# Patient Record
Sex: FEMALE | Race: WHITE | NOT HISPANIC OR LATINO | Employment: OTHER | URBAN - METROPOLITAN AREA
[De-identification: names, ages, dates, MRNs, and addresses within clinical notes are randomized per-mention and may not be internally consistent; named-entity substitution may affect disease eponyms.]

---

## 2017-01-03 ENCOUNTER — TELEPHONE (OUTPATIENT)
Dept: FAMILY MEDICINE | Facility: CLINIC | Age: 82
End: 2017-01-03

## 2017-01-03 DIAGNOSIS — E78.5 HYPERLIPIDEMIA WITH TARGET LDL LESS THAN 100: Primary | ICD-10-CM

## 2017-01-03 NOTE — TELEPHONE ENCOUNTER
FYI-  [facility] pharmacist called requesting information on tried and failed medications for patient. Looking for ways to helpt lower cost of pt's cholesterol medication. Asking if patient has tried Lipitor. Informed pharmacist no lipitor on file but patient has used simvastatin with leg pain SE. Pharmacist will fax information on covered alternatives for provider review. Fax number confirmed.

## 2017-01-04 RX ORDER — ATORVASTATIN CALCIUM 20 MG/1
20 TABLET, FILM COATED ORAL DAILY
Qty: 90 TABLET | Refills: 3 | Status: SHIPPED | OUTPATIENT
Start: 2017-01-04 | End: 2017-05-08

## 2017-01-04 NOTE — TELEPHONE ENCOUNTER
[facility] was called, spoke to Nenita, the will fill atorvastatin for patient, will be 2$.   Pharmacy will contact the patient.   Attempted to contact patient, ring no answer.

## 2017-01-24 DIAGNOSIS — I48.91 ATRIAL FIBRILLATION (H): Primary | ICD-10-CM

## 2017-03-06 ENCOUNTER — TELEPHONE (OUTPATIENT)
Dept: FAMILY MEDICINE | Facility: CLINIC | Age: 82
End: 2017-03-06

## 2017-03-06 NOTE — TELEPHONE ENCOUNTER
Patient called the clinic reporting 7/10 tailbone pain only with sitting, no pain when walking.   She fell years back causing similar pain, but it went away.   Her  recently passed away in September and she was sitting with him a lot.  She also lost 20lbs, gained 10, and trying to gain more.   No numbness or tingling in legs, no weakness, no other lumps or bumps.     NURSING PLAN: Nursing advice to patient given    RECOMMENDED DISPOSITION:  Home care advice - For Back Pain     Avoid activities such as prolonged sitting, lifting, or jumping until the pain is resolved (no not stay in bed).  Take your usual pain medication for discomfort.  Do not give aspirin to a child. Avoid aspirin-like products if age <20 years old. Avoid acetaminophen if liver disease is present. Avoid ibuprofen if kidney disease or stomach problems exist or in the case of pregnancy. Follow the directions on the label.   If pain is related to an injury, apply ice packs for the first 24 hours, then moist heat.   Use moist heat (shower, tub, or moist hot towels) for 20 to 30 minutes every 2 hours for 48 hours, but only while person is awake.    Sleep in a fetal position or on the back with one to two pillows under the knees to help reduce discomfort.  For intermittent or chronic back discomfort, use a heating pad on the affected area 20 to 30 minutes every 2 to 4 hours. Do not sleep on a heating pad. Do not apply heating pad directly to the skin without a cloth barrier between heating pad and skin.     Report the Following Problems to Your PCP/Clinic/ED    No improvement  Pain worsens.    Seek Emergency Care Immediately If Any of the Following Occur     New onset of persistent numbness or tingling in legs of feet, or loss of bowel or bladder control.  Weakness in the limbs.    Will comply with recommendation: Yes  If further questions/concerns or if symptoms do not improve, worsen or new symptoms develop, call your PCP or [facility] as soon as possible.      Guideline used:  Telephone Triage Protocols for Nurses, Fourth Edition, Geeta Baumann RN

## 2017-03-06 NOTE — TELEPHONE ENCOUNTER
Reason for Call:  Other     Detailed comments: problems with tailbone for 2 weeks.  Has questions, call before 10:30 or after 1:00  Phone Number Patient can be reached at: Home number on file [phone] (home)    Best Time: before 10:30 or after 1:00    Can we leave a detailed message on this number? YES    Call taken on 3/6/2017 at 9:06 AM by DAYSI DUMONT

## 2017-05-08 NOTE — PROGRESS NOTES
SUBJECTIVE:                                                            Aileen Raygoza is a 82 year old female who presents for Preventive Visit.    Are you in the first 12 months of your Medicare Part B coverage?  No    Healthy Habits:    Do you get at least three servings of calcium containing foods daily (dairy, green leafy vegetables, etc.)? yes    Amount of exercise or daily activities, outside of work: 1-2 day(s) per week    Problems taking medications regularly No    Medication side effects: No    Have you had an eye exam in the past two years? yes    Do you see a dentist twice per year? yes    Do you have sleep apnea, excessive snoring or daytime drowsiness?no    COGNITIVE SCREEN  1) Repeat 3 items (Banana, Sunrise, Chair)    2) Clock draw: NORMAL  3) 3 item recall: Recalls 3 objects  Results: 3 items recalled: COGNITIVE IMPAIRMENT LESS LIKELY    Mini-CogTM Copyright [employer] [place]. Licensed by the author for use in [facility] [employer]; reprinted with permission (mahesh@[facility]). All rights reserved.        Tailbone pain      Duration: 09/2016    Description (location/character/radiation): patient states that ever since she lost weight has been having tail bone pain when she sits down    Intensity:  mild    Accompanying signs and symptoms: see above    History (similar episodes/previous evaluation): None    Precipitating or alleviating factors: None    Therapies tried and outcome: None    Not significantly painful, does not need to make medication for it.  Only when sitting on hard surfaces.  Denies injury.  Has not had this problem until she lost about 15-20 pounds.       Reviewed and updated as needed this visit by clinical staff  Tobacco  Allergies  Meds  Problems  Med Hx  Surg Hx  Fam Hx  Soc Hx          Reviewed and updated as needed this visit by Provider  Tobacco  Allergies  Meds  Problems  Med Hx  Surg Hx  Fam Hx  Soc Hx         Social History   Substance Use Topics     Smoking status:  Never Smoker     Smokeless tobacco: Never Used     Alcohol use 0.0 oz/week     0 Standard drinks or equivalent per week      Comment: 1 at [employer]       The patient does not drink >3 drinks per day nor >7 drinks per week.    Today's PHQ-2 Score:   PHQ-2 ( 1999 [employer]) 5/9/2017 5/2/2016   Q1: Little interest or pleasure in doing things 0 0   Q2: Feeling down, depressed or hopeless 0 0   PHQ-2 Score 0 0       Do you feel safe in your environment - Yes    Do you have a Health Care Directive?: Yes: Advance Directive has been received and scanned.    Current providers sharing in care for this patient include:   Patient Care Team:  Kristina Beltran PA-C as PCP - General (Physician Assistant)      Hearing impairment: No    Ability to successfully perform activities of daily living: Yes, no assistance needed     Fall risk:  Fallen 2 or more times in the past year?: No  Any fall with injury in the past year?: No    Home safety:  none identified      The following health maintenance items are reviewed in Epic and correct as of today:  Health Maintenance   Topic Date Due     CREATININE Q1 YEAR (NO INBASKET)  05/02/2017     FALL RISK ASSESSMENT  05/02/2017     INFLUENZA VACCINE (SYSTEM ASSIGNED)  09/01/2017     MEDICARE ANNUAL WELLNESS VISIT  05/09/2018     TETANUS IMMUNIZATION (SYSTEM ASSIGNED)  01/01/2020     ADVANCE DIRECTIVE PLANNING Q5 YRS (NO INBASKET)  04/13/2020     DEXA SCAN SCREENING (SYSTEM ASSIGNED)  Completed     PNEUMOCOCCAL  Completed     Mammogram Screening: Patient over age 75, has elected to continue with mammography screening.     ROS:  C: NEGATIVE for fever, chills, change in weight  I: NEGATIVE for worrisome rashes, moles or lesions  E: NEGATIVE for vision changes or irritation  E/M: NEGATIVE for ear, mouth and throat problems  R: NEGATIVE for significant cough or SOB  B: NEGATIVE for masses, tenderness or discharge  CV: NEGATIVE for chest pain, palpitations or peripheral edema  GI: NEGATIVE  "for nausea, abdominal pain, heartburn, or change in bowel habits  : NEGATIVE for frequency, dysuria, or hematuria  MUSCULOSKELETAL:POSITIVE  for coccyx pain when sitting on hard surfaces  N: NEGATIVE for weakness, dizziness or paresthesias  E: NEGATIVE for temperature intolerance, skin/hair changes  H: NEGATIVE for bleeding problems  P: NEGATIVE for changes in mood or affect    Problem list, Medication list, Allergies, and Medical/Social/Surgical histories reviewed in [facility] and updated as appropriate.  BP Readings from Last 3 Encounters:   05/09/17 120/80   10/05/16 124/70   05/02/16 134/78    Wt Readings from Last 3 Encounters:   05/09/17 148 lb (67.1 kg)   10/05/16 150 lb (68 kg)   05/02/16 165 lb 8 oz (75.1 kg)                  Recent Labs   Lab Test  05/02/16   0850  11/19/15   0854  04/13/15   1001  03/04/14   1433   LDL  94  95  155*  131*   HDL  70  72  77  68   TRIG  110  108  80  75   ALT   --   33  32  54*   CR  0.60  0.80  0.70  0.90   GFRESTIMATED  >90  69  81  61   GFRESTBLACK  >90  84  >90  73   POTASSIUM  4.3  4.0  3.6  3.9   TSH   --    --    --   2.29      OBJECTIVE:                                                            /80 (BP Location: Left arm, Patient Position: Chair, Cuff Size: Adult Regular)  Pulse 80  Temp 98.6  F (37  C) (Tympanic)  Resp 14  Ht 5' 3\" (1.6 m)  Wt 148 lb (67.1 kg)  LMP  (LMP Unknown)  SpO2 100%  BMI 26.22 kg/m2 Estimated body mass index is 26.22 kg/(m^2) as calculated from the following:    Height as of this encounter: 5' 3\" (1.6 m).    Weight as of this encounter: 148 lb (67.1 kg).  EXAM:   GENERAL APPEARANCE: healthy, alert and no distress  EYES: Eyes grossly normal to inspection, PERRL and conjunctivae and sclerae normal  HENT: ear canals and TM's normal, nose and mouth without ulcers or lesions, oropharynx clear and oral mucous membranes moist  NECK: no adenopathy, no asymmetry, masses, or scars and thyroid normal to palpation  RESP: lungs clear to " "auscultation - no rales, rhonchi or wheezes  CV: regular rate and rhythm, normal S1 S2, no S3 or S4, no murmur, click or rub, no peripheral edema and peripheral pulses strong  ABDOMEN: soft, nontender, no hepatosplenomegaly, no masses and bowel sounds normal  MS: no musculoskeletal defects are noted and gait is age appropriate without ataxia, No tenderness to palpation of lumbar spine, sacrum or coccyx.  SKIN: no suspicious lesions or rashes  NEURO: Normal strength and tone, sensory exam grossly normal, mentation intact and speech normal  PSYCH: mentation appears normal and affect normal/bright    ASSESSMENT / PLAN:                                                                ICD-10-CM    1. Medicare annual wellness visit, subsequent Z00.00 Lipid with Reflex to Direct LDL     Glucose   2. Hypertension goal BP (blood pressure) < 140/90 I10 lisinopril (PRINIVIL/ZESTRIL) 20 MG tablet   3. Hyperlipidemia with target LDL less than 100 E78.5 atorvastatin (LIPITOR) 20 MG tablet   4. Encounter for therapeutic drug monitoring Z51.81 Creatinine     Potassium   5. Pain in the coccyx M53.3    Discussed pain in coccyx.  No sign of deformity or injury.  Offered xray today but pt declined.  Will monitor and evaluate further if symptoms worsen.      Has mammogram scheduled today, plans to get colonoscopy in 3 years.        End of Life Planning:  Patient currently has an advanced directive: No.  I have verified the patient's ablity to prepare an advanced directive/make health care decisions.  Literature was provided to assist patient in preparing an advanced directive.    COUNSELING:  Reviewed preventive health counseling, as reflected in patient instructions      Estimated body mass index is 26.22 kg/(m^2) as calculated from the following:    Height as of this encounter: 5' 3\" (1.6 m).    Weight as of this encounter: 148 lb (67.1 kg).     reports that she has never smoked. She has never used smokeless tobacco.      Appropriate " preventive services were discussed with this patient, including applicable screening as appropriate for cardiovascular disease, diabetes, osteopenia/osteoporosis, and glaucoma.  As appropriate for age/gender, discussed screening for colorectal cancer, prostate cancer, breast cancer, and cervical cancer. Checklist reviewing preventive services available has been given to the patient.    Reviewed patients plan of care and provided an AVS. The Basic Care Plan (routine screening as documented in Health Maintenance) for Aileen meets the Care Plan requirement. This Care Plan has been established and reviewed with the Patient.    Counseling Resources:  ATP IV Guidelines  Pooled Cohorts Equation Calculator  Breast Cancer Risk Calculator  FRAX Risk Assessment  ICSI Preventive Guidelines  Dietary Guidelines for Americans, 2010  USDA's MyPlate  ASA Prophylaxis  Lung CA Screening    Kristina Beltran PA-C  [facility]

## 2017-05-08 NOTE — PATIENT INSTRUCTIONS
Preventive Health Recommendations    Female Ages 65 +    Yearly exam:     See your health care provider every year in order to  o Review health changes.   o Discuss preventive care.    o Review your medicines if your doctor has prescribed any.      You no longer need a yearly Pap test unless you've had an abnormal Pap test in the past 10 years. If you have vaginal symptoms, such as bleeding or discharge, be sure to talk with your provider about a Pap test.      Every 1 to 2 years, have a mammogram.  If you are over 69, talk with your health care provider about whether or not you want to continue having screening mammograms.      Every 10 years, have a colonoscopy. Or, have a yearly FIT test (stool test). These exams will check for colon cancer.       Have a cholesterol test every 5 years, or more often if your doctor advises it.       Have a diabetes test (fasting glucose) every three years. If you are at risk for diabetes, you should have this test more often.       At age 65, have a bone density scan (DEXA) to check for osteoporosis (brittle bone disease).    Shots:    Get a flu shot each year.    Get a tetanus shot every 10 years.    Talk to your doctor about your pneumonia vaccines. There are now two you should receive - Pneumovax (PPSV 23) and Prevnar (PCV 13).    Talk to your doctor about the shingles vaccine.    Talk to your doctor about the hepatitis B vaccine.    Nutrition:     Eat at least 5 servings of fruits and vegetables each day.      Eat whole-grain bread, whole-wheat pasta and brown rice instead of white grains and rice.      Talk to your provider about Calcium and Vitamin D.     Lifestyle    Exercise at least 150 minutes a week (30 minutes a day, 5 days a week). This will help you control your weight and prevent disease.      Limit alcohol to one drink per day.      No smoking.       Wear sunscreen to prevent skin cancer.       See your dentist twice a year for an exam and cleaning.    See your  eye doctor every 1 to 2 years to screen for conditions such as glaucoma, macular degeneration and cataracts.  Coccyx or Sacrum Contusion    You have a contusion (bruise) of the coccyx or sacrum. The sacrum is the triangular bone at the base of the spine that joins the pelvic bones. The coccyx (tailbone) is the last bone of the sacrum that hangs down in a point like a small tail. Symptoms include swelling and some bleeding under the skin. This injury generaly takes a few weeks to heal. During that time, the bruise will typically change in color from reddish, to purple-blue, to greenish-yellow, then to yellow-brown. A crack (fracture) in the coccyx bone causes the same symptoms as a contusion in this area. Often, x-rays are not taken since the treatment is the same. If you have fracture of the tailbone as well as a contusion, healing generally takes about four weeks or longer.  Home care  Try to find a position of comfort. Try lying on your side with your knees bent up towards your chest and a pillow between your knees.  A bruised tailbone causes pain when sitting. You may try using a donut pillow. This is a foam pillow with a hole in the center to prevent pressure on the tailbone. You can buy this at a pharmacy or orthopedic supply store.  Ice the injured area to help reduce pain and swelling. Wrap a cold source (ice pack or ice cubes in a plastic bag) in a thin towel. Apply to the bruised area for 20 minutes every 1 to 2 hours the first day. Continue this 3 to 4 times a day until the pain and swelling goes away.  Unless another medication was prescribed, you can take acetaminophen, ibuprofen, or naproxen to control pain. (If you have chronic liver or kidney disease or ever had a stomach ulcer or GI bleeding, talk with your doctor before using these medicines.)  Follow up  Follow up with your health care provider or our staff as advised. Call if you are not improving within 2 weeks.  When to seek medical  advice   Call your health care provider right away if you have any of the following:  Increased pain or swelling  Pain that becomes worse or spreads to one or both legs  Weakness or numbness in one or both legs  Loss of bowel or bladder control  Numbness in the groin area  Signs of infection, including warmth, drainage, or increased redness  Frequent bruising for unknown reasons    8053-1938 [employer]. [address] [place] [address]. All rights reserved. This information is not intended as a substitute for professional medical care. Always follow your healthcare professional's instructions.

## 2017-05-09 ENCOUNTER — TRANSFERRED RECORDS (OUTPATIENT)
Dept: HEALTH INFORMATION MANAGEMENT | Facility: CLINIC | Age: 82
End: 2017-05-09

## 2017-05-09 ENCOUNTER — OFFICE VISIT (OUTPATIENT)
Dept: FAMILY MEDICINE | Facility: CLINIC | Age: 82
End: 2017-05-09
Payer: MEDICARE

## 2017-05-09 VITALS
HEART RATE: 80 BPM | OXYGEN SATURATION: 100 % | WEIGHT: 148 LBS | SYSTOLIC BLOOD PRESSURE: 120 MMHG | BODY MASS INDEX: 26.22 KG/M2 | RESPIRATION RATE: 14 BRPM | TEMPERATURE: 98.6 F | HEIGHT: 63 IN | DIASTOLIC BLOOD PRESSURE: 80 MMHG

## 2017-05-09 DIAGNOSIS — I10 HYPERTENSION GOAL BP (BLOOD PRESSURE) < 140/90: ICD-10-CM

## 2017-05-09 DIAGNOSIS — E78.5 HYPERLIPIDEMIA WITH TARGET LDL LESS THAN 100: ICD-10-CM

## 2017-05-09 DIAGNOSIS — Z00.00 MEDICARE ANNUAL WELLNESS VISIT, SUBSEQUENT: Primary | ICD-10-CM

## 2017-05-09 DIAGNOSIS — Z51.81 ENCOUNTER FOR THERAPEUTIC DRUG MONITORING: ICD-10-CM

## 2017-05-09 DIAGNOSIS — M53.3 PAIN IN THE COCCYX: ICD-10-CM

## 2017-05-09 LAB
CHOLEST SERPL-MCNC: 151 MG/DL
CREAT SERPL-MCNC: 0.55 MG/DL (ref 0.52–1.04)
GFR SERPL CREATININE-BSD FRML MDRD: NORMAL ML/MIN/1.7M2
GLUCOSE SERPL-MCNC: 96 MG/DL (ref 70–99)
HDLC SERPL-MCNC: 77 MG/DL
LDLC SERPL CALC-MCNC: 54 MG/DL
NONHDLC SERPL-MCNC: 74 MG/DL
POTASSIUM SERPL-SCNC: 3.8 MMOL/L (ref 3.4–5.3)
TRIGL SERPL-MCNC: 98 MG/DL

## 2017-05-09 PROCEDURE — 82565 ASSAY OF CREATININE: CPT | Performed by: PHYSICIAN ASSISTANT

## 2017-05-09 PROCEDURE — 36415 COLL VENOUS BLD VENIPUNCTURE: CPT | Performed by: PHYSICIAN ASSISTANT

## 2017-05-09 PROCEDURE — 80061 LIPID PANEL: CPT | Performed by: PHYSICIAN ASSISTANT

## 2017-05-09 PROCEDURE — 82947 ASSAY GLUCOSE BLOOD QUANT: CPT | Performed by: PHYSICIAN ASSISTANT

## 2017-05-09 PROCEDURE — 84132 ASSAY OF SERUM POTASSIUM: CPT | Performed by: PHYSICIAN ASSISTANT

## 2017-05-09 PROCEDURE — G0439 PPPS, SUBSEQ VISIT: HCPCS | Performed by: PHYSICIAN ASSISTANT

## 2017-05-09 RX ORDER — ATORVASTATIN CALCIUM 20 MG/1
20 TABLET, FILM COATED ORAL DAILY
Qty: 90 TABLET | Refills: 3 | Status: SHIPPED | OUTPATIENT
Start: 2017-05-09 | End: 2018-01-15

## 2017-05-09 RX ORDER — LISINOPRIL 20 MG/1
20 TABLET ORAL DAILY
Qty: 90 TABLET | Refills: 3 | Status: SHIPPED | OUTPATIENT
Start: 2017-05-09 | End: 2018-01-15

## 2017-05-09 NOTE — LETTER
[facility]  [address]  [place] [address]  [phone]                                                                                                           Aileen Raygoza  [address]  [place] [address]    May 9, 2017      Dear Aileen,    The results of your recent tests were reviewed and are enclosed.     - Your Cholesterol is normal.  - Your kidney function (Cr, GFR) and potassium are normal.  - Your glucose (screening for diabetes) was normal.    Results for orders placed or performed in visit on 05/09/17   Lipid with Reflex to Direct LDL   Result Value Ref Range    Cholesterol 151 <200 mg/dL    Triglycerides 98 <150 mg/dL    HDL Cholesterol 77 >49 mg/dL    LDL Cholesterol Calculated 54 <100 mg/dL    Non HDL Cholesterol 74 <130 mg/dL   Glucose   Result Value Ref Range    Glucose 96 70 - 99 mg/dL   Creatinine   Result Value Ref Range    Creatinine 0.55 0.52 - 1.04 mg/dL    GFR Estimate >90  Non  GFR Calc >60 mL/min/1.7m2   Potassium   Result Value Ref Range    Potassium 3.8 3.4 - 5.3 mmol/L     Thank you for choosing [facility].  We appreciate the opportunity to serve you and look forward to supporting your healthcare needs in the future.    If you have any questions or concerns, please call me or my staff at [phone].    Sincerely,    Kristina Beltran PA-C

## 2017-05-09 NOTE — MR AVS SNAPSHOT
After Visit Summary   5/9/2017    Aileen Raygoza    MRN: [id number]           Patient Information     Date Of Birth          1935        Visit Information        Provider Department      5/9/2017 9:10 AM Kristina Beltran PA-C [facility]        Today's Diagnoses     Medicare annual wellness visit, subsequent    -  1    Hypertension goal BP (blood pressure) < 140/90        Hyperlipidemia with target LDL less than 100        Encounter for therapeutic drug monitoring        Pain in the coccyx          Care Instructions      Preventive Health Recommendations    Female Ages 65 +    Yearly exam:     See your health care provider every year in order to  o Review health changes.   o Discuss preventive care.    o Review your medicines if your doctor has prescribed any.      You no longer need a yearly Pap test unless you've had an abnormal Pap test in the past 10 years. If you have vaginal symptoms, such as bleeding or discharge, be sure to talk with your provider about a Pap test.      Every 1 to 2 years, have a mammogram.  If you are over 69, talk with your health care provider about whether or not you want to continue having screening mammograms.      Every 10 years, have a colonoscopy. Or, have a yearly FIT test (stool test). These exams will check for colon cancer.       Have a cholesterol test every 5 years, or more often if your doctor advises it.       Have a diabetes test (fasting glucose) every three years. If you are at risk for diabetes, you should have this test more often.       At age 65, have a bone density scan (DEXA) to check for osteoporosis (brittle bone disease).    Shots:    Get a flu shot each year.    Get a tetanus shot every 10 years.    Talk to your doctor about your pneumonia vaccines. There are now two you should receive - Pneumovax (PPSV 23) and Prevnar (PCV 13).    Talk to your doctor about the shingles vaccine.    Talk to your  doctor about the hepatitis B vaccine.    Nutrition:     Eat at least 5 servings of fruits and vegetables each day.      Eat whole-grain bread, whole-wheat pasta and brown rice instead of white grains and rice.      Talk to your provider about Calcium and Vitamin D.     Lifestyle    Exercise at least 150 minutes a week (30 minutes a day, 5 days a week). This will help you control your weight and prevent disease.      Limit alcohol to one drink per day.      No smoking.       Wear sunscreen to prevent skin cancer.       See your dentist twice a year for an exam and cleaning.    See your eye doctor every 1 to 2 years to screen for conditions such as glaucoma, macular degeneration and cataracts.  Coccyx or Sacrum Contusion    You have a contusion (bruise) of the coccyx or sacrum. The sacrum is the triangular bone at the base of the spine that joins the pelvic bones. The coccyx (tailbone) is the last bone of the sacrum that hangs down in a point like a small tail. Symptoms include swelling and some bleeding under the skin. This injury generaly takes a few weeks to heal. During that time, the bruise will typically change in color from reddish, to purple-blue, to greenish-yellow, then to yellow-brown. A crack (fracture) in the coccyx bone causes the same symptoms as a contusion in this area. Often, x-rays are not taken since the treatment is the same. If you have fracture of the tailbone as well as a contusion, healing generally takes about four weeks or longer.  Home care  Try to find a position of comfort. Try lying on your side with your knees bent up towards your chest and a pillow between your knees.  A bruised tailbone causes pain when sitting. You may try using a donut pillow. This is a foam pillow with a hole in the center to prevent pressure on the tailbone. You can buy this at a pharmacy or orthopedic supply store.  Ice the injured area to help reduce pain and swelling. Wrap a cold source (ice pack or ice cubes  in a plastic bag) in a thin towel. Apply to the bruised area for 20 minutes every 1 to 2 hours the first day. Continue this 3 to 4 times a day until the pain and swelling goes away.  Unless another medication was prescribed, you can take acetaminophen, ibuprofen, or naproxen to control pain. (If you have chronic liver or kidney disease or ever had a stomach ulcer or GI bleeding, talk with your doctor before using these medicines.)  Follow up  Follow up with your health care provider or our staff as advised. Call if you are not improving within 2 weeks.  When to seek medical advice   Call your health care provider right away if you have any of the following:  Increased pain or swelling  Pain that becomes worse or spreads to one or both legs  Weakness or numbness in one or both legs  Loss of bowel or bladder control  Numbness in the groin area  Signs of infection, including warmth, drainage, or increased redness  Frequent bruising for unknown reasons    7485-8925 [employer]. [address] [place] [address]. All rights reserved. This information is not intended as a substitute for professional medical care. Always follow your healthcare professional's instructions.                Follow-ups after your visit        Who to contact     If you have questions or need follow up information about today's clinic visit or your schedule please contact [facility] directly at [phone].  Normal or non-critical lab and imaging results will be communicated to you by MyChart, letter or phone within 4 business days after the clinic has received the results. If you do not hear from us within 7 days, please contact the clinic through MyChart or phone. If you have a critical or abnormal lab result, we will notify you by phone as soon as possible.  Submit refill requests through [employer] or call your pharmacy and they will forward the refill request to us. Please allow 3 business  "days for your refill to be completed.          Additional Information About Your Visit        Bluetectorhart Information     [employer] lets you send messages to your doctor, view your test results, renew your prescriptions, schedule appointments and more. To sign up, go to [email or web address] . Click on \"Log in\" on the left side of the screen, which will take you to the Welcome page. Then click on \"Sign up Now\" on the right side of the page.     You will be asked to enter the access code listed below, as well as some personal information. Please follow the directions to create your username and password.     Your access code is: [email or web address]  Expires: 2017  9:20 AM     Your access code will  in 90 days. If you need help or a new code, please call your [place] clinic or [phone].        Care EveryWhere ID     This is your Care EveryWhere ID. This could be used by other organizations to access your [place] medical records  [id number]        Your Vitals Were     Pulse Temperature Respirations Height Last Period Pulse Oximetry    80 98.6  F (37  C) (Tympanic) 14 5' 3\" (1.6 m) (LMP Unknown) 100%    BMI (Body Mass Index)                   26.22 kg/m2            Blood Pressure from Last 3 Encounters:   17 120/80   10/05/16 124/70   16 134/78    Weight from Last 3 Encounters:   17 148 lb (67.1 kg)   10/05/16 150 lb (68 kg)   16 165 lb 8 oz (75.1 kg)              We Performed the Following     Creatinine     Glucose     Lipid with Reflex to Direct LDL     Potassium          Where to get your medicines      These medications were sent to [facility] [address] [place] [address] - [address] [facility]  [address] [facility], [facility] [address]     Phone:  [phone]     atorvastatin 20 MG tablet    lisinopril 20 MG tablet          Primary Care Provider Fax #    [place] [facility] [address] [phone]       [address]  [facility] [address]        Thank you!     " Thank you for choosing [facility]  for your care. Our goal is always to provide you with excellent care. Hearing back from our patients is one way we can continue to improve our services. Please take a few minutes to complete the written survey that you may receive in the mail after your visit with us. Thank you!             Your Updated Medication List - Protect others around you: Learn how to safely use, store and throw away your medicines at www.disposemymeds.org.          This list is accurate as of: 5/9/17  9:20 AM.  Always use your most recent med list.                   Brand Name Dispense Instructions for use    atorvastatin 20 MG tablet    LIPITOR    90 tablet    Take 1 tablet (20 mg) by mouth daily       CALCIUM + D PO      Take 1 tablet by mouth daily       IBUPROFEN PO          lisinopril 20 MG tablet    PRINIVIL/ZESTRIL    90 tablet    Take 1 tablet (20 mg) by mouth daily       multivitamin Tabs tablet      Take 1 tablet by mouth 2 times daily       rivaroxaban ANTICOAGULANT 20 MG Tabs tablet    XARELTO    90 tablet    Take 1 tablet (20 mg) by mouth daily (with dinner)       VITAMIN D PO

## 2017-05-09 NOTE — NURSING NOTE
"Chief Complaint   Patient presents with     Physical       Initial /80 (BP Location: Left arm, Patient Position: Chair, Cuff Size: Adult Regular)  Pulse 80  Temp 98.6  F (37  C) (Tympanic)  Resp 14  Ht 5' 3\" (1.6 m)  Wt 148 lb (67.1 kg)  LMP  (LMP Unknown)  SpO2 100%  BMI 26.22 kg/m2 Estimated body mass index is 26.22 kg/(m^2) as calculated from the following:    Height as of this encounter: 5' 3\" (1.6 m).    Weight as of this encounter: 148 lb (67.1 kg).  Medication Reconciliation: complete    "

## 2017-10-18 ENCOUNTER — TELEPHONE (OUTPATIENT)
Dept: CARDIOLOGY | Facility: CLINIC | Age: 82
End: 2017-10-18

## 2017-10-18 ENCOUNTER — OFFICE VISIT (OUTPATIENT)
Dept: CARDIOLOGY | Facility: CLINIC | Age: 82
End: 2017-10-18
Payer: MEDICARE

## 2017-10-18 VITALS
BODY MASS INDEX: 26.22 KG/M2 | SYSTOLIC BLOOD PRESSURE: 120 MMHG | HEIGHT: 63 IN | HEART RATE: 84 BPM | WEIGHT: 148 LBS | DIASTOLIC BLOOD PRESSURE: 60 MMHG

## 2017-10-18 DIAGNOSIS — I48.0 PAROXYSMAL ATRIAL FIBRILLATION (H): Primary | ICD-10-CM

## 2017-10-18 PROCEDURE — 99214 OFFICE O/P EST MOD 30 MIN: CPT | Performed by: INTERNAL MEDICINE

## 2017-10-18 PROCEDURE — 93000 ELECTROCARDIOGRAM COMPLETE: CPT | Performed by: INTERNAL MEDICINE

## 2017-10-18 NOTE — PROGRESS NOTES
"[id number]  Electrophysiology/ Clinic Note         H&P and Plan:         Anastacio Devlin MD    Physical Exam:  Vitals: /60  Pulse 84  Ht 1.6 m (5' 3\")  Wt 67.1 kg (148 lb)  LMP  (LMP Unknown)  BMI 26.22 kg/m2    Constitutional:  AAO x3.  Pt is in NAD.  HEAD: normocephalic.  SKIN: Skin normal color, texture and turgor with no lesions or eruptions.  Eyes: PERRL, EOMI.  ENT:  Supple, normal JVP. No lymphadenopathy or thyroid enlargement.  Chest:  CTAB.  Cardiac:   RRR, normal  S1 and S2.  No murmurs rubs or gallop.    Abdomen:  Normal BS.  Soft, non-tender and non-distended.  No rebound or guarding.    Extremities:  Pedious pulses palpable B/L.  No LE edema noticed.   Neurological: Strength and sensation grossly symmetric and intact throughout.       CURRENT MEDICATIONS:  Current Outpatient Prescriptions   Medication Sig Dispense Refill     lisinopril (PRINIVIL/ZESTRIL) 20 MG tablet Take 1 tablet (20 mg) by mouth daily 90 tablet 3     atorvastatin (LIPITOR) 20 MG tablet Take 1 tablet (20 mg) by mouth daily 90 tablet 3     rivaroxaban ANTICOAGULANT (XARELTO) 20 MG TABS tablet Take 1 tablet (20 mg) by mouth daily (with dinner) 90 tablet 3     IBUPROFEN PO        Cholecalciferol (VITAMIN D PO)        Calcium Carbonate-Vitamin D (CALCIUM + D PO) Take 1 tablet by mouth daily       multivitamin (OCUVITE) TABS Take 1 tablet by mouth 2 times daily         ALLERGIES     Allergies   Allergen Reactions     Simvastatin Muscle Pain (Myalgia)       PAST MEDICAL HISTORY:  Past Medical History:   Diagnosis Date     Hyperlipidemia LDL goal < 100     on Choles Off original     Hypertension goal BP (blood pressure) < 140/90      Other premature beats      Paroxysmal atrial fibrillation (H) 3/4/14     Paroxysmal supraventricular tachycardia (H) 3/4/14     Syncope      Tinnitus of left ear        PAST SURGICAL HISTORY:  Past Surgical History:   Procedure Laterality Date     D & C  1958    miscarriage     TONSILLECTOMY & ADENOIDECTOMY "  1948       FAMILY HISTORY:  Family History   Problem Relation Age of Onset     Cancer - colorectal Mother      HEART DISEASE Father      Alzheimer Disease Brother      Neurologic Disorder Brother      HEART DISEASE Sister      Neurologic Disorder Sister        SOCIAL HISTORY:  Social History     Social History     Marital status:      Spouse name: N/A     Number of children: N/A     Years of education: N/A     Social History Main Topics     Smoking status: Never Smoker     Smokeless tobacco: Never Used     Alcohol use 0.0 oz/week     0 Standard drinks or equivalent per week      Comment: 1 at [place]     Drug use: No     Sexual activity: Yes     Partners: Male     Other Topics Concern     None     Social History Narrative       Review of Systems:  Skin:  Negative     Eyes:  Positive for glasses  ENT:  Negative    Respiratory:  Negative    Cardiovascular:  Negative    Gastroenterology: Negative    Genitourinary:  Negative    Musculoskeletal:  Positive for arthritis  Neurologic:  Negative    Psychiatric:  Positive for excessive stress  Heme/Lymph/Imm:  Negative    Endocrine:  Negative        Recent Lab Results:  LIPID RESULTS:  Lab Results   Component Value Date    CHOL 151 05/09/2017    HDL 77 05/09/2017    LDL 54 05/09/2017    TRIG 98 05/09/2017    CHOLHDLRATIO 3.2 04/13/2015       LIVER ENZYME RESULTS:  Lab Results   Component Value Date    AST 56 (H) 03/04/2014    ALT 33 11/19/2015       CBC RESULTS:  Lab Results   Component Value Date    WBC 5.6 03/04/2014    RBC 4.75 03/04/2014    HGB 14.7 03/04/2014    HCT 44.1 03/04/2014    MCV 93 03/04/2014    MCH 30.9 03/04/2014    MCHC 33.3 03/04/2014    RDW 12.9 03/04/2014     03/04/2014       BMP RESULTS:  Lab Results   Component Value Date     11/19/2015    POTASSIUM 3.8 05/09/2017    CHLORIDE 106 11/19/2015    CO2 28 11/19/2015    ANIONGAP 6.8 11/19/2015    GLC 96 05/09/2017    BUN 15 11/19/2015    CR 0.55 05/09/2017    GFRESTIMATED >90  Non   GFR Calc   05/09/2017    GFRESTBLACK >90   GFR Calc   05/09/2017    NITESH 8.8 11/19/2015        A1C RESULTS:  No results found for: A1C    INR RESULTS:  No results found for: INR      ECHOCARDIOGRAM  No results found for this or any previous visit (from the past 8760 hour(s)).      Orders Placed This Encounter   Procedures     EKG 12-lead complete w/read - Clinics (performed today)     No orders of the defined types were placed in this encounter.    There are no discontinued medications.      Encounter Diagnosis   Name Primary?     Paroxysmal atrial fibrillation (H) Yes         CC  Kristina Beltran PA-C  [address]  [place] [address]

## 2017-10-18 NOTE — MR AVS SNAPSHOT
"              After Visit Summary   10/18/2017    Aileen Raygoza    MRN: [id number]           Patient Information     Date Of Birth          1935        Visit Information        Provider Department      10/18/2017 11:15 AM Anastacio Devlin MD [facility] [place]        Today's Diagnoses     Paroxysmal atrial fibrillation (H)    -  1       Follow-ups after your visit        Who to contact     If you have questions or need follow up information about today's clinic visit or your schedule please contact [facility] directly at [phone].  Normal or non-critical lab and imaging results will be communicated to you by Touch[employer], letter or phone within 4 business days after the clinic has received the results. If you do not hear from us within 7 days, please contact the clinic through [employer] or phone. If you have a critical or abnormal lab result, we will notify you by phone as soon as possible.  Submit refill requests through [employer] or call your pharmacy and they will forward the refill request to us. Please allow 3 business days for your refill to be completed.          Additional Information About Your Visit        MyChart Information     [employer] lets you send messages to your doctor, view your test results, renew your prescriptions, schedule appointments and more. To sign up, go to [email or web address] . Click on \"Log in\" on the left side of the screen, which will take you to the Welcome page. Then click on \"Sign up Now\" on the right side of the page.     You will be asked to enter the access code listed below, as well as some personal information. Please follow the directions to create your username and password.     Your access code is: [email or web address]  Expires: 2018 11:22 AM     Your access code will  in 90 days. If you need help or a new code, please call your [place] clinic or [phone].        Care EveryWhere " "ID     This is your Care EveryWhere ID. This could be used by other organizations to access your [facility] medical records  [id number]        Your Vitals Were     Pulse Height Last Period BMI (Body Mass Index)          84 1.6 m (5' 3\") (LMP Unknown) 26.22 kg/m2         Blood Pressure from Last 3 Encounters:   10/18/17 120/60   05/09/17 120/80   10/05/16 124/70    Weight from Last 3 Encounters:   10/18/17 67.1 kg (148 lb)   05/09/17 67.1 kg (148 lb)   10/05/16 68 kg (150 lb)              We Performed the Following     EKG 12-lead complete w/read - Clinics (performed today)        Primary Care Provider Office Phone # Fax #    Kristina Beltran PA-C [phone] [address] [facility] [address]  [facility] [address]        Equal Access to Services     HELLEN BLOOD : Hadii aad ku hadasho Soomaali, waaxda luqadaha, qaybta kaalmada adeegyada, eric schwab . So [facility] [phone].    ATENCIÓN: Si habla español, tiene a camacho disposición servicios gratuitos de asistencia lingüística. Llame al [phone].    We comply with applicable federal civil rights laws and [place] laws. We do not discriminate on the basis of race, color, national origin, age, disability, sex, sexual orientation, or gender identity.            Thank you!     Thank you for choosing [facility]  for your care. Our goal is always to provide you with excellent care. Hearing back from our patients is one way we can continue to improve our services. Please take a few minutes to complete the written survey that you may receive in the mail after your visit with us. Thank you!             Your Updated Medication List - Protect others around you: Learn how to safely use, store and throw away your medicines at www.disposemymeds.org.          This list is accurate as of: 10/18/17 11:57 AM.  Always use your most recent med list.                   Brand Name Dispense " Instructions for use Diagnosis    atorvastatin 20 MG tablet    LIPITOR    90 tablet    Take 1 tablet (20 mg) by mouth daily    Hyperlipidemia with target LDL less than 100       CALCIUM + D PO      Take 1 tablet by mouth daily        IBUPROFEN PO           lisinopril 20 MG tablet    PRINIVIL/ZESTRIL    90 tablet    Take 1 tablet (20 mg) by mouth daily    Hypertension goal BP (blood pressure) < 140/90       multivitamin Tabs tablet      Take 1 tablet by mouth 2 times daily        rivaroxaban ANTICOAGULANT 20 MG Tabs tablet    XARELTO    90 tablet    Take 1 tablet (20 mg) by mouth daily (with dinner)    Atrial fibrillation (H)       VITAMIN D PO

## 2017-10-18 NOTE — PROGRESS NOTES
REASON FOR VISIT:  Evaluation of paroxysmal AFib.        HISTORY OF PRESENT ILLNESS:  Ms. Abraham is a delightful, 82-year-old lady with a history of hypertension and paroxysmal AFib who is here for evaluation.      The patient was initially diagnosed with AFib in 2014.  At that time she was admitted in the hospital with diarrhea and was found to have asymptomatic paroxysmal AFib.  She was started on Xarelto for thromboembolic prevention.  No AV tirso agents were started, as she was asymptomatic and mildly bradycardic.      At the moment, she is doing well.  She denies any symptoms during this visit.  She denies chest pain, shortness of breath, lightheadedness, near-syncope or syncopal episode.      EKG showed normal sinus rhythm with left axis deviation.  Echocardiogram obtained in 2014 revealed an EF of 60%-65%.  She also had labs in May of this year showing a normal creatinine and good cholesterol levels.      ASSESSMENT AND PLAN:   1.  Paroxysmal AFib.  She continues to be asymptomatic.  Continue conservative approach.   2.  Embolic prevention.  CHADS-VASc score of 4, on Xarelto.  Continue anticoagulation indefinitely.   3.  Followup care.  Follow up in clinic in a year or as needed.         KYLE LINARES MD             D: 10/18/2017 11:21   T: 10/18/2017 12:41   MT: maría      Name:     BRANDIN ABRAHAM   MRN:      [id number]        Account:      [id number]   :      1935           Service Date: 10/18/2017      Document: [id number]

## 2017-10-18 NOTE — TELEPHONE ENCOUNTER
Pt left voicemail asking if she can take a Tylenol PM each night to help her sleep. Called pt back, told her that was just fine, there are no contraindications for her to take Tylenol PM. Pt thanked writer for the information. No further questions.

## 2017-10-18 NOTE — LETTER
10/18/2017    Kristina Beltran PA-C  [address]  [place] [address]    RE: Aileen Mccormickarlette       Dear Colleague,    I had the pleasure of seeing Aileen Raygoza in the [facility].    REASON FOR VISIT:  Evaluation of paroxysmal AFib.        HISTORY OF PRESENT ILLNESS:  Ms. Raygoza is a delightful, 82-year-old lady with a history of hypertension and paroxysmal AFib who is here for evaluation.      The patient was initially diagnosed with AFib in 03/2014.  At that time she was admitted in the hospital with diarrhea and was found to have asymptomatic paroxysmal AFib.  She was started on Xarelto for thromboembolic prevention.  No AV tirso agents were started, as she was asymptomatic and mildly bradycardic.      At the moment, she is doing well.  She denies any symptoms during this visit.  She denies chest pain, shortness of breath, lightheadedness, near-syncope or syncopal episode.      EKG showed normal sinus rhythm with left axis deviation.  Echocardiogram obtained in 03/2014 revealed an EF of 60%-65%.  She also had labs in May of this year showing a normal creatinine and good cholesterol levels.     Outpatient Encounter Prescriptions as of 10/18/2017   Medication Sig Dispense Refill     lisinopril (PRINIVIL/ZESTRIL) 20 MG tablet Take 1 tablet (20 mg) by mouth daily 90 tablet 3     atorvastatin (LIPITOR) 20 MG tablet Take 1 tablet (20 mg) by mouth daily 90 tablet 3     rivaroxaban ANTICOAGULANT (XARELTO) 20 MG TABS tablet Take 1 tablet (20 mg) by mouth daily (with dinner) 90 tablet 3     IBUPROFEN PO        Cholecalciferol (VITAMIN D PO)        Calcium Carbonate-Vitamin D (CALCIUM + D PO) Take 1 tablet by mouth daily       multivitamin (OCUVITE) TABS Take 1 tablet by mouth 2 times daily       No facility-administered encounter medications on file as of 10/18/2017.       ASSESSMENT AND PLAN:   1.  Paroxysmal AFib.  She continues to be asymptomatic.  Continue  conservative approach.   2.  Embolic prevention.  CHADS-VASc score of 4, on Xarelto.  Continue anticoagulation indefinitely.   3.  Followup care.  Follow up in clinic in a year or as needed.     Again, thank you for allowing me to participate in the care of your patient.      Sincerely,    Anastacio Devlin MD     [facility]

## 2018-01-15 ENCOUNTER — OFFICE VISIT (OUTPATIENT)
Dept: FAMILY MEDICINE | Facility: CLINIC | Age: 83
End: 2018-01-15
Payer: MEDICARE

## 2018-01-15 VITALS
DIASTOLIC BLOOD PRESSURE: 72 MMHG | BODY MASS INDEX: 26.31 KG/M2 | TEMPERATURE: 98.3 F | HEART RATE: 84 BPM | RESPIRATION RATE: 16 BRPM | WEIGHT: 148.5 LBS | SYSTOLIC BLOOD PRESSURE: 112 MMHG | OXYGEN SATURATION: 98 % | HEIGHT: 63 IN

## 2018-01-15 DIAGNOSIS — Z01.818 PREOP GENERAL PHYSICAL EXAM: Primary | ICD-10-CM

## 2018-01-15 DIAGNOSIS — I10 HYPERTENSION GOAL BP (BLOOD PRESSURE) < 140/90: ICD-10-CM

## 2018-01-15 DIAGNOSIS — E78.5 HYPERLIPIDEMIA WITH TARGET LDL LESS THAN 100: ICD-10-CM

## 2018-01-15 DIAGNOSIS — I48.0 PAROXYSMAL ATRIAL FIBRILLATION (H): ICD-10-CM

## 2018-01-15 DIAGNOSIS — Z51.81 ENCOUNTER FOR THERAPEUTIC DRUG MONITORING: ICD-10-CM

## 2018-01-15 LAB
ANION GAP SERPL CALCULATED.3IONS-SCNC: 9 MMOL/L (ref 3–14)
BUN SERPL-MCNC: 15 MG/DL (ref 7–30)
CALCIUM SERPL-MCNC: 9.2 MG/DL (ref 8.5–10.1)
CHLORIDE SERPL-SCNC: 103 MMOL/L (ref 94–109)
CHOLEST SERPL-MCNC: 173 MG/DL
CO2 SERPL-SCNC: 26 MMOL/L (ref 20–32)
CREAT SERPL-MCNC: 0.58 MG/DL (ref 0.52–1.04)
GFR SERPL CREATININE-BSD FRML MDRD: >90 ML/MIN/1.7M2
GLUCOSE SERPL-MCNC: 96 MG/DL (ref 70–99)
HDLC SERPL-MCNC: 92 MG/DL
LDLC SERPL CALC-MCNC: 57 MG/DL
NONHDLC SERPL-MCNC: 81 MG/DL
POTASSIUM SERPL-SCNC: 4.1 MMOL/L (ref 3.4–5.3)
SODIUM SERPL-SCNC: 138 MMOL/L (ref 133–144)
TRIGL SERPL-MCNC: 119 MG/DL

## 2018-01-15 PROCEDURE — 80048 BASIC METABOLIC PNL TOTAL CA: CPT | Performed by: PHYSICIAN ASSISTANT

## 2018-01-15 PROCEDURE — 80061 LIPID PANEL: CPT | Performed by: PHYSICIAN ASSISTANT

## 2018-01-15 PROCEDURE — 36415 COLL VENOUS BLD VENIPUNCTURE: CPT | Performed by: PHYSICIAN ASSISTANT

## 2018-01-15 PROCEDURE — 99214 OFFICE O/P EST MOD 30 MIN: CPT | Performed by: PHYSICIAN ASSISTANT

## 2018-01-15 RX ORDER — ATORVASTATIN CALCIUM 20 MG/1
20 TABLET, FILM COATED ORAL DAILY
Qty: 90 TABLET | Refills: 3 | Status: SHIPPED | OUTPATIENT
Start: 2018-01-15 | End: 2019-04-03

## 2018-01-15 RX ORDER — LISINOPRIL 20 MG/1
20 TABLET ORAL DAILY
Qty: 90 TABLET | Refills: 3 | Status: SHIPPED | OUTPATIENT
Start: 2018-01-15 | End: 2019-04-03

## 2018-01-15 NOTE — NURSING NOTE
"Chief Complaint   Patient presents with     Pre-Op Exam     1/25/18       Initial /72 (BP Location: Right arm, Patient Position: Sitting, Cuff Size: Adult Regular)  Pulse 84  Temp 98.3  F (36.8  C) (Tympanic)  Resp 16  Ht 5' 2.75\" (1.594 m)  Wt 148 lb 8 oz (67.4 kg)  LMP  (LMP Unknown)  SpO2 98%  BMI 26.52 kg/m2 Estimated body mass index is 26.52 kg/(m^2) as calculated from the following:    Height as of this encounter: 5' 2.75\" (1.594 m).    Weight as of this encounter: 148 lb 8 oz (67.4 kg).  Medication Reconciliation: complete     Princess TYLER Robles CMA      "

## 2018-01-15 NOTE — MR AVS SNAPSHOT
After Visit Summary   1/15/2018    Aileen Raygoza    MRN: [id number]           Patient Information     Date Of Birth          1935        Visit Information        Provider Department      1/15/2018 10:30 AM Kristina Beltran PA-C [facility]        Today's Diagnoses     Preop general physical exam    -  1    Hyperlipidemia with target LDL less than 100        Encounter for therapeutic drug monitoring        Hypertension goal BP (blood pressure) < 140/90        Paroxysmal atrial fibrillation (H)          Care Instructions      Before Your Surgery      Call your surgeon if there is any change in your health. This includes signs of a cold or flu (such as a sore throat, runny nose, cough, rash or fever).    Do not smoke, drink alcohol or take over the counter medicine (unless your surgeon or primary care doctor tells you to) for the 24 hours before and after surgery.    If you take prescribed drugs: Follow your doctor s orders about which medicines to take and which to stop until after surgery.    Eating and drinking prior to surgery: follow the instructions from your surgeon    Take a shower or bath the night before surgery. Use the soap your surgeon gave you to gently clean your skin. If you do not have soap from your surgeon, use your regular soap. Do not shave or scrub the surgery site.  Wear clean pajamas and have clean sheets on your bed.           Follow-ups after your visit        Who to contact     If you have questions or need follow up information about today's clinic visit or your schedule please contact [facility] directly at [phone].  Normal or non-critical lab and imaging results will be communicated to you by MyChart, letter or phone within 4 business days after the clinic has received the results. If you do not hear from us within 7 days, please contact the clinic through MyChart or phone. If you have a  "critical or abnormal lab result, we will notify you by phone as soon as possible.  Submit refill requests through [employer] or call your pharmacy and they will forward the refill request to us. Please allow 3 business days for your refill to be completed.          Additional Information About Your Visit        [employer] Information     [employer] lets you send messages to your doctor, view your test results, renew your prescriptions, schedule appointments and more. To sign up, go to [email or web address] . Click on \"Log in\" on the left side of the screen, which will take you to the Welcome page. Then click on \"Sign up Now\" on the right side of the page.     You will be asked to enter the access code listed below, as well as some personal information. Please follow the directions to create your username and password.     Your access code is: [email or web address]  Expires: 2018 10:22 AM     Your access code will  in 90 days. If you need help or a new code, please call your [facility] clinic or [phone].        Care EveryWhere ID     This is your Care EveryWhere ID. This could be used by other organizations to access your [facility] medical records  [id number]        Your Vitals Were     Pulse Temperature Respirations Height Last Period Pulse Oximetry    84 98.3  F (36.8  C) (Tympanic) 16 5' 2.75\" (1.594 m) (LMP Unknown) 98%    BMI (Body Mass Index)                   26.52 kg/m2            Blood Pressure from Last 3 Encounters:   01/15/18 112/72   10/18/17 120/60   17 120/80    Weight from Last 3 Encounters:   01/15/18 148 lb 8 oz (67.4 kg)   10/18/17 148 lb (67.1 kg)   17 148 lb (67.1 kg)              We Performed the Following     Basic metabolic panel     Lipid panel reflex to direct LDL Fasting          Today's Medication Changes          These changes are accurate as of: 1/15/18 10:49 AM.  If you have any questions, ask your nurse or doctor.               Stop taking these medicines if you haven't " already. Please contact your care team if you have questions.     IBUPROFEN PO   Stopped by:  Kristina Beltran PA-C                Where to get your medicines      These medications were sent to [facility] [address] [place] [address] - [address] [facility]  [address] [facility], [facility] [address]     Phone:  [phone]     atorvastatin 20 MG tablet    lisinopril 20 MG tablet    rivaroxaban ANTICOAGULANT 20 MG Tabs tablet                Primary Care Provider Office Phone # Fax #    Kristina Beltran PA-C [phone] [address]  [facility] [address]        Equal Access to Services     ARBEN BLOOD : Hadii aad ku hadasho Soomaali, waaxda luqadaha, qaybta kaalmada adeegyada, waxay idiin hayshawnan adeeg melani schwab . So [facility] [phone].    ATENCIÓN: Si habla español, tiene a camacho disposición servicios gratuitos de asistencia lingüística. Llame al [phone].    We comply with applicable federal civil rights laws and [place] laws. We do not discriminate on the basis of race, color, national origin, age, disability, sex, sexual orientation, or gender identity.            Thank you!     Thank you for choosing [facility]  for your care. Our goal is always to provide you with excellent care. Hearing back from our patients is one way we can continue to improve our services. Please take a few minutes to complete the written survey that you may receive in the mail after your visit with us. Thank you!             Your Updated Medication List - Protect others around you: Learn how to safely use, store and throw away your medicines at www.disposemymeds.org.          This list is accurate as of: 1/15/18 10:49 AM.  Always use your most recent med list.                   Brand Name Dispense Instructions for use Diagnosis    atorvastatin 20 MG tablet    LIPITOR    90 tablet    Take 1 tablet (20 mg) by mouth daily    Hyperlipidemia  with target LDL less than 100       CALCIUM + D PO      Take 1 tablet by mouth daily        lisinopril 20 MG tablet    PRINIVIL/ZESTRIL    90 tablet    Take 1 tablet (20 mg) by mouth daily    Hypertension goal BP (blood pressure) < 140/90       multivitamin Tabs tablet      Take 1 tablet by mouth 2 times daily        rivaroxaban ANTICOAGULANT 20 MG Tabs tablet    XARELTO    90 tablet    Take 1 tablet (20 mg) by mouth daily (with dinner)    Paroxysmal atrial fibrillation (H)       VITAMIN D PO

## 2018-01-15 NOTE — LETTER
January 15, 2018      Aileen Raygoza  [address]  [facility] [address]        Dear ,    We are writing to inform you of your test results.    - Your lab results look great; everything is normal.      Resulted Orders   Basic metabolic panel   Result Value Ref Range    Sodium 138 133 - 144 mmol/L    Potassium 4.1 3.4 - 5.3 mmol/L    Chloride 103 94 - 109 mmol/L    Carbon Dioxide 26 20 - 32 mmol/L    Anion Gap 9 3 - 14 mmol/L    Glucose 96 70 - 99 mg/dL      Comment:      Fasting specimen    Urea Nitrogen 15 7 - 30 mg/dL    Creatinine 0.58 0.52 - 1.04 mg/dL    GFR Estimate >90 >60 mL/min/1.7m2      Comment:      Non  GFR Calc    GFR Estimate If Black >90 >60 mL/min/1.7m2      Comment:       GFR Calc    Calcium 9.2 8.5 - 10.1 mg/dL   Lipid panel reflex to direct LDL Fasting   Result Value Ref Range    Cholesterol 173 <200 mg/dL    Triglycerides 119 <150 mg/dL      Comment:      Fasting specimen    HDL Cholesterol 92 >49 mg/dL    LDL Cholesterol Calculated 57 <100 mg/dL      Comment:      Desirable:       <100 mg/dl    Non HDL Cholesterol 81 <130 mg/dL       If you have any questions or concerns, please call the clinic at the number listed above.       Sincerely,        Kristina Beltran PA-C

## 2018-01-15 NOTE — PROGRESS NOTES
Lab letter printed and signed.  Message comments below:  - Your lab results look great; everything is normal.

## 2018-01-15 NOTE — PROGRESS NOTES
[facility]  [address]  [place] [address]  [phone]  Dept: [phone]    PRE-OP EVALUATION:  Today's date: 1/15/2018    Aileen Raygoza (: 1935) presents for pre-operative evaluation assessment as requested by Dr. Declan Shukla.  She requires evaluation and anesthesia risk assessment prior to undergoing surgery/procedure for treatment of cataract .  Proposed procedure: cataract    Date of Surgery/ Procedure: 2018 and 2/15/2018  Time of Surgery/ Procedure: 7:30 AM and 7:00 AM  Hospital/Surgical Facility: [facility]  Fax number for surgical facility: [phone]  Primary Physician: Kristina Beltran  Type of Anesthesia Anticipated: to be determined     Patient has a Health Care Directive or Living Will:  YES     1. NO - Do you have a history of heart attack, stroke, stent, bypass or surgery on an artery in the head, neck, heart or legs?  2. NO - Do you ever have any pain or discomfort in your chest?  3. NO - Do you have a history of  Heart Failure?  4. NO - Are you troubled by shortness of breath when: walking on the level, up a slight hill or at night?  5. NO - Do you currently have a cold, bronchitis or other respiratory infection?  6. NO - Do you have a cough, shortness of breath or wheezing?  7. NO - Do you sometimes get pains in the calves of your legs when you walk?  8. NO - Do you or anyone in your family have previous history of blood clots?  9. NO - Do you or does anyone in your family have a serious bleeding problem such as prolonged bleeding following surgeries or cuts?  10. NO - Have you ever had problems with anemia or been told to take iron pills?  11. NO - Have you had any abnormal blood loss such as black, tarry or bloody stools, or abnormal vaginal bleeding?  12. NO - Have you ever had a blood transfusion?  13. NO - Have you or any of your relatives ever had problems with  anesthesia?  14. NO - Do you have sleep apnea, excessive snoring or daytime drowsiness?  15. NO - Do you have any prosthetic heart valves?  16. NO - Do you have prosthetic joints?  17. NO - Is there any chance that you may be pregnant?    HPI:                                                      Brief HPI related to upcoming procedure: Progressive worsening of vision secondary to bilateral cataracts.    See problem list for active medical problems.  Problems all longstanding and stable, except as noted/documented.  See ROS for pertinent symptoms related to these conditions.                                                                                                MEDICAL HISTORY:                                                      Patient Active Problem List   Diagnosis     Cervicalgia     Dizziness     Tinnitus     Advanced directives, counseling/discussion     Hypertension goal BP (blood pressure) < 140/90     Paroxysmal supraventricular tachycardia (H)     Premature beats     Hyperlipidemia with target LDL less than 100     Nausea     Paroxysmal atrial fibrillation (H)     Pain in the coccyx        Past Medical History:   Diagnosis Date     Hyperlipidemia LDL goal < 100     on Choles Off original     Hypertension goal BP (blood pressure) < 140/90      Other premature beats      Paroxysmal atrial fibrillation (H) 3/4/14     Paroxysmal supraventricular tachycardia (H) 3/4/14     Syncope      Tinnitus of left ear      Past Surgical History:   Procedure Laterality Date     D & C  1958    miscarriage     TONSILLECTOMY & ADENOIDECTOMY  1948     Current Outpatient Prescriptions   Medication Sig Dispense Refill     lisinopril (PRINIVIL/ZESTRIL) 20 MG tablet Take 1 tablet (20 mg) by mouth daily 90 tablet 3     atorvastatin (LIPITOR) 20 MG tablet Take 1 tablet (20 mg) by mouth daily 90 tablet 3     rivaroxaban ANTICOAGULANT (XARELTO) 20 MG TABS tablet Take 1 tablet (20 mg) by mouth daily (with dinner) 90 tablet 3      "Cholecalciferol (VITAMIN D PO)        Calcium Carbonate-Vitamin D (CALCIUM + D PO) Take 1 tablet by mouth daily       multivitamin (OCUVITE) TABS Take 1 tablet by mouth 2 times daily       OTC products: None, except as noted above    Allergies   Allergen Reactions     Simvastatin Muscle Pain (Myalgia)      Latex Allergy: NO    Social History   Substance Use Topics     Smoking status: Never Smoker     Smokeless tobacco: Never Used     Alcohol use 0.0 oz/week     0 Standard drinks or equivalent per week      Comment: 1 at [facility]     History   Drug Use No     REVIEW OF SYSTEMS:                                                    C: NEGATIVE for fever, chills, change in weight  I: NEGATIVE for worrisome rashes, moles or lesions  E: NEGATIVE for vision changes or irritation  E/M: NEGATIVE for ear, mouth and throat problems  R: NEGATIVE for significant cough or SOB  B: NEGATIVE for masses, tenderness or discharge  CV: NEGATIVE for chest pain, palpitations or peripheral edema  GI: NEGATIVE for nausea, abdominal pain, heartburn, or change in bowel habits  : NEGATIVE for frequency, dysuria, or hematuria  M: NEGATIVE for significant arthralgias or myalgia  N: NEGATIVE for weakness, dizziness or paresthesias  E: NEGATIVE for temperature intolerance, skin/hair changes  H: NEGATIVE for bleeding problems  P: NEGATIVE for changes in mood or affect    EXAM:                                                    /72 (BP Location: Right arm, Patient Position: Sitting, Cuff Size: Adult Regular)  Pulse 84  Temp 98.3  F (36.8  C) (Tympanic)  Resp 16  Ht 5' 2.75\" (1.594 m)  Wt 148 lb 8 oz (67.4 kg)  LMP  (LMP Unknown)  SpO2 98%  BMI 26.52 kg/m2    GENERAL APPEARANCE: healthy, alert and no distress     EYES: EOMI, PERRL     HENT: ear canals and TM's normal and nose and mouth without ulcers or lesions     NECK: no adenopathy, no asymmetry, masses, or scars and thyroid normal to palpation     RESP: lungs clear to auscultation " - no rales, rhonchi or wheezes     CV: regular rates and rhythm, normal S1 S2, no S3 or S4 and no murmur, click or rub     ABDOMEN:  soft, nontender, no HSM or masses and bowel sounds normal     MS: extremities normal- no gross deformities noted, no evidence of inflammation in joints, FROM in all extremities.     SKIN: no suspicious lesions or rashes     NEURO: Normal strength and tone, sensory exam grossly normal, mentation intact and speech normal     PSYCH: mentation appears normal. and affect normal/bright    DIAGNOSTICS:                                                    No labs or EKG required for low risk surgery (cataract, skin procedure, breast biopsy, etc)    Recent Labs   Lab Test  05/09/17   0934  05/02/16   0850  11/19/15   0854  04/13/15   1001  03/04/14   1433   HGB   --    --    --    --   14.7   PLT   --    --    --    --   196   NA   --    --   141  141  137   POTASSIUM  3.8  4.3  4.0  3.6  3.9   CR  0.55  0.60  0.80  0.70  0.90        IMPRESSION:                                                    Reason for surgery/procedure: Bilateral cataracts  Diagnosis/reason for consult: Evaluation and anesthesia risk assessment prior to cataract removal      The proposed surgical procedure is considered LOW risk.    REVISED CARDIAC RISK INDEX  The patient has the following serious cardiovascular risks for perioperative complications such as (MI, PE, VFib and 3  AV Block):  No serious cardiac risks  INTERPRETATION: 0 risks: Class I (very low risk - 0.4% complication rate)    The patient has the following additional risks for perioperative complications:  No identified additional risks      ICD-10-CM    1. Preop general physical exam Z01.818    2. Hyperlipidemia with target LDL less than 100 E78.5 Lipid panel reflex to direct LDL Fasting     atorvastatin (LIPITOR) 20 MG tablet   3. Encounter for therapeutic drug monitoring Z51.81 Basic metabolic panel   4. Hypertension goal BP (blood pressure) < 140/90 I10  lisinopril (PRINIVIL/ZESTRIL) 20 MG tablet   5. Paroxysmal atrial fibrillation (H) I48.0 rivaroxaban ANTICOAGULANT (XARELTO) 20 MG TABS tablet       RECOMMENDATIONS:                                                        --Patient is to take all scheduled medications on the day of surgery EXCEPT for modifications listed below.    Anticoagulant or Antiplatelet Medication Use  XARELTO: Bleeding risk is low for this procedure and Dabigatran (Xarelto) may be continued in the perioperative period          APPROVAL GIVEN to proceed with proposed procedure, without further diagnostic evaluation       Signed Electronically by: Kristina Beltran PA-C    Copy of this evaluation report is provided to requesting physician.    [facility] Preop Guidelines

## 2018-01-23 ENCOUNTER — TELEPHONE (OUTPATIENT)
Dept: CARDIOLOGY | Facility: CLINIC | Age: 83
End: 2018-01-23

## 2018-01-23 NOTE — TELEPHONE ENCOUNTER
Patient called in distressed about the cost of Xarelto which is now about 500.00 for 90 days. Last year it was around 300.00 for 90 days. I told her that the cost is higher because of the new year where she has a deductible to meet. Once this is met the cost will come down. I explained to her there are two options: one would be to pay for the Xarelto now and have it applied to her deductible. Or, we can give her some samples now. She states that she is having an eye procedure this Thursday and I stated that this would likely go towards her deduct able. She would like samples for the time being. I will leave her 5 bottles of Xarelto 20 MG which either her daughter or herself will  this Thursday. Mark

## 2018-05-14 ENCOUNTER — TRANSFERRED RECORDS (OUTPATIENT)
Dept: HEALTH INFORMATION MANAGEMENT | Facility: CLINIC | Age: 83
End: 2018-05-14

## 2019-01-03 ENCOUNTER — TELEPHONE (OUTPATIENT)
Dept: CARDIOLOGY | Facility: CLINIC | Age: 84
End: 2019-01-03

## 2019-01-03 DIAGNOSIS — I48.0 PAROXYSMAL ATRIAL FIBRILLATION (H): ICD-10-CM

## 2019-01-04 NOTE — TELEPHONE ENCOUNTER
Pt called back and stated that she wanted her medications to be sent to her home.  Pt [place] [employer] [place] and has not been set up with a mail order.  Pt was given phone number to call to set up and then call this writer back.  Pt called and set up the mail order with [facility].  Pt Script for Xarelto was escripted.  Pt then asked about a f/u with Dr Devlin.  Pt was to f/u in 1 year or as needed.  Pt is doing well and would like to come in the spring. Order placed for March.  Pt recent OV at PCP noted a normal heart rhythm with a good BP and HR.  No further questions at this time. Itzel

## 2019-01-04 NOTE — TELEPHONE ENCOUNTER
Patient left voicemail indicating she had a medication question with no specifics.  Called patient back and left message for patient to call clinic.  Awaiting return call.  GABI Hernandez

## 2019-01-10 ENCOUNTER — TELEPHONE (OUTPATIENT)
Dept: CARDIOLOGY | Facility: CLINIC | Age: 84
End: 2019-01-10

## 2019-01-10 NOTE — TELEPHONE ENCOUNTER
Pt calling and asking if it is OK to take 1/2 of a tylenol PM. Informed her that it is OK to take that medication to help her sleep.

## 2019-03-04 ENCOUNTER — OFFICE VISIT (OUTPATIENT)
Dept: CARDIOLOGY | Facility: CLINIC | Age: 84
End: 2019-03-04
Payer: MEDICARE

## 2019-03-04 VITALS
HEIGHT: 64 IN | WEIGHT: 152 LBS | DIASTOLIC BLOOD PRESSURE: 78 MMHG | BODY MASS INDEX: 25.95 KG/M2 | SYSTOLIC BLOOD PRESSURE: 136 MMHG | HEART RATE: 90 BPM

## 2019-03-04 DIAGNOSIS — I48.0 PAROXYSMAL ATRIAL FIBRILLATION (H): Primary | ICD-10-CM

## 2019-03-04 PROCEDURE — 93000 ELECTROCARDIOGRAM COMPLETE: CPT | Performed by: INTERNAL MEDICINE

## 2019-03-04 PROCEDURE — 99214 OFFICE O/P EST MOD 30 MIN: CPT | Performed by: INTERNAL MEDICINE

## 2019-03-04 ASSESSMENT — MIFFLIN-ST. JEOR: SCORE: 1133.44

## 2019-03-04 NOTE — PROGRESS NOTES
"Electrophysiology/ Clinic Note         H&P and Plan:     REASON FOR VISIT:  Evaluation of paroxysmal AFib.        HISTORY OF PRESENT ILLNESS:  Ms. Raygoza is a delightful, 83-year-old lady with a history of hypertension and paroxysmal AFib who is here for routine evaluation.       The patient was initially diagnosed with AFib in 03/2014.  At that time she was admitted in the hospital with diarrhea and was found to have asymptomatic paroxysmal AFib.  A. fib was an incidental finding and she has been managed conservatively.  She is not taking AV tirso agents and takes Xarelto for 2 embolic prevention.    Today she informs she continues to do well.  She denies any recurrence of A. fib in the last year.  She denies any symptoms such as chest pain, shortness of breath, lightheadedness, near-syncope or syncope.    EKG today showed normal sinus rhythm with first-degree AV block and left axis deviation.  Echocardiogram obtained in 03/2014 revealed an EF of 60%-65%.    BMP obtained last year was within normal limits.     ASSESSMENT AND PLAN:   1.  Paroxysmal AFib.  She continues to be asymptomatic.    We will continue conservative approach.   2.  Embolic prevention.  CHADS-VASc score of 4, on Xarelto.  Continue anticoagulation indefinitely.   3.  Followup care.  Follow up in clinic in a year with REID.       Anastacio Devlin MD    Physical Exam:  Vitals: /78   Pulse 90   Ht 1.632 m (5' 4.25\")   Wt 68.9 kg (152 lb)   LMP  (LMP Unknown)   BMI 25.89 kg/m      Constitutional:  AAO x3.  Pt is in NAD.  HEAD: normocephalic.  SKIN: Skin normal color, texture and turgor with no lesions or eruptions.  Eyes: PERRL, EOMI.  ENT:  Supple, normal JVP. No lymphadenopathy or thyroid enlargement.  Chest:  CTAB.  Cardiac:  RRR, normal  S1 and S2.  No murmurs rubs or gallop.   Abdomen:  Normal BS.  Soft, non-tender and non-distended.  No rebound or guarding.    Extremities:  Pedious pulses palpable B/L.  No LE edema noticed. "   Neurological: Strength and sensation grossly symmetric and intact throughout.       CURRENT MEDICATIONS:  Current Outpatient Medications   Medication Sig Dispense Refill     atorvastatin (LIPITOR) 20 MG tablet Take 1 tablet (20 mg) by mouth daily 90 tablet 3     Calcium Carbonate-Vitamin D (CALCIUM + D PO) Take 1 tablet by mouth daily       Cholecalciferol (VITAMIN D PO)        lisinopril (PRINIVIL/ZESTRIL) 20 MG tablet Take 1 tablet (20 mg) by mouth daily 90 tablet 3     multivitamin (OCUVITE) TABS Take 1 tablet by mouth 2 times daily       rivaroxaban ANTICOAGULANT (XARELTO) 20 MG TABS tablet Take 1 tablet (20 mg) by mouth daily (with dinner) 90 tablet 2       ALLERGIES     Allergies   Allergen Reactions     Simvastatin Muscle Pain (Myalgia)       PAST MEDICAL HISTORY:  Past Medical History:   Diagnosis Date     Hyperlipidemia LDL goal < 100     on Choles Off original     Hypertension goal BP (blood pressure) < 140/90      Other premature beats      Paroxysmal atrial fibrillation (H) 3/4/14     Paroxysmal supraventricular tachycardia (H) 3/4/14     Syncope      Tinnitus of left ear        PAST SURGICAL HISTORY:  Past Surgical History:   Procedure Laterality Date     D & C  1958    miscarriage     TONSILLECTOMY & ADENOIDECTOMY  1948       FAMILY HISTORY:  Family History   Problem Relation Age of Onset     Cancer - colorectal Mother      Heart Disease Father      Alzheimer Disease Brother      Neurologic Disorder Brother      Heart Disease Sister      Neurologic Disorder Sister        SOCIAL HISTORY:  Social History     Socioeconomic History     Marital status:      Spouse name: None     Number of children: None     Years of education: None     Highest education level: None   Occupational History     None   Social Needs     Financial resource strain: None     Food insecurity:     Worry: None     Inability: None     Transportation needs:     Medical: None     Non-medical: None   Tobacco Use     Smoking  status: Never Smoker     Smokeless tobacco: Never Used   Substance and Sexual Activity     Alcohol use: Yes     Alcohol/week: 0.0 oz     Comment: 1 at [place]     Drug use: No     Sexual activity: Yes     Partners: Male   Lifestyle     Physical activity:     Days per week: None     Minutes per session: None     Stress: None   Relationships     Social connections:     Talks on phone: None     Gets together: None     Attends Christian service: None     Active member of club or organization: None     Attends meetings of clubs or organizations: None     Relationship status: None     Intimate partner violence:     Fear of current or ex partner: None     Emotionally abused: None     Physically abused: None     Forced sexual activity: None   Other Topics Concern     Parent/sibling w/ CABG, MI or angioplasty before 65F 55M? Not Asked   Social History Narrative     None       Review of Systems:  Skin:  Negative     Eyes:  Positive for    ENT:  Negative    Respiratory:  Negative    Cardiovascular:  Negative;chest pain;palpitations;exercise intolerance;fatigue;syncope or near-syncope;cyanosis;edema;lightheadedness;dizziness    Gastroenterology: Negative    Genitourinary:  Negative    Musculoskeletal:  Positive for arthritis  Neurologic:  Negative    Psychiatric:  Negative    Heme/Lymph/Imm:  Negative    Endocrine:  Negative        Recent Lab Results:  LIPID RESULTS:  Lab Results   Component Value Date    CHOL 173 01/15/2018    HDL 92 01/15/2018    LDL 57 01/15/2018    TRIG 119 01/15/2018    CHOLHDLRATIO 3.2 04/13/2015       LIVER ENZYME RESULTS:  Lab Results   Component Value Date    AST 56 (H) 03/04/2014    ALT 33 11/19/2015       CBC RESULTS:  Lab Results   Component Value Date    WBC 5.6 03/04/2014    RBC 4.75 03/04/2014    HGB 14.7 03/04/2014    HCT 44.1 03/04/2014    MCV 93 03/04/2014    MCH 30.9 03/04/2014    MCHC 33.3 03/04/2014    RDW 12.9 03/04/2014     03/04/2014       BMP RESULTS:  Lab Results   Component  Value Date     01/15/2018    POTASSIUM 4.1 01/15/2018    CHLORIDE 103 01/15/2018    CO2 26 01/15/2018    ANIONGAP 9 01/15/2018    GLC 96 01/15/2018    BUN 15 01/15/2018    CR 0.58 01/15/2018    GFRESTIMATED >90 01/15/2018    GFRESTBLACK >90 01/15/2018    NITESH 9.2 01/15/2018        A1C RESULTS:  No results found for: A1C    INR RESULTS:  No results found for: INR      ECHOCARDIOGRAM  No results found for this or any previous visit (from the past 8760 hour(s)).      Orders Placed This Encounter   Procedures     EKG 12-lead complete w/read - Clinics (performed today)     No orders of the defined types were placed in this encounter.    There are no discontinued medications.      Encounter Diagnosis   Name Primary?     Paroxysmal atrial fibrillation (H) Yes         CC  Anastacio Devlin MD  [address]  [place] [phone]

## 2019-03-04 NOTE — LETTER
"3/4/2019    Kristina Beltran PA-C  [address]  [facility] [address]    RE: Aileen Mccormickarlette       Dear Colleague,    I had the pleasure of seeing Aileen Raygoza in the [facility].    Electrophysiology/ Clinic Note         H&P and Plan:     REASON FOR VISIT:  Evaluation of paroxysmal AFib.        HISTORY OF PRESENT ILLNESS:  Ms. Raygoza is a delightful, 83-year-old lady with a history of hypertension and paroxysmal AFib who is here for routine evaluation.       The patient was initially diagnosed with AFib in 03/2014.  At that time she was admitted in the hospital with diarrhea and was found to have asymptomatic paroxysmal AFib.  A. fib was an incidental finding and she has been managed conservatively.  She is not taking AV tirso agents and takes Xarelto for 2 embolic prevention.    Today she informs she continues to do well.  She denies any recurrence of A. fib in the last year.  She denies any symptoms such as chest pain, shortness of breath, lightheadedness, near-syncope or syncope.    EKG today showed normal sinus rhythm with first-degree AV block and left axis deviation.  Echocardiogram obtained in 03/2014 revealed an EF of 60%-65%.     BMP obtained last year was within normal limits.     ASSESSMENT AND PLAN:   1.  Paroxysmal AFib.  She continues to be asymptomatic.     We will continue conservative approach.   2.  Embolic prevention.  CHADS-VASc score of 4, on Xarelto.  Continue anticoagulation indefinitely.   3.  Followup care.  Follow up in clinic in a year with REID.       Anastacio Devlin MD    Physical Exam:  Vitals: /78   Pulse 90   Ht 1.632 m (5' 4.25\")   Wt 68.9 kg (152 lb)   LMP  (LMP Unknown)   BMI 25.89 kg/m       Constitutional:  AAO x3.  Pt is in NAD.  HEAD: normocephalic.  SKIN: Skin normal color, texture and turgor with no lesions or eruptions.  Eyes: PERRL, EOMI.  ENT:  Supple, normal JVP. No lymphadenopathy or thyroid " enlargement.  Chest:  CTAB.  Cardiac:  RRR, normal  S1 and S2.  No murmurs rubs or gallop.   Abdomen:  Normal BS.  Soft, non-tender and non-distended.  No rebound or guarding.    Extremities:  Pedious pulses palpable B/L.  No LE edema noticed.   Neurological: Strength and sensation grossly symmetric and intact throughout.       CURRENT MEDICATIONS:  Current Outpatient Medications   Medication Sig Dispense Refill     atorvastatin (LIPITOR) 20 MG tablet Take 1 tablet (20 mg) by mouth daily 90 tablet 3     Calcium Carbonate-Vitamin D (CALCIUM + D PO) Take 1 tablet by mouth daily       Cholecalciferol (VITAMIN D PO)        lisinopril (PRINIVIL/ZESTRIL) 20 MG tablet Take 1 tablet (20 mg) by mouth daily 90 tablet 3     multivitamin (OCUVITE) TABS Take 1 tablet by mouth 2 times daily       rivaroxaban ANTICOAGULANT (XARELTO) 20 MG TABS tablet Take 1 tablet (20 mg) by mouth daily (with dinner) 90 tablet 2       ALLERGIES     Allergies   Allergen Reactions     Simvastatin Muscle Pain (Myalgia)       PAST MEDICAL HISTORY:  Past Medical History:   Diagnosis Date     Hyperlipidemia LDL goal < 100     on Choles Off original     Hypertension goal BP (blood pressure) < 140/90      Other premature beats      Paroxysmal atrial fibrillation (H) 3/4/14     Paroxysmal supraventricular tachycardia (H) 3/4/14     Syncope      Tinnitus of left ear        PAST SURGICAL HISTORY:  Past Surgical History:   Procedure Laterality Date     D & C  1958    miscarriage     TONSILLECTOMY & ADENOIDECTOMY  1948       FAMILY HISTORY:  Family History   Problem Relation Age of Onset     Cancer - colorectal Mother      Heart Disease Father      Alzheimer Disease Brother      Neurologic Disorder Brother      Heart Disease Sister      Neurologic Disorder Sister        SOCIAL HISTORY:  Social History     Socioeconomic History     Marital status:      Spouse name: None     Number of children: None     Years of education: None     Highest education  level: None   Occupational History     None   Social Needs     Financial resource strain: None     Food insecurity:     Worry: None     Inability: None     Transportation needs:     Medical: None     Non-medical: None   Tobacco Use     Smoking status: Never Smoker     Smokeless tobacco: Never Used   Substance and Sexual Activity     Alcohol use: Yes     Alcohol/week: 0.0 oz     Comment: 1 at [place]     Drug use: No     Sexual activity: Yes     Partners: Male   Lifestyle     Physical activity:     Days per week: None     Minutes per session: None     Stress: None   Relationships     Social connections:     Talks on phone: None     Gets together: None     Attends Jain service: None     Active member of club or organization: None     Attends meetings of clubs or organizations: None     Relationship status: None     Intimate partner violence:     Fear of current or ex partner: None     Emotionally abused: None     Physically abused: None     Forced sexual activity: None   Other Topics Concern     Parent/sibling w/ CABG, MI or angioplasty before 65F 55M? Not Asked   Social History Narrative     None       Review of Systems:  Skin:  Negative     Eyes:  Positive for    ENT:  Negative    Respiratory:  Negative    Cardiovascular:  Negative;chest pain;palpitations;exercise intolerance;fatigue;syncope or near-syncope;cyanosis;edema;lightheadedness;dizziness    Gastroenterology: Negative    Genitourinary:  Negative    Musculoskeletal:  Positive for arthritis  Neurologic:  Negative    Psychiatric:  Negative    Heme/Lymph/Imm:  Negative    Endocrine:  Negative        Recent Lab Results:  LIPID RESULTS:  Lab Results   Component Value Date    CHOL 173 01/15/2018    HDL 92 01/15/2018    LDL 57 01/15/2018    TRIG 119 01/15/2018    CHOLHDLRATIO 3.2 04/13/2015       LIVER ENZYME RESULTS:  Lab Results   Component Value Date    AST 56 (H) 03/04/2014    ALT 33 11/19/2015       CBC RESULTS:  Lab Results   Component Value Date     WBC 5.6 03/04/2014    RBC 4.75 03/04/2014    HGB 14.7 03/04/2014    HCT 44.1 03/04/2014    MCV 93 03/04/2014    MCH 30.9 03/04/2014    MCHC 33.3 03/04/2014    RDW 12.9 03/04/2014     03/04/2014       BMP RESULTS:  Lab Results   Component Value Date     01/15/2018    POTASSIUM 4.1 01/15/2018    CHLORIDE 103 01/15/2018    CO2 26 01/15/2018    ANIONGAP 9 01/15/2018    GLC 96 01/15/2018    BUN 15 01/15/2018    CR 0.58 01/15/2018    GFRESTIMATED >90 01/15/2018    GFRESTBLACK >90 01/15/2018    NITESH 9.2 01/15/2018        A1C RESULTS:  No results found for: A1C    INR RESULTS:  No results found for: INR      ECHOCARDIOGRAM  No results found for this or any previous visit (from the past 8760 hour(s)).      Orders Placed This Encounter   Procedures     EKG 12-lead complete w/read - Clinics (performed today)     No orders of the defined types were placed in this encounter.    There are no discontinued medications.      Encounter Diagnosis   Name Primary?     Paroxysmal atrial fibrillation (H) Yes         Thank you for allowing me to participate in the care of your patient.    Sincerely,     Anastacio Devlin MD     [facility]

## 2019-03-04 NOTE — LETTER
"3/4/2019    Kristina Beltran PA-C  [address]  [facility] [address]    RE: Aileen Mccormickarlette       Dear Colleague,    I had the pleasure of seeing Aileen Raygoza in the [facility].    Electrophysiology/ Clinic Note         H&P and Plan:     REASON FOR VISIT:  Evaluation of paroxysmal AFib.        HISTORY OF PRESENT ILLNESS:  Ms. Raygoza is a delightful, 83-year-old lady with a history of hypertension and paroxysmal AFib who is here for routine evaluation.       The patient was initially diagnosed with AFib in 03/2014.  At that time she was admitted in the hospital with diarrhea and was found to have asymptomatic paroxysmal AFib.  A. fib was an incidental finding and she has been managed conservatively.  She is not taking AV tirso agents and takes Xarelto for 2 embolic prevention.    Today she informs she continues to do well.  She denies any recurrence of A. fib in the last year.  She denies any symptoms such as chest pain, shortness of breath, lightheadedness, near-syncope or syncope.    EKG today showed normal sinus rhythm with first-degree AV block and left axis deviation.  Echocardiogram obtained in 03/2014 revealed an EF of 60%-65%.     BMP obtained last year was within normal limits.     ASSESSMENT AND PLAN:   1.  Paroxysmal AFib.  She continues to be asymptomatic.     We will continue conservative approach.   2.  Embolic prevention.  CHADS-VASc score of 4, on Xarelto.  Continue anticoagulation indefinitely.   3.  Followup care.  Follow up in clinic in a year with REID.       Anastacio Devlin MD    Physical Exam:  Vitals: /78   Pulse 90   Ht 1.632 m (5' 4.25\")   Wt 68.9 kg (152 lb)   LMP  (LMP Unknown)   BMI 25.89 kg/m       Constitutional:  AAO x3.  Pt is in NAD.  HEAD: normocephalic.  SKIN: Skin normal color, texture and turgor with no lesions or eruptions.  Eyes: PERRL, EOMI.  ENT:  Supple, normal JVP. No lymphadenopathy or thyroid " enlargement.  Chest:  CTAB.  Cardiac:  RRR, normal  S1 and S2.  No murmurs rubs or gallop.   Abdomen:  Normal BS.  Soft, non-tender and non-distended.  No rebound or guarding.    Extremities:  Pedious pulses palpable B/L.  No LE edema noticed.   Neurological: Strength and sensation grossly symmetric and intact throughout.       CURRENT MEDICATIONS:  Current Outpatient Medications   Medication Sig Dispense Refill     atorvastatin (LIPITOR) 20 MG tablet Take 1 tablet (20 mg) by mouth daily 90 tablet 3     Calcium Carbonate-Vitamin D (CALCIUM + D PO) Take 1 tablet by mouth daily       Cholecalciferol (VITAMIN D PO)        lisinopril (PRINIVIL/ZESTRIL) 20 MG tablet Take 1 tablet (20 mg) by mouth daily 90 tablet 3     multivitamin (OCUVITE) TABS Take 1 tablet by mouth 2 times daily       rivaroxaban ANTICOAGULANT (XARELTO) 20 MG TABS tablet Take 1 tablet (20 mg) by mouth daily (with dinner) 90 tablet 2       ALLERGIES     Allergies   Allergen Reactions     Simvastatin Muscle Pain (Myalgia)       PAST MEDICAL HISTORY:  Past Medical History:   Diagnosis Date     Hyperlipidemia LDL goal < 100     on Choles Off original     Hypertension goal BP (blood pressure) < 140/90      Other premature beats      Paroxysmal atrial fibrillation (H) 3/4/14     Paroxysmal supraventricular tachycardia (H) 3/4/14     Syncope      Tinnitus of left ear        PAST SURGICAL HISTORY:  Past Surgical History:   Procedure Laterality Date     D & C  1958    miscarriage     TONSILLECTOMY & ADENOIDECTOMY  1948       FAMILY HISTORY:  Family History   Problem Relation Age of Onset     Cancer - colorectal Mother      Heart Disease Father      Alzheimer Disease Brother      Neurologic Disorder Brother      Heart Disease Sister      Neurologic Disorder Sister        SOCIAL HISTORY:  Social History     Socioeconomic History     Marital status:      Spouse name: None     Number of children: None     Years of education: None     Highest education  level: None   Occupational History     None   Social Needs     Financial resource strain: None     Food insecurity:     Worry: None     Inability: None     Transportation needs:     Medical: None     Non-medical: None   Tobacco Use     Smoking status: Never Smoker     Smokeless tobacco: Never Used   Substance and Sexual Activity     Alcohol use: Yes     Alcohol/week: 0.0 oz     Comment: 1 at [place]     Drug use: No     Sexual activity: Yes     Partners: Male   Lifestyle     Physical activity:     Days per week: None     Minutes per session: None     Stress: None   Relationships     Social connections:     Talks on phone: None     Gets together: None     Attends Denominational service: None     Active member of club or organization: None     Attends meetings of clubs or organizations: None     Relationship status: None     Intimate partner violence:     Fear of current or ex partner: None     Emotionally abused: None     Physically abused: None     Forced sexual activity: None   Other Topics Concern     Parent/sibling w/ CABG, MI or angioplasty before 65F 55M? Not Asked   Social History Narrative     None       Review of Systems:  Skin:  Negative     Eyes:  Positive for    ENT:  Negative    Respiratory:  Negative    Cardiovascular:  Negative;chest pain;palpitations;exercise intolerance;fatigue;syncope or near-syncope;cyanosis;edema;lightheadedness;dizziness    Gastroenterology: Negative    Genitourinary:  Negative    Musculoskeletal:  Positive for arthritis  Neurologic:  Negative    Psychiatric:  Negative    Heme/Lymph/Imm:  Negative    Endocrine:  Negative        Recent Lab Results:  LIPID RESULTS:  Lab Results   Component Value Date    CHOL 173 01/15/2018    HDL 92 01/15/2018    LDL 57 01/15/2018    TRIG 119 01/15/2018    CHOLHDLRATIO 3.2 04/13/2015       LIVER ENZYME RESULTS:  Lab Results   Component Value Date    AST 56 (H) 03/04/2014    ALT 33 11/19/2015       CBC RESULTS:  Lab Results   Component Value Date     WBC 5.6 03/04/2014    RBC 4.75 03/04/2014    HGB 14.7 03/04/2014    HCT 44.1 03/04/2014    MCV 93 03/04/2014    MCH 30.9 03/04/2014    MCHC 33.3 03/04/2014    RDW 12.9 03/04/2014     03/04/2014       BMP RESULTS:  Lab Results   Component Value Date     01/15/2018    POTASSIUM 4.1 01/15/2018    CHLORIDE 103 01/15/2018    CO2 26 01/15/2018    ANIONGAP 9 01/15/2018    GLC 96 01/15/2018    BUN 15 01/15/2018    CR 0.58 01/15/2018    GFRESTIMATED >90 01/15/2018    GFRESTBLACK >90 01/15/2018    NITESH 9.2 01/15/2018        A1C RESULTS:  No results found for: A1C    INR RESULTS:  No results found for: INR      ECHOCARDIOGRAM  No results found for this or any previous visit (from the past 8760 hour(s)).      Orders Placed This Encounter   Procedures     EKG 12-lead complete w/read - Clinics (performed today)     No orders of the defined types were placed in this encounter.    There are no discontinued medications.      Encounter Diagnosis   Name Primary?     Paroxysmal atrial fibrillation (H) Yes         CC  Anastacio Devlin MD  [address]  [place] [phone]                Thank you for allowing me to participate in the care of your patient.      Sincerely,     Anastacio Devlin MD     [facility]    cc:   Anastacio Devlin MD  [address]  [place] [phone]

## 2019-04-03 ENCOUNTER — OFFICE VISIT (OUTPATIENT)
Dept: FAMILY MEDICINE | Facility: CLINIC | Age: 84
End: 2019-04-03
Payer: MEDICARE

## 2019-04-03 VITALS
DIASTOLIC BLOOD PRESSURE: 72 MMHG | SYSTOLIC BLOOD PRESSURE: 134 MMHG | WEIGHT: 150 LBS | RESPIRATION RATE: 16 BRPM | BODY MASS INDEX: 26.58 KG/M2 | TEMPERATURE: 98.6 F | OXYGEN SATURATION: 99 % | HEIGHT: 63 IN | HEART RATE: 80 BPM

## 2019-04-03 DIAGNOSIS — I10 HYPERTENSION GOAL BP (BLOOD PRESSURE) < 140/90: ICD-10-CM

## 2019-04-03 DIAGNOSIS — Z00.00 MEDICARE ANNUAL WELLNESS VISIT, SUBSEQUENT: Primary | ICD-10-CM

## 2019-04-03 DIAGNOSIS — E78.5 HYPERLIPIDEMIA WITH TARGET LDL LESS THAN 100: ICD-10-CM

## 2019-04-03 LAB
ERYTHROCYTE [DISTWIDTH] IN BLOOD BY AUTOMATED COUNT: 13 % (ref 10–15)
HCT VFR BLD AUTO: 43.8 % (ref 35–47)
HGB BLD-MCNC: 14.4 G/DL (ref 11.7–15.7)
MCH RBC QN AUTO: 31 PG (ref 26.5–33)
MCHC RBC AUTO-ENTMCNC: 32.9 G/DL (ref 31.5–36.5)
MCV RBC AUTO: 94 FL (ref 78–100)
PLATELET # BLD AUTO: 201 10E9/L (ref 150–450)
RBC # BLD AUTO: 4.65 10E12/L (ref 3.8–5.2)
WBC # BLD AUTO: 4.4 10E9/L (ref 4–11)

## 2019-04-03 PROCEDURE — 80061 LIPID PANEL: CPT | Performed by: PHYSICIAN ASSISTANT

## 2019-04-03 PROCEDURE — 36415 COLL VENOUS BLD VENIPUNCTURE: CPT | Performed by: PHYSICIAN ASSISTANT

## 2019-04-03 PROCEDURE — G0439 PPPS, SUBSEQ VISIT: HCPCS | Performed by: PHYSICIAN ASSISTANT

## 2019-04-03 PROCEDURE — 80048 BASIC METABOLIC PNL TOTAL CA: CPT | Performed by: PHYSICIAN ASSISTANT

## 2019-04-03 PROCEDURE — 85027 COMPLETE CBC AUTOMATED: CPT | Performed by: PHYSICIAN ASSISTANT

## 2019-04-03 RX ORDER — LISINOPRIL 20 MG/1
20 TABLET ORAL DAILY
Qty: 90 TABLET | Refills: 3 | Status: SHIPPED | OUTPATIENT
Start: 2019-04-03 | End: 2020-03-25

## 2019-04-03 RX ORDER — ATORVASTATIN CALCIUM 20 MG/1
20 TABLET, FILM COATED ORAL DAILY
Qty: 90 TABLET | Refills: 3 | Status: SHIPPED | OUTPATIENT
Start: 2019-04-03 | End: 2020-03-25

## 2019-04-03 ASSESSMENT — MIFFLIN-ST. JEOR: SCORE: 1104.53

## 2019-04-03 NOTE — PROGRESS NOTES
"  SUBJECTIVE:   Aileen Raygoza is a 83 year old female who presents for Preventive Visit.  click delete button to remove this line now  click delete button to remove this line now  Are you in the first 12 months of your Medicare Part B coverage?  No    Physical Health:    In general, how would you rate your overall physical health? good    Outside of work, how many days during the week do you exercise? 2-3 days/week    Outside of work, approximately how many minutes a day do you exercise?45-60 minutes    If you drink alcohol do you typically have >3 drinks per day or >7 drinks per week? No    Do you usually eat at least 4 servings of fruit and vegetables a day, include whole grains & fiber and avoid regularly eating high fat or \"junk\" foods? Yes    Do you have any problems taking medications regularly?  No    Do you have any side effects from medications? none    Needs assistance for the following daily activities: no assistance needed    Which of the following safety concerns are present in your home?  none identified     Hearing impairment: No    In the past 6 months, have you been bothered by leaking of urine? yes    Mental Health:    In general, how would you rate your overall mental or emotional health? excellent  PHQ-2 Score:      Do you feel safe in your environment? Yes    Do you have a Health Care Directive? Yes: Patient states has Advance Directive and will bring in a copy to clinic.    Additional concerns to address?  No    Fall risk:  Fallen 2 or more times in the past year?: No  Any fall with injury in the past year?: No  click delete button to remove this line now  Cognitive Screenin) Repeat 3 items (Leader, Season, Table)    2) Clock draw: ABNORMAL   3) 3 item recall: Recalls 3 objects  Results: 3 items recalled: COGNITIVE IMPAIRMENT LESS LIKELY    Mini-CogTM Copyright MANUEL Wallace. Licensed by the author for use in [facility]; reprinted with permission (mahesh@.[facility]). All rights " reserved.      Do you have sleep apnea, excessive snoring or daytime drowsiness?: no          Reviewed and updated as needed this visit by clinical staff  Tobacco  Allergies  Meds  Problems  Med Hx  Surg Hx  Fam Hx  Soc Hx          Reviewed and updated as needed this visit by Provider  Tobacco  Allergies  Meds  Problems  Med Hx  Surg Hx  Fam Hx  Soc Hx         Social History     Tobacco Use     Smoking status: Never Smoker     Smokeless tobacco: Never Used   Substance Use Topics     Alcohol use: Yes     Alcohol/week: 0.0 oz     Comment: 1 at [place]                           Current providers sharing in care for this patient include:   Patient Care Team:  Kristina Beltran PA-C as PCP - General (Physician Assistant)  Kristina Beltran PA-C as Assigned PCP    The following health maintenance items are reviewed in Epic and correct as of today:  Health Maintenance   Topic Date Due     ZOSTER IMMUNIZATION (2 of 3) 10/02/2009     MEDICARE ANNUAL WELLNESS VISIT  05/09/2018     FALL RISK ASSESSMENT  05/09/2018     PHQ-2 Q1 YR  05/09/2018     CREATININE Q1 YEAR  01/15/2019     DTAP/TDAP/TD IMMUNIZATION (2 - Td) 01/01/2020     ADVANCE DIRECTIVE PLANNING Q5 YRS  04/13/2020     DEXA SCAN SCREENING (SYSTEM ASSIGNED)  Completed     INFLUENZA VACCINE  Completed     PNEUMOCOCCAL IMMUNIZATION 65+ LOW/MEDIUM RISK  Completed     IPV IMMUNIZATION  Aged Out     MENINGITIS IMMUNIZATION  Aged Out     BP Readings from Last 3 Encounters:   04/03/19 134/72   03/04/19 136/78   01/15/18 112/72    Wt Readings from Last 3 Encounters:   04/03/19 68 kg (150 lb)   03/04/19 68.9 kg (152 lb)   01/15/18 67.4 kg (148 lb 8 oz)            Recent Labs   Lab Test 01/15/18  1052 05/09/17  0934 05/02/16  0850 11/19/15  0854 04/13/15  1001 03/04/14  1433   LDL 57 54 94 95 155* 131*   HDL 92 77 70 72 77 68   TRIG 119 98 110 108 80 75   ALT  --   --   --  33 32 54*   CR 0.58 0.55 0.60 0.80 0.70 0.90   GFRESTIMATED >90  ">90  Non  GFR Calc   >90 69 81 61   GFRESTBLACK >90 >90   GFR Calc   >90 84 >90 73   POTASSIUM 4.1 3.8 4.3 4.0 3.6 3.9   TSH  --   --   --   --   --  2.29      Mammogram Screening: Patient over age 75, has elected to continue with mammography screening.  Due in May.    ROS:  CONSTITUTIONAL: NEGATIVE for fever, chills, change in weight  INTEGUMENTARY/SKIN: NEGATIVE for worrisome rashes, moles or lesions  EYES: NEGATIVE for vision changes or irritation  ENT/MOUTH: NEGATIVE for ear, mouth and throat problems  RESP: NEGATIVE for significant cough or SOB  BREAST: NEGATIVE for masses, tenderness or discharge  CV: NEGATIVE for chest pain, palpitations or peripheral edema  GI: NEGATIVE for nausea, abdominal pain, heartburn, or change in bowel habits  : NEGATIVE for frequency, dysuria, or hematuria  MUSCULOSKELETAL: NEGATIVE for significant arthralgias or myalgia  NEURO: NEGATIVE for weakness, dizziness or paresthesias  ENDOCRINE: NEGATIVE for temperature intolerance, skin/hair changes  HEME: NEGATIVE for bleeding problems  PSYCHIATRIC: NEGATIVE for changes in mood or affect    OBJECTIVE:   /72   Pulse 80   Temp 98.6  F (37  C) (Tympanic)   Resp 16   Ht 1.6 m (5' 3\")   Wt 68 kg (150 lb)   LMP  (LMP Unknown)   SpO2 99%   BMI 26.57 kg/m   Estimated body mass index is 26.57 kg/m  as calculated from the following:    Height as of this encounter: 1.6 m (5' 3\").    Weight as of this encounter: 68 kg (150 lb).  EXAM:   GENERAL APPEARANCE: healthy, alert and no distress  EYES: Eyes grossly normal to inspection, PERRL and conjunctivae and sclerae normal  HENT: ear canals and TM's normal, nose and mouth without ulcers or lesions, oropharynx clear and oral mucous membranes moist  NECK: no adenopathy, no asymmetry, masses, or scars and thyroid normal to palpation  RESP: lungs clear to auscultation - no rales, rhonchi or wheezes  CV: regular rate and rhythm, normal S1 S2, no S3 or S4, no " "murmur, click or rub, no peripheral edema and peripheral pulses strong  ABDOMEN: soft, nontender, no hepatosplenomegaly, no masses and bowel sounds normal  MS: no musculoskeletal defects are noted and gait is age appropriate without ataxia  SKIN: no suspicious lesions or rashes  NEURO: Normal strength and tone, sensory exam grossly normal, mentation intact and speech normal  PSYCH: mentation appears normal and affect normal/bright    ASSESSMENT / PLAN:       ICD-10-CM    1. Medicare annual wellness visit, subsequent Z00.00 Lipid panel reflex to direct LDL Fasting     Basic metabolic panel     CBC with platelets   2. Hypertension goal BP (blood pressure) < 140/90 I10 lisinopril (PRINIVIL/ZESTRIL) 20 MG tablet   3. Hyperlipidemia with target LDL less than 100 E78.5 atorvastatin (LIPITOR) 20 MG tablet       End of Life Planning:  Patient currently has an advanced directive: Yes.  Practitioner is supportive of decision.    COUNSELING:  Reviewed preventive health counseling, as reflected in patient instructions    BP Readings from Last 1 Encounters:   04/03/19 134/72     Estimated body mass index is 26.57 kg/m  as calculated from the following:    Height as of this encounter: 1.6 m (5' 3\").    Weight as of this encounter: 68 kg (150 lb).           reports that  has never smoked. she has never used smokeless tobacco.      Appropriate preventive services were discussed with this patient, including applicable screening as appropriate for cardiovascular disease, diabetes, osteopenia/osteoporosis, and glaucoma.  As appropriate for age/gender, discussed screening for colorectal cancer, prostate cancer, breast cancer, and cervical cancer. Checklist reviewing preventive services available has been given to the patient.    Reviewed patients plan of care and provided an AVS. The Basic Care Plan (routine screening as documented in Health Maintenance) for Aileen meets the Care Plan requirement. This Care Plan has been established " and reviewed with the Patient.    Counseling Resources:  ATP IV Guidelines  Pooled Cohorts Equation Calculator  Breast Cancer Risk Calculator  FRAX Risk Assessment  ICSI Preventive Guidelines  Dietary Guidelines for Americans, 2010  USDA's MyPlate  ASA Prophylaxis  Lung CA Screening    Kristina Beltran PA-C  [facility]

## 2019-04-03 NOTE — LETTER
April 4, 2019      Aileen Raygoza  [address]  [facility] [address]        Dear ,    We are writing to inform you of your test results.    Your test results fall within the expected range(s) or remain unchanged from previous results.  Please continue with current treatment plan.    Resulted Orders   Lipid panel reflex to direct LDL Fasting   Result Value Ref Range    Cholesterol 164 <200 mg/dL    Triglycerides 115 <150 mg/dL      Comment:      Fasting specimen    HDL Cholesterol 65 >49 mg/dL    LDL Cholesterol Calculated 76 <100 mg/dL      Comment:      Desirable:       <100 mg/dl    Non HDL Cholesterol 99 <130 mg/dL   Basic metabolic panel   Result Value Ref Range    Sodium 139 133 - 144 mmol/L    Potassium 4.0 3.4 - 5.3 mmol/L    Chloride 106 94 - 109 mmol/L    Carbon Dioxide 27 20 - 32 mmol/L    Anion Gap 6 3 - 14 mmol/L    Glucose 101 (H) 70 - 99 mg/dL      Comment:      Fasting specimen    Urea Nitrogen 12 7 - 30 mg/dL    Creatinine 0.59 0.52 - 1.04 mg/dL    GFR Estimate 84 >60 mL/min/[1.73_m2]    Calcium 8.8 8.5 - 10.1 mg/dL   CBC with platelets   Result Value Ref Range    WBC 4.4 4.0 - 11.0 10e9/L    RBC Count 4.65 3.8 - 5.2 10e12/L    Hemoglobin 14.4 11.7 - 15.7 g/dL    Hematocrit 43.8 35.0 - 47.0 %    MCV 94 78 - 100 fl    MCH 31.0 26.5 - 33.0 pg    MCHC 32.9 31.5 - 36.5 g/dL    RDW 13.0 10.0 - 15.0 %    Platelet Count 201 150 - 450 10e9/L       If you have any questions or concerns, please call the clinic at the number listed above.       Sincerely,        Kristina Beltran PA-C

## 2019-04-03 NOTE — PATIENT INSTRUCTIONS

## 2019-04-04 LAB
ANION GAP SERPL CALCULATED.3IONS-SCNC: 6 MMOL/L (ref 3–14)
BUN SERPL-MCNC: 12 MG/DL (ref 7–30)
CALCIUM SERPL-MCNC: 8.8 MG/DL (ref 8.5–10.1)
CHLORIDE SERPL-SCNC: 106 MMOL/L (ref 94–109)
CHOLEST SERPL-MCNC: 164 MG/DL
CO2 SERPL-SCNC: 27 MMOL/L (ref 20–32)
CREAT SERPL-MCNC: 0.59 MG/DL (ref 0.52–1.04)
GFR SERPL CREATININE-BSD FRML MDRD: 84 ML/MIN/{1.73_M2}
GLUCOSE SERPL-MCNC: 101 MG/DL (ref 70–99)
HDLC SERPL-MCNC: 65 MG/DL
LDLC SERPL CALC-MCNC: 76 MG/DL
NONHDLC SERPL-MCNC: 99 MG/DL
POTASSIUM SERPL-SCNC: 4 MMOL/L (ref 3.4–5.3)
SODIUM SERPL-SCNC: 139 MMOL/L (ref 133–144)
TRIGL SERPL-MCNC: 115 MG/DL

## 2019-04-04 NOTE — RESULT ENCOUNTER NOTE
Lab letter printed and signed.  Message comments below:    Your test results fall within the expected range(s) or remain unchanged from previous results.  Please continue with current treatment plan.

## 2019-04-22 ENCOUNTER — OFFICE VISIT (OUTPATIENT)
Dept: FAMILY MEDICINE | Facility: CLINIC | Age: 84
End: 2019-04-22
Payer: MEDICARE

## 2019-04-22 ENCOUNTER — ANCILLARY PROCEDURE (OUTPATIENT)
Dept: GENERAL RADIOLOGY | Facility: CLINIC | Age: 84
End: 2019-04-22
Attending: FAMILY MEDICINE
Payer: MEDICARE

## 2019-04-22 VITALS
OXYGEN SATURATION: 98 % | WEIGHT: 155 LBS | BODY MASS INDEX: 27.46 KG/M2 | RESPIRATION RATE: 16 BRPM | SYSTOLIC BLOOD PRESSURE: 130 MMHG | TEMPERATURE: 98.2 F | HEART RATE: 88 BPM | DIASTOLIC BLOOD PRESSURE: 78 MMHG | HEIGHT: 63 IN

## 2019-04-22 DIAGNOSIS — M79.645 THUMB PAIN, LEFT: Primary | ICD-10-CM

## 2019-04-22 DIAGNOSIS — M79.645 THUMB PAIN, LEFT: ICD-10-CM

## 2019-04-22 PROCEDURE — 73140 X-RAY EXAM OF FINGER(S): CPT | Mod: LT

## 2019-04-22 PROCEDURE — 99213 OFFICE O/P EST LOW 20 MIN: CPT | Performed by: FAMILY MEDICINE

## 2019-04-22 ASSESSMENT — MIFFLIN-ST. JEOR: SCORE: 1123.24

## 2019-04-22 NOTE — PATIENT INSTRUCTIONS
Wear thumb splint most of time for next 2-3 weeks  Alternate ice & heat.  Use Ice massage on trigger point areas.  Do gentle Range of motion exercises

## 2019-04-22 NOTE — PROGRESS NOTES
"  SUBJECTIVE:   Aileen Raygoza is a 83 year old female who presents to clinic today for the following   health issues:    Musculoskeletal problem/pain      Duration: 1 month    Description  Location: LT thumb    Intensity:  moderate    Accompanying signs and symptoms: none    History  Previous similar problem: no   Previous evaluation:  none    Precipitating or alleviating factors:  Trauma or overuse: YES- slipped and fell on ice while leaving [place]  Aggravating factors include: bending thumb    Therapies tried and outcome: nothing    Pain with moving thumb. Clicking sensation with flexing or extending thumb      Additional history: as documented    Reviewed  and updated as needed this visit by clinical staff  Tobacco  Allergies  Meds  Problems  Med Hx  Surg Hx  Fam Hx  Soc Hx          Reviewed and updated as needed this visit by Provider  Tobacco  Allergies  Meds  Problems  Med Hx  Surg Hx  Fam Hx         Labs reviewed in EPIC    ROS:  CONSTITUTIONAL:NEGATIVE for fever, chills, change in weight  MUSCULOSKELETAL: POSITIVE  for injury to Lt thumb    OBJECTIVE:                                                    /78 (BP Location: Left arm, Patient Position: Sitting, Cuff Size: Adult Regular)   Pulse 88   Temp 98.2  F (36.8  C) (Tympanic)   Resp 16   Ht 1.594 m (5' 2.75\")   Wt 70.3 kg (155 lb)   LMP  (LMP Unknown)   SpO2 98%   BMI 27.68 kg/m    Body mass index is 27.68 kg/m .  GENERAL APPEARANCE: healthy, alert and no distress  MS: extremities normal- no gross deformities noted  ORTHO: Hand/Finger Exam: Inspection:Thumb:  slight swelling  Tender: Thumb:   proximal phalanx, MCP joint, triggering  Non-tender: Thumb:   distal phalanx, PIP joint  Range of Motion Thumb:   opposition   full, flexion MCP   full, extension MCP   full, flexion IP   full, extension IP   full  Strength: full strength  Special tests: none          Diagnostic test results:  Xray - Lt thumb: no Fx seen, moderate degen " arthritis at MP joint I viewed xray myself, radiology report pending        ASSESSMENT/PLAN:                                                        ICD-10-CM    1. Thumb pain, left M79.645 XR Finger Left G/E 2 Views       Follow up with Provider - 1 mo  Thumb spika splint fitted for Pt   Patient Instructions   Wear thumb splint most of time for next 2-3 weeks  Alternate ice & heat.  Use Ice massage on trigger point areas.  Do gentle Range of motion exercises      Luis Antonio Mitchell MD  [facility]

## 2019-05-20 ENCOUNTER — TRANSFERRED RECORDS (OUTPATIENT)
Dept: HEALTH INFORMATION MANAGEMENT | Facility: CLINIC | Age: 84
End: 2019-05-20

## 2019-06-05 ENCOUNTER — OFFICE VISIT (OUTPATIENT)
Dept: FAMILY MEDICINE | Facility: CLINIC | Age: 84
End: 2019-06-05
Payer: MEDICARE

## 2019-06-05 VITALS
DIASTOLIC BLOOD PRESSURE: 64 MMHG | BODY MASS INDEX: 27.11 KG/M2 | RESPIRATION RATE: 14 BRPM | HEART RATE: 95 BPM | OXYGEN SATURATION: 97 % | HEIGHT: 63 IN | TEMPERATURE: 98.3 F | SYSTOLIC BLOOD PRESSURE: 136 MMHG | WEIGHT: 153 LBS

## 2019-06-05 DIAGNOSIS — J04.0 LARYNGITIS: Primary | ICD-10-CM

## 2019-06-05 PROCEDURE — 99213 OFFICE O/P EST LOW 20 MIN: CPT | Performed by: FAMILY MEDICINE

## 2019-06-05 RX ORDER — INFLUENZA A VIRUS A/VICTORIA/4897/2022 IVR-238 (H1N1) ANTIGEN (FORMALDEHYDE INACTIVATED), INFLUENZA A VIRUS A/CALIFORNIA/122/2022 SAN-022 (H3N2) ANTIGEN (FORMALDEHYDE INACTIVATED), AND INFLUENZA B VIRUS B/MICHIGAN/01/2021 ANTIGEN (FORMALDEHYDE INACTIVATED) 60; 60; 60 UG/.5ML; UG/.5ML; UG/.5ML
INJECTION, SUSPENSION INTRAMUSCULAR
Refills: 0 | COMMUNITY
Start: 2018-10-17 | End: 2020-02-06

## 2019-06-05 ASSESSMENT — MIFFLIN-ST. JEOR: SCORE: 1109.16

## 2019-06-05 NOTE — PROGRESS NOTES
"Subjective     Aileen Raygoza is a 84 year old female who presents to clinic today for the following health issues:    HPI   RESPIRATORY SYMPTOMS      Duration: Started on Monday    Description  hoarse voice    Severity: mild    Accompanying signs and symptoms: Slight cough at night    History (predisposing factors):  none    Precipitating or alleviating factors: None    Therapies tried and outcome:  none      Seen by dentist on Monday for routine cleaning.  Does not really feel sick or ill.  Cough is very minimal and non-productive.  No fever, sore throat, ear pain, or sinus congestion.  Np SOB or chest pain.    Reviewed and updated as needed this visit by Provider  Tobacco  Allergies  Meds  Problems  Med Hx  Surg Hx  Fam Hx         Review of Systems   ROS COMP: CONSTITUTIONAL: NEGATIVE for fever, chills, change in weight  INTEGUMENTARY/SKIN: NEGATIVE for worrisome rashes, moles or lesions  EYES: NEGATIVE for vision changes or irritation  ENT/MOUTH: NEGATIVE for ear, mouth and throat problems  CV: NEGATIVE for chest pain, palpitations or peripheral edema  GI: NEGATIVE for nausea, abdominal pain, heartburn, or change in bowel habits  : NEGATIVE for frequency, dysuria, or hematuria  MUSCULOSKELETAL: NEGATIVE for significant arthralgias or myalgia  NEURO: NEGATIVE for weakness, dizziness or paresthesias  HEME: NEGATIVE for bleeding problems      Objective    /64 (Patient Position: Sitting, Cuff Size: Adult Regular)   Pulse 95   Temp 98.3  F (36.8  C) (Tympanic)   Resp 14   Ht 1.594 m (5' 2.75\")   Wt 69.4 kg (153 lb)   LMP  (LMP Unknown)   SpO2 97%   BMI 27.32 kg/m    Body mass index is 27.32 kg/m .  Physical Exam   GENERAL: Alert and no distress  EYES: Eyes grossly normal to inspection, PERRL and conjunctivae and sclerae normal  HENT: ear canals and TM's normal, nose and mouth without ulcers or lesions  NECK: no adenopathy, no asymmetry, masses, or scars and thyroid normal to palpation  RESP: " "lungs clear to auscultation - no rales, rhonchi or wheezes.  +mild laryngitis  CV: regular rate and rhythm, normal S1 S2, no S3 or S4, no murmur, click or rub, no peripheral edema and peripheral pulses strong  ABDOMEN: soft, nontender, no hepatosplenomegaly, no masses and bowel sounds normal  MS: no gross musculoskeletal defects noted, no edema  SKIN: no suspicious lesions or rashes  NEURO: Normal strength and tone, mentation intact and speech normal    Diagnostic Test Results:  Labs reviewed in Epic  none         Assessment & Plan   Problem List Items Addressed This Visit     None      Visit Diagnoses     Laryngitis    -  Primary        No hx COPD or Asthma.  Laryngitis most likely secondary to Viral URI.     Symptomatic support recommended: rest voice, increase fluids/hydration, etc.  Will RTC in 2 weeks if not any better or sooner if needed.    BMI:   Estimated body mass index is 27.32 kg/m  as calculated from the following:    Height as of this encounter: 1.594 m (5' 2.75\").    Weight as of this encounter: 69.4 kg (153 lb).           See Patient Instructions  Return in about 2 weeks (around 6/19/2019) for If Not Getting Any Better.    Leola Bal, DO  [facility]      "

## 2019-06-05 NOTE — PATIENT INSTRUCTIONS
Patient Education     Laryngitis    Laryngitis is a swelling of the tissues around the vocal cords. Symptoms include a hoarse (scratchy) voice. Or your voice may be gone for a few days or longer. This may be caused by a viral illness, such as a head or chest cold. It may also be due to overuse and strain of your voice. Smoking, drinking alcohol, acid reflux, allergies, or inhaling harsh chemicals may also lead to symptoms. This condition will usually go away in 1-2 weeks.  Home care    Rest your voice until it recovers. Talk as little as possible. If your symptoms are severe, rest at home for a day or so.    Moist air may help your symptoms. Try breathing cool steam from a humidifier or vaporizer. Or breathe air from a steamy shower.    Drink plenty of fluids to stay well hydrated.    Do not smoke  Follow-up care  Follow up with your healthcare provider or this facility if you are not better after 1 week. If your hoarse voice lasts more than 2 weeks, you may need to see an otolaryngologist. This is a doctor who treats diseases and disorders of the ear, nose, and throat (ENT). Seeing this doctor is especially important if you have a history of alcohol or tobacco use.  When to seek medical advice  Contact your healthcare provider if you have any of the following:    Symptoms that get worse    Severe pain with swallowing    Trouble opening your mouth    Neck swelling, neck pain, or trouble moving your neck    Noisy breathing or trouble breathing    Fever of 100.4 F (38. C) or higher, or as directed by your healthcare provider    Drooling    Symptoms do not go away in 2 weeks  Date Last Reviewed: 11/1/2017 2000-2018 [employer]. [address] [place] [address], [place] [address]. All rights reserved. This information is not intended as a substitute for professional medical care. Always follow your healthcare professional's instructions.

## 2019-08-05 DIAGNOSIS — I48.0 PAROXYSMAL ATRIAL FIBRILLATION (H): ICD-10-CM

## 2019-10-07 DIAGNOSIS — I48.0 PAROXYSMAL ATRIAL FIBRILLATION (H): ICD-10-CM

## 2020-02-05 ENCOUNTER — HOSPITAL ENCOUNTER (OUTPATIENT)
Facility: CLINIC | Age: 85
Discharge: HOME | End: 2020-02-05
Attending: COLON & RECTAL SURGERY | Admitting: COLON & RECTAL SURGERY
Payer: MEDICARE

## 2020-02-05 VITALS
HEART RATE: 85 BPM | DIASTOLIC BLOOD PRESSURE: 103 MMHG | SYSTOLIC BLOOD PRESSURE: 121 MMHG | WEIGHT: 151 LBS | RESPIRATION RATE: 13 BRPM | OXYGEN SATURATION: 99 % | HEIGHT: 64 IN | BODY MASS INDEX: 25.78 KG/M2

## 2020-02-05 LAB — COLONOSCOPY: NORMAL

## 2020-02-05 PROCEDURE — 88305 TISSUE EXAM BY PATHOLOGIST: CPT | Mod: 26 | Performed by: COLON & RECTAL SURGERY

## 2020-02-05 PROCEDURE — 25000128 H RX IP 250 OP 636: Performed by: COLON & RECTAL SURGERY

## 2020-02-05 PROCEDURE — 45380 COLONOSCOPY AND BIOPSY: CPT | Mod: PT | Performed by: COLON & RECTAL SURGERY

## 2020-02-05 PROCEDURE — 88305 TISSUE EXAM BY PATHOLOGIST: CPT | Performed by: COLON & RECTAL SURGERY

## 2020-02-05 PROCEDURE — G0500 MOD SEDAT ENDO SERVICE >5YRS: HCPCS | Performed by: COLON & RECTAL SURGERY

## 2020-02-05 RX ORDER — FENTANYL CITRATE 50 UG/ML
INJECTION, SOLUTION INTRAMUSCULAR; INTRAVENOUS PRN
Status: DISCONTINUED | OUTPATIENT
Start: 2020-02-05 | End: 2020-02-05 | Stop reason: HOSPADM

## 2020-02-05 RX ORDER — FLUMAZENIL 0.1 MG/ML
0.2 INJECTION, SOLUTION INTRAVENOUS
Status: DISCONTINUED | OUTPATIENT
Start: 2020-02-05 | End: 2020-02-05 | Stop reason: HOSPADM

## 2020-02-05 RX ORDER — ONDANSETRON 2 MG/ML
4 INJECTION INTRAMUSCULAR; INTRAVENOUS
Status: DISCONTINUED | OUTPATIENT
Start: 2020-02-05 | End: 2020-02-05 | Stop reason: HOSPADM

## 2020-02-05 RX ORDER — LIDOCAINE 40 MG/G
CREAM TOPICAL
Status: DISCONTINUED | OUTPATIENT
Start: 2020-02-05 | End: 2020-02-05 | Stop reason: HOSPADM

## 2020-02-05 RX ORDER — ONDANSETRON 4 MG/1
4 TABLET, ORALLY DISINTEGRATING ORAL EVERY 6 HOURS PRN
Status: DISCONTINUED | OUTPATIENT
Start: 2020-02-05 | End: 2020-02-05 | Stop reason: HOSPADM

## 2020-02-05 RX ORDER — NALOXONE HYDROCHLORIDE 0.4 MG/ML
.1-.4 INJECTION, SOLUTION INTRAMUSCULAR; INTRAVENOUS; SUBCUTANEOUS
Status: DISCONTINUED | OUTPATIENT
Start: 2020-02-05 | End: 2020-02-05 | Stop reason: HOSPADM

## 2020-02-05 RX ORDER — ONDANSETRON 2 MG/ML
4 INJECTION INTRAMUSCULAR; INTRAVENOUS EVERY 6 HOURS PRN
Status: DISCONTINUED | OUTPATIENT
Start: 2020-02-05 | End: 2020-02-05 | Stop reason: HOSPADM

## 2020-02-05 ASSESSMENT — MIFFLIN-ST. JEOR: SCORE: 1119.93

## 2020-02-05 NOTE — H&P
Pre-Endoscopy History and Physical     Aileen Raygoza MRN# [id number]   YOB: 1935 Age: 84 year old     Date of Procedure: 2/5/2020  Primary care provider: Kristina Beltran  Type of Endoscopy: colonoscopy  Reason for Procedure: screening  Type of Anesthesia Anticipated: moderate sedation    HPI:    Aileen is a 84 year old female who will be undergoing the above procedure.  Patient has a personal history of polyps.  Patient denies a change in her bowel habits or bleeding.     A history and physical has been performed. The patient's medications and allergies have been reviewed. The risks and benefits of the procedure and the sedation options and risks were discussed with the patient.  All questions were answered and informed consent was obtained.      She denies a personal or family history of anesthesia complications or bleeding disorders.   Prior to Admission medications    Medication Sig Start Date End Date Taking? Authorizing Provider   atorvastatin (LIPITOR) 20 MG tablet Take 1 tablet (20 mg) by mouth daily 4/3/19  Yes Kristina Beltran PA-C   Calcium Carbonate-Vitamin D (CALCIUM + D PO) Take 1 tablet by mouth daily   Yes Reported, Patient   Cholecalciferol (VITAMIN D PO)    Yes Reported, Patient   lisinopril (PRINIVIL/ZESTRIL) 20 MG tablet Take 1 tablet (20 mg) by mouth daily 4/3/19  Yes Kristina Beltran PA-C   multivitamin (OCUVITE) TABS Take 1 tablet by mouth 2 times daily   Yes Reported, Patient   FLUZONE HIGH-DOSE 0.5 ML injection Inject as directed.  To be administered by a pharmacist. 10/17/18   Reported, Patient   rivaroxaban ANTICOAGULANT (XARELTO) 20 MG TABS tablet Take 1 tablet (20 mg) by mouth daily (with dinner) 10/7/19   Anastacio Devlin MD       Allergies   Allergen Reactions     Simvastatin Muscle Pain (Myalgia)        Current Facility-Administered Medications   Medication     lidocaine (LMX4) cream     lidocaine 1 % 0.1-1 mL      "ondansetron (ZOFRAN) injection 4 mg     sodium chloride (PF) 0.9% PF flush 3 mL     sodium chloride (PF) 0.9% PF flush 3 mL       Patient Active Problem List   Diagnosis     Cervicalgia     Dizziness     Tinnitus     Advanced directives, counseling/discussion     Hypertension goal BP (blood pressure) < 140/90     Paroxysmal supraventricular tachycardia (H)     Premature beats     Hyperlipidemia with target LDL less than 100     Nausea     Paroxysmal atrial fibrillation (H)     Pain in the coccyx        Past Medical History:   Diagnosis Date     Hyperlipidemia LDL goal < 100     on Choles Off original     Hypertension goal BP (blood pressure) < 140/90      Other premature beats      Paroxysmal atrial fibrillation (H) 3/4/14     Paroxysmal supraventricular tachycardia (H) 3/4/14     Syncope      Tinnitus of left ear         Past Surgical History:   Procedure Laterality Date     D & C  1958    miscarriage     TONSILLECTOMY & ADENOIDECTOMY  1948       Social History     Tobacco Use     Smoking status: Never Smoker     Smokeless tobacco: Never Used   Substance Use Topics     Alcohol use: Yes     Alcohol/week: 0.0 standard drinks     Comment: 1 at [place]       Family History   Problem Relation Age of Onset     Cancer - colorectal Mother      Heart Disease Father      Alzheimer Disease Brother      Neurologic Disorder Brother      Heart Disease Sister      Neurologic Disorder Sister        REVIEW OF SYSTEMS:     5 point ROS negative except as noted above in HPI, including Gen., Resp., CV, GI &  system review.      PHYSICAL EXAM:   BP (!) 153/101   Pulse 102   Resp 18   Ht 1.626 m (5' 4\")   Wt 68.5 kg (151 lb)   LMP  (LMP Unknown)   SpO2 97%   BMI 25.92 kg/m   Estimated body mass index is 25.92 kg/m  as calculated from the following:    Height as of this encounter: 1.626 m (5' 4\").    Weight as of this encounter: 68.5 kg (151 lb).   GENERAL APPEARANCE: healthy  MENTAL STATUS: alert  AIRWAY EXAM: Mallampatti " Class II (visualization of the soft palate, fauces, and uvula)  RESP: lungs clear to auscultation - no rales, rhonchi or wheezes  CV: regular rates and rhythm      DIAGNOSTICS:    Not indicated      IMPRESSION   ASA Class 2 - Mild systemic disease        PLAN:       Colonoscopy with possible polypectomy, possible biopsy. The indications, procedure and risks were explained to the patient who agrees to proceed.       The above has been forwarded to the consulting provider.      Signed Electronically by: Kelsea Cabezas MD  February 5, 2020

## 2020-02-05 NOTE — BRIEF OP NOTE
[facility]    Brief Operative Note    Pre-operative diagnosis: Personal history of colonic polyps [Z86.010]  Family history of malignant neoplasm of gastrointestinal tract [Z80.0]  Post-operative diagnosis colon polyp, diverticulosis    Procedure: Procedure(s):  COLONOSCOPY, WITH POLYPECTOMY AND BIOPSY  Surgeon: Surgeon(s) and Role:     * Kelsea Cabezas MD - Primary  Anesthesia: Conscious Sedation   Estimated blood loss: None  Drains: None  Specimens:   ID Type Source Tests Collected by Time Destination   A :  Polyp Large Intestine, Transverse SURGICAL PATHOLOGY EXAM Kelsea Cabezas MD 2/5/2020 12:09 PM      Findings:   See Provation procedure note in Epic    Complications: None.  Implants: * No implants in log *

## 2020-02-06 LAB — COPATH REPORT: NORMAL

## 2020-03-25 DIAGNOSIS — E78.5 HYPERLIPIDEMIA WITH TARGET LDL LESS THAN 100: ICD-10-CM

## 2020-03-25 DIAGNOSIS — I10 HYPERTENSION GOAL BP (BLOOD PRESSURE) < 140/90: ICD-10-CM

## 2020-03-25 RX ORDER — LISINOPRIL 20 MG/1
TABLET ORAL
Qty: 90 TABLET | Refills: 0 | Status: SHIPPED | OUTPATIENT
Start: 2020-03-25 | End: 2020-06-16

## 2020-03-25 RX ORDER — ATORVASTATIN CALCIUM 20 MG/1
TABLET, FILM COATED ORAL
Qty: 90 TABLET | Refills: 0 | Status: SHIPPED | OUTPATIENT
Start: 2020-03-25 | End: 2020-06-16

## 2020-03-25 NOTE — TELEPHONE ENCOUNTER
"Requested Prescriptions   Pending Prescriptions Disp Refills     atorvastatin (LIPITOR) 20 MG tablet [Pharmacy Med Name: Atorvastatin Calcium 20 MG Oral Tablet]  Last Written Prescription Date:  4/3/2019  Last Fill Quantity: 90 tablet,  # refills: 3   Last office visit: 6/5/2019 with prescribing provider:  Valeriano   Future Office Visit:     90 tablet 0     Sig: Take 1 tablet by mouth once daily       Statins Protocol Passed - 3/25/2020 10:03 AM        Passed - LDL on file in past 12 months     Recent Labs   Lab Test 04/03/19  1002   LDL 76             Passed - No abnormal creatine kinase in past 12 months     No lab results found.             Passed - Recent (12 mo) or future (30 days) visit within the authorizing provider's specialty     Patient has had an office visit with the authorizing provider or a provider within the authorizing providers department within the previous 12 mos or has a future within next 30 days. See \"Patient Info\" tab in inbasket, or \"Choose Columns\" in Meds & Orders section of the refill encounter.              Passed - Medication is active on med list        Passed - Patient is age 18 or older        Passed - No active pregnancy on record        Passed - No positive pregnancy test in past 12 months           lisinopril (ZESTRIL) 20 MG tablet [Pharmacy Med Name: Lisinopril 20 MG Oral Tablet]  Last Written Prescription Date:  4/3/2019  Last Fill Quantity: 90 tablet,  # refills: 3   Last office visit: 6/5/2019 with prescribing provider:  Valeriano   Future Office Visit:     90 tablet 0     Sig: Take 1 tablet by mouth once daily       ACE Inhibitors (Including Combos) Protocol Failed - 3/25/2020 10:03 AM        Failed - Blood pressure under 140/90 in past 12 months     BP Readings from Last 3 Encounters:   02/05/20 (!) 121/103   06/05/19 136/64   04/22/19 130/78                 Passed - Recent (12 mo) or future (30 days) visit within the authorizing provider's specialty     Patient has had an " "office visit with the authorizing provider or a provider within the authorizing providers department within the previous 12 mos or has a future within next 30 days. See \"Patient Info\" tab in inbasket, or \"Choose Columns\" in Meds & Orders section of the refill encounter.              Passed - Medication is active on med list        Passed - Patient is age 18 or older        Passed - No active pregnancy on record        Passed - Normal serum creatinine on file in past 12 months     Recent Labs   Lab Test 04/03/19  1002   CR 0.59       Ok to refill medication if creatinine is low          Passed - Normal serum potassium on file in past 12 months     Recent Labs   Lab Test 04/03/19  1002   POTASSIUM 4.0             Passed - No positive pregnancy test within past 12 months              "

## 2020-03-25 NOTE — TELEPHONE ENCOUNTER
Routing refill request to provider for review/approval because:  Elevated BP  Allergy contraindication

## 2020-06-12 DIAGNOSIS — I10 HYPERTENSION GOAL BP (BLOOD PRESSURE) < 140/90: ICD-10-CM

## 2020-06-12 DIAGNOSIS — E78.5 HYPERLIPIDEMIA WITH TARGET LDL LESS THAN 100: ICD-10-CM

## 2020-06-16 RX ORDER — LISINOPRIL 20 MG/1
TABLET ORAL
Qty: 30 TABLET | Refills: 0 | Status: SHIPPED | OUTPATIENT
Start: 2020-06-16 | End: 2020-06-17

## 2020-06-16 RX ORDER — ATORVASTATIN CALCIUM 20 MG/1
TABLET, FILM COATED ORAL
Qty: 30 TABLET | Refills: 0 | Status: SHIPPED | OUTPATIENT
Start: 2020-06-16 | End: 2020-06-17

## 2020-06-17 ENCOUNTER — VIRTUAL VISIT (OUTPATIENT)
Dept: FAMILY MEDICINE | Facility: CLINIC | Age: 85
End: 2020-06-17
Payer: MEDICARE

## 2020-06-17 DIAGNOSIS — E78.5 HYPERLIPIDEMIA WITH TARGET LDL LESS THAN 100: ICD-10-CM

## 2020-06-17 DIAGNOSIS — I10 HYPERTENSION GOAL BP (BLOOD PRESSURE) < 140/90: ICD-10-CM

## 2020-06-17 PROCEDURE — 99441 ZZC PHYSICIAN TELEPHONE EVALUATION 5-10 MIN: CPT | Performed by: PHYSICIAN ASSISTANT

## 2020-06-17 RX ORDER — ATORVASTATIN CALCIUM 20 MG/1
20 TABLET, FILM COATED ORAL DAILY
Qty: 90 TABLET | Refills: 3 | Status: SHIPPED | OUTPATIENT
Start: 2020-06-17 | End: 2021-06-09

## 2020-06-17 RX ORDER — LISINOPRIL 20 MG/1
20 TABLET ORAL DAILY
Qty: 90 TABLET | Refills: 3 | Status: SHIPPED | OUTPATIENT
Start: 2020-06-17 | End: 2021-06-09

## 2020-06-17 NOTE — PROGRESS NOTES
"Aileen Raygoza is a 85 year old female who is being evaluated via a billable telephone visit.      The patient has been notified of following:     \"This telephone visit will be conducted via a call between you and your physician/provider. We have found that certain health care needs can be provided without the need for a physical exam.  This service lets us provide the care you need with a short phone conversation.  If a prescription is necessary we can send it directly to your pharmacy.  If lab work is needed we can place an order for that and you can then stop by our lab to have the test done at a later time.    Telephone visits are billed at different rates depending on your insurance coverage. During this emergency period, for some insurers they may be billed the same as an in-person visit.  Please reach out to your insurance provider with any questions.    If during the course of the call the physician/provider feels a telephone visit is not appropriate, you will not be charged for this service.\"    Patient has given verbal consent for Telephone visit?  Yes    What phone number would you like to be contacted at? [phone]    How would you like to obtain your AVS? Mail a copy    Subjective     Aileen Raygoza is a 85 year old female who presents via phone visit today for the following health issues:    HPI  Hyperlipidemia Follow-Up      Are you regularly taking any medication or supplement to lower your cholesterol?   Yes- lipitor    Are you having muscle aches or other side effects that you think could be caused by your cholesterol lowering medication?  No    Hypertension Follow-up      Do you check your blood pressure regularly outside of the clinic? No     Are you following a low salt diet? Yes    Are your blood pressures ever more than 140 on the top number (systolic) OR more   than 90 on the bottom number (diastolic), for example 140/90? No      How many servings of fruits and vegetables do you eat " daily?  2-3    On average, how many sweetened beverages do you drink each day (Examples: soda, juice, sweet tea, etc.  Do NOT count diet or artificially sweetened beverages)?   0    How many days per week do you exercise enough to make your heart beat faster? 3 or less    How many minutes a day do you exercise enough to make your heart beat faster? 20 - 29    How many days per week do you miss taking your medication? 0  Additional provider notes:                    Recent Labs   Lab Test 04/03/19  1002 01/15/18  1052 05/09/17  0934  11/19/15  0854 04/13/15  1001 03/04/14  1433   LDL 76 57 54   < > 95 155* 131*   HDL 65 92 77   < > 72 77 68   TRIG 115 119 98   < > 108 80 75   ALT  --   --   --   --  33 32 54*   CR 0.59 0.58 0.55   < > 0.80 0.70 0.90   GFRESTIMATED 84 >90 >90  Non African American GFR Calc     < > 69 81 61   GFRESTBLACK >90 >90 >90  African American GFR Calc     < > 84 >90 73   POTASSIUM 4.0 4.1 3.8   < > 4.0 3.6 3.9   TSH  --   --   --   --   --   --  2.29    < > = values in this interval not displayed.      BP Readings from Last 3 Encounters:   02/05/20 (!) 121/103   06/05/19 136/64   04/22/19 130/78    Wt Readings from Last 3 Encounters:   02/05/20 68.5 kg (151 lb)   06/05/19 69.4 kg (153 lb)   04/22/19 70.3 kg (155 lb)                    Reviewed and updated as needed this visit by Provider  Tobacco  Allergies  Meds  Problems  Med Hx  Surg Hx  Fam Hx         Review of Systems   Constitutional, HEENT, cardiovascular, pulmonary, GI, , musculoskeletal, neuro, skin, endocrine and psych systems are negative, except as otherwise noted.       Objective   Reported vitals:  LMP  (LMP Unknown)    healthy, alert and no distress  Psych: Alert and oriented times 3; coherent speech, normal   rate and volume, able to articulate logical thoughts, able   to abstract reason, no tangential thoughts, no hallucinations   or delusions  Her affect is normal     Diagnostic Test Results:  Labs reviewed in Epic         Assessment/Plan:  1. Hypertension goal BP (blood pressure) < 140/90    - lisinopril (ZESTRIL) 20 MG tablet; Take 1 tablet (20 mg) by mouth daily  Dispense: 90 tablet; Refill: 3  - Basic metabolic panel; Future    2. Hyperlipidemia with target LDL less than 100    - atorvastatin (LIPITOR) 20 MG tablet; Take 1 tablet (20 mg) by mouth daily  Dispense: 90 tablet; Refill: 3  - Lipid panel reflex to direct LDL Fasting; Future      A physical exam was not possible today due to the COVID19 pandemic crisis for which we are trying to keep all patients safe and at home.  Clinical decision making was based on history as presented by the patient and answers to follow up questions as noted above.  Any lab orders that are needed will be put in as future orders so that the patient can come in for a lab only visit to minimize the amount of time spent in the clinic.    Return in about 1 month (around 7/17/2020) for Fasting Lab Only Visit.      Call Start Time: 9:24 AM   Call End Time:  9:29 AM     Phone call duration:  5 minutes      Kristina Beltran PA-C    Family Medicine  [facility]

## 2020-07-20 DIAGNOSIS — E78.5 HYPERLIPIDEMIA WITH TARGET LDL LESS THAN 100: ICD-10-CM

## 2020-07-20 DIAGNOSIS — I10 HYPERTENSION GOAL BP (BLOOD PRESSURE) < 140/90: ICD-10-CM

## 2020-07-20 LAB
ANION GAP SERPL CALCULATED.3IONS-SCNC: 6 MMOL/L (ref 3–14)
BUN SERPL-MCNC: 11 MG/DL (ref 7–30)
CALCIUM SERPL-MCNC: 8.7 MG/DL (ref 8.5–10.1)
CHLORIDE SERPL-SCNC: 108 MMOL/L (ref 94–109)
CHOLEST SERPL-MCNC: 137 MG/DL
CO2 SERPL-SCNC: 27 MMOL/L (ref 20–32)
CREAT SERPL-MCNC: 0.59 MG/DL (ref 0.52–1.04)
GFR SERPL CREATININE-BSD FRML MDRD: 83 ML/MIN/{1.73_M2}
GLUCOSE SERPL-MCNC: 97 MG/DL (ref 70–99)
HDLC SERPL-MCNC: 69 MG/DL
LDLC SERPL CALC-MCNC: 55 MG/DL
NONHDLC SERPL-MCNC: 68 MG/DL
POTASSIUM SERPL-SCNC: 3.9 MMOL/L (ref 3.4–5.3)
SODIUM SERPL-SCNC: 141 MMOL/L (ref 133–144)
TRIGL SERPL-MCNC: 67 MG/DL

## 2020-07-20 PROCEDURE — 80061 LIPID PANEL: CPT | Performed by: PHYSICIAN ASSISTANT

## 2020-07-20 PROCEDURE — 36415 COLL VENOUS BLD VENIPUNCTURE: CPT | Performed by: PHYSICIAN ASSISTANT

## 2020-07-20 PROCEDURE — 80048 BASIC METABOLIC PNL TOTAL CA: CPT | Performed by: PHYSICIAN ASSISTANT

## 2020-08-11 ENCOUNTER — TRANSFERRED RECORDS (OUTPATIENT)
Dept: HEALTH INFORMATION MANAGEMENT | Facility: CLINIC | Age: 85
End: 2020-08-11

## 2020-10-07 DIAGNOSIS — I48.0 PAROXYSMAL ATRIAL FIBRILLATION (H): ICD-10-CM

## 2020-11-24 ENCOUNTER — OFFICE VISIT (OUTPATIENT)
Dept: CARDIOLOGY | Facility: CLINIC | Age: 85
End: 2020-11-24
Payer: MEDICARE

## 2020-11-24 VITALS
SYSTOLIC BLOOD PRESSURE: 127 MMHG | BODY MASS INDEX: 25.64 KG/M2 | WEIGHT: 150.2 LBS | HEART RATE: 96 BPM | DIASTOLIC BLOOD PRESSURE: 80 MMHG | HEIGHT: 64 IN

## 2020-11-24 DIAGNOSIS — I48.0 PAROXYSMAL ATRIAL FIBRILLATION (H): Primary | ICD-10-CM

## 2020-11-24 PROCEDURE — 99214 OFFICE O/P EST MOD 30 MIN: CPT | Performed by: NURSE PRACTITIONER

## 2020-11-24 PROCEDURE — 93000 ELECTROCARDIOGRAM COMPLETE: CPT | Performed by: NURSE PRACTITIONER

## 2020-11-24 ASSESSMENT — MIFFLIN-ST. JEOR: SCORE: 1111.3

## 2020-11-24 NOTE — LETTER
11/24/2020    Kristina Beltran PA-C  [address]  [facility] [address]    RE: Aileen Mccormickarlette       Dear Colleague,    I had the pleasure of seeing Aileen Raygoza in the [facility].    HPI:  Aileen Raygoza is a 85 year old female who presents for annual cardiology visit.  She is a patient of Dr. Devlin.  Her medical history includes     1. Hypertension  2. paroxysmal AFib     Diagnostics:  ECHO (2014) revealed EF 60-65%.      In 2014, she was diagnosed with atrial fibrillation during a hospitalization for diarrhea.      In 3/2019, she met with Dr. Devlin and was doing well on Xarelto and no AV node blockers.     Today she reports no concerns.   She denies chest pain or pressure, headaches, dizziness, syncope, angina, dyspnea at rest or with exertion, dry cough, palpitations, orthopnea, PND, or edema.  She takes her medications as prescribed including Xarelto and denies bleeding, hematuria, hematochezia, epistaxis and signs/symptoms of stroke.       ASSESSMENT AND PLAN    Asymptomatic Paroxysmal Atrial fibrillation    Pt is asymptomatic while in atrial fibrillation.    For rate control she is taking no av node blockers.  ECG revealed sinus rhythm with ventricular rate of 99 bpm    For anticoagulation for CHADS VASC 4 (age++, female, HTN) she is currently taking Xarelto 20  mg daily.    If Eliquis is less expensive she can stop Xarelto 20 mg daily and when the Xarelto is due that evening start Eliquis 5 mg twice daily.     Hypertension     Controlled while taking lisinopril 20 mg daily    Plan:  Continue with current medications and follow up in 1 year with JACKI Otero CNP.      Patient expresses understanding and agreement with the plan.     I appreciate the chance to help with Aileen Raygoza Please let me know if you have any questions or concerns.    JACKI Otero, CNP    This note was completed in part using Dragon voice recognition  software. Although reviewed after completion, some word and grammatical errors may occur.    Orders Placed This Encounter   Procedures     Follow-Up with Cardiac Advanced Practice Provider     EKG 12-lead complete w/read - Clinics (performed today)     Orders Placed This Encounter   Medications     rivaroxaban ANTICOAGULANT (XARELTO) 20 MG TABS tablet     Sig: Take 1 tablet (20 mg) by mouth daily (with dinner)     Dispense:  90 tablet     Refill:  3     Medications Discontinued During This Encounter   Medication Reason     rivaroxaban ANTICOAGULANT (XARELTO) 20 MG TABS tablet          Encounter Diagnosis   Name Primary?     Paroxysmal atrial fibrillation (H) Yes       CURRENT MEDICATIONS:  Current Outpatient Medications   Medication Sig Dispense Refill     atorvastatin (LIPITOR) 20 MG tablet Take 1 tablet (20 mg) by mouth daily 90 tablet 3     Calcium Carbonate-Vitamin D (CALCIUM + D PO) Take 1 tablet by mouth daily       Cholecalciferol (VITAMIN D PO)        lisinopril (ZESTRIL) 20 MG tablet Take 1 tablet (20 mg) by mouth daily 90 tablet 3     multivitamin (OCUVITE) TABS Take 1 tablet by mouth 2 times daily       rivaroxaban ANTICOAGULANT (XARELTO) 20 MG TABS tablet Take 1 tablet (20 mg) by mouth daily (with dinner) 90 tablet 3       ALLERGIES     Allergies   Allergen Reactions     Simvastatin Muscle Pain (Myalgia)       PAST MEDICAL HISTORY:  Past Medical History:   Diagnosis Date     Hyperlipidemia LDL goal < 100     on Choles Off original     Hypertension goal BP (blood pressure) < 140/90      Other premature beats      Paroxysmal atrial fibrillation (H) 3/4/14     Paroxysmal supraventricular tachycardia (H) 3/4/14     Syncope      Tinnitus of left ear        PAST SURGICAL HISTORY:  Past Surgical History:   Procedure Laterality Date     COLONOSCOPY N/A 2/5/2020    Procedure: COLONOSCOPY, WITH POLYPECTOMY AND BIOPSY;  Surgeon: Kelsea Cabezas MD;  Location: [facility]     D & C  1958    miscarriage      TONSILLECTOMY & ADENOIDECTOMY  1948       FAMILY HISTORY:  Family History   Problem Relation Age of Onset     Cancer - colorectal Mother      Heart Disease Father      Alzheimer Disease Brother      Neurologic Disorder Brother      Heart Disease Sister      Neurologic Disorder Sister        SOCIAL HISTORY:  Social History     Socioeconomic History     Marital status:      Spouse name: None     Number of children: None     Years of education: None     Highest education level: None   Occupational History     None   Social Needs     Financial resource strain: None     Food insecurity     Worry: None     Inability: None     Transportation needs     Medical: None     Non-medical: None   Tobacco Use     Smoking status: Never Smoker     Smokeless tobacco: Never Used   Substance and Sexual Activity     Alcohol use: Yes     Alcohol/week: 0.0 standard drinks     Comment: 1 at [place]     Drug use: No     Sexual activity: Yes     Partners: Male   Lifestyle     Physical activity     Days per week: None     Minutes per session: None     Stress: None   Relationships     Social connections     Talks on phone: None     Gets together: None     Attends Jainism service: None     Active member of club or organization: None     Attends meetings of clubs or organizations: None     Relationship status: None     Intimate partner violence     Fear of current or ex partner: None     Emotionally abused: None     Physically abused: None     Forced sexual activity: None   Other Topics Concern     Parent/sibling w/ CABG, MI or angioplasty before 65F 55M? Not Asked   Social History Narrative     None       Review of Systems:  Skin:  Negative     Eyes:  Negative    ENT:  Negative    Respiratory:  Negative    Cardiovascular:  Negative;palpitations;chest pain;dizziness;lightheadedness;syncope or near-syncope;cyanosis;exercise intolerance;fatigue    Gastroenterology: Negative    Genitourinary:  Negative    Musculoskeletal:  Negative   "  Neurologic:  Negative    Psychiatric:  Positive for sleep disturbances  Heme/Lymph/Imm:  Negative    Endocrine:  Negative      Physical Exam:  Vitals: /80   Pulse 96   Ht 1.626 m (5' 4\")   Wt 68.1 kg (150 lb 3.2 oz)   LMP  (LMP Unknown)   BMI 25.78 kg/m      Constitutional:  cooperative, alert and oriented, well developed, well nourished, in no acute distress thin      Skin:  warm and dry to the touch, no apparent skin lesions or masses noted        Head:  normocephalic, no masses or lesions        Eyes:  pupils equal and round        ENT:  no pallor or cyanosis        Neck:  JVP normal        Chest:  clear to auscultation        Cardiac: regular rhythm                  Abdomen:  abdomen soft        Vascular: pulses full and equal     right radial artery;2+             left radial artery;2+                  Extremities and Back:  no edema        Neurological:  no gross motor deficits          Recent Lab Results:  LIPID RESULTS:  Lab Results   Component Value Date    CHOL 137 07/20/2020    HDL 69 07/20/2020    LDL 55 07/20/2020    TRIG 67 07/20/2020    CHOLHDLRATIO 3.2 04/13/2015       LIVER ENZYME RESULTS:  Lab Results   Component Value Date    AST 56 (H) 03/04/2014    ALT 33 11/19/2015       CBC RESULTS:  Lab Results   Component Value Date    WBC 4.4 04/03/2019    RBC 4.65 04/03/2019    HGB 14.4 04/03/2019    HCT 43.8 04/03/2019    MCV 94 04/03/2019    MCH 31.0 04/03/2019    MCHC 32.9 04/03/2019    RDW 13.0 04/03/2019     04/03/2019       BMP RESULTS:  Lab Results   Component Value Date     07/20/2020    POTASSIUM 3.9 07/20/2020    CHLORIDE 108 07/20/2020    CO2 27 07/20/2020    ANIONGAP 6 07/20/2020    GLC 97 07/20/2020    BUN 11 07/20/2020    CR 0.59 07/20/2020    GFRESTIMATED 83 07/20/2020    GFRESTBLACK >90 07/20/2020    NITESH 8.7 07/20/2020          Thank you for allowing me to participate in the care of your patient.    Sincerely,     JACKI Yung CNP     [facility]

## 2020-11-24 NOTE — PATIENT INSTRUCTIONS
Please call your insurance company for the cost Eliquis 5 mg twice daily.        If you have problems or questions please call the RNs (Mona Andrade & Ines) at [phone]

## 2020-11-24 NOTE — PROGRESS NOTES
HPI:  Aileen Raygoza is a 85 year old female who presents for annual cardiology visit.  She is a patient of Dr. Devlin.  Her medical history includes     1. Hypertension  2. paroxysmal AFib     Diagnostics:  ECHO (2014) revealed EF 60-65%.      In 2014, she was diagnosed with atrial fibrillation during a hospitalization for diarrhea.      In 3/2019, she met with Dr. Devlin and was doing well on Xarelto and no AV node blockers.     Today she reports no concerns.   She denies chest pain or pressure, headaches, dizziness, syncope, angina, dyspnea at rest or with exertion, dry cough, palpitations, orthopnea, PND, or edema.  She takes her medications as prescribed including Xarelto and denies bleeding, hematuria, hematochezia, epistaxis and signs/symptoms of stroke.       ASSESSMENT AND PLAN    Asymptomatic Paroxysmal Atrial fibrillation    Pt is asymptomatic while in atrial fibrillation.    For rate control she is taking no av node blockers.  ECG revealed sinus rhythm with ventricular rate of 99 bpm    For anticoagulation for CHADS VASC 4 (age++, female, HTN) she is currently taking Xarelto 20  mg daily.    If Eliquis is less expensive she can stop Xarelto 20 mg daily and when the Xarelto is due that evening start Eliquis 5 mg twice daily.     Hypertension     Controlled while taking lisinopril 20 mg daily    Plan:  Continue with current medications and follow up in 1 year with JACKI Otero CNP.      Patient expresses understanding and agreement with the plan.     I appreciate the chance to help with Aileen Raygoza Please let me know if you have any questions or concerns.    JACKI Otero, CNP    This note was completed in part using Dragon voice recognition software. Although reviewed after completion, some word and grammatical errors may occur.    Orders Placed This Encounter   Procedures     Follow-Up with Cardiac Advanced Practice Provider     EKG 12-lead complete w/read - Clinics (performed today)      Orders Placed This Encounter   Medications     rivaroxaban ANTICOAGULANT (XARELTO) 20 MG TABS tablet     Sig: Take 1 tablet (20 mg) by mouth daily (with dinner)     Dispense:  90 tablet     Refill:  3     Medications Discontinued During This Encounter   Medication Reason     rivaroxaban ANTICOAGULANT (XARELTO) 20 MG TABS tablet          Encounter Diagnosis   Name Primary?     Paroxysmal atrial fibrillation (H) Yes       CURRENT MEDICATIONS:  Current Outpatient Medications   Medication Sig Dispense Refill     atorvastatin (LIPITOR) 20 MG tablet Take 1 tablet (20 mg) by mouth daily 90 tablet 3     Calcium Carbonate-Vitamin D (CALCIUM + D PO) Take 1 tablet by mouth daily       Cholecalciferol (VITAMIN D PO)        lisinopril (ZESTRIL) 20 MG tablet Take 1 tablet (20 mg) by mouth daily 90 tablet 3     multivitamin (OCUVITE) TABS Take 1 tablet by mouth 2 times daily       rivaroxaban ANTICOAGULANT (XARELTO) 20 MG TABS tablet Take 1 tablet (20 mg) by mouth daily (with dinner) 90 tablet 3       ALLERGIES     Allergies   Allergen Reactions     Simvastatin Muscle Pain (Myalgia)       PAST MEDICAL HISTORY:  Past Medical History:   Diagnosis Date     Hyperlipidemia LDL goal < 100     on Choles Off original     Hypertension goal BP (blood pressure) < 140/90      Other premature beats      Paroxysmal atrial fibrillation (H) 3/4/14     Paroxysmal supraventricular tachycardia (H) 3/4/14     Syncope      Tinnitus of left ear        PAST SURGICAL HISTORY:  Past Surgical History:   Procedure Laterality Date     COLONOSCOPY N/A 2/5/2020    Procedure: COLONOSCOPY, WITH POLYPECTOMY AND BIOPSY;  Surgeon: Kelsea Cabezas MD;  Location:  [facility]     D & C  1958    miscarriage     TONSILLECTOMY & ADENOIDECTOMY  1948       FAMILY HISTORY:  Family History   Problem Relation Age of Onset     Cancer - colorectal Mother      Heart Disease Father      Alzheimer Disease Brother      Neurologic Disorder Brother      Heart Disease Sister       "Neurologic Disorder Sister        SOCIAL HISTORY:  Social History     Socioeconomic History     Marital status:      Spouse name: None     Number of children: None     Years of education: None     Highest education level: None   Occupational History     None   Social Needs     Financial resource strain: None     Food insecurity     Worry: None     Inability: None     Transportation needs     Medical: None     Non-medical: None   Tobacco Use     Smoking status: Never Smoker     Smokeless tobacco: Never Used   Substance and Sexual Activity     Alcohol use: Yes     Alcohol/week: 0.0 standard drinks     Comment: 1 at [place]     Drug use: No     Sexual activity: Yes     Partners: Male   Lifestyle     Physical activity     Days per week: None     Minutes per session: None     Stress: None   Relationships     Social connections     Talks on phone: None     Gets together: None     Attends Orthodoxy service: None     Active member of club or organization: None     Attends meetings of clubs or organizations: None     Relationship status: None     Intimate partner violence     Fear of current or ex partner: None     Emotionally abused: None     Physically abused: None     Forced sexual activity: None   Other Topics Concern     Parent/sibling w/ CABG, MI or angioplasty before 65F 55M? Not Asked   Social History Narrative     None       Review of Systems:  Skin:  Negative     Eyes:  Negative    ENT:  Negative    Respiratory:  Negative    Cardiovascular:  Negative;palpitations;chest pain;dizziness;lightheadedness;syncope or near-syncope;cyanosis;exercise intolerance;fatigue    Gastroenterology: Negative    Genitourinary:  Negative    Musculoskeletal:  Negative    Neurologic:  Negative    Psychiatric:  Positive for sleep disturbances  Heme/Lymph/Imm:  Negative    Endocrine:  Negative      Physical Exam:  Vitals: /80   Pulse 96   Ht 1.626 m (5' 4\")   Wt 68.1 kg (150 lb 3.2 oz)   LMP  (LMP Unknown)   BMI 25.78 " kg/m      Constitutional:  cooperative, alert and oriented, well developed, well nourished, in no acute distress thin      Skin:  warm and dry to the touch, no apparent skin lesions or masses noted        Head:  normocephalic, no masses or lesions        Eyes:  pupils equal and round        ENT:  no pallor or cyanosis        Neck:  JVP normal        Chest:  clear to auscultation        Cardiac: regular rhythm                  Abdomen:  abdomen soft        Vascular: pulses full and equal     right radial artery;2+             left radial artery;2+                  Extremities and Back:  no edema        Neurological:  no gross motor deficits          Recent Lab Results:  LIPID RESULTS:  Lab Results   Component Value Date    CHOL 137 07/20/2020    HDL 69 07/20/2020    LDL 55 07/20/2020    TRIG 67 07/20/2020    CHOLHDLRATIO 3.2 04/13/2015       LIVER ENZYME RESULTS:  Lab Results   Component Value Date    AST 56 (H) 03/04/2014    ALT 33 11/19/2015       CBC RESULTS:  Lab Results   Component Value Date    WBC 4.4 04/03/2019    RBC 4.65 04/03/2019    HGB 14.4 04/03/2019    HCT 43.8 04/03/2019    MCV 94 04/03/2019    MCH 31.0 04/03/2019    MCHC 32.9 04/03/2019    RDW 13.0 04/03/2019     04/03/2019       BMP RESULTS:  Lab Results   Component Value Date     07/20/2020    POTASSIUM 3.9 07/20/2020    CHLORIDE 108 07/20/2020    CO2 27 07/20/2020    ANIONGAP 6 07/20/2020    GLC 97 07/20/2020    BUN 11 07/20/2020    CR 0.59 07/20/2020    GFRESTIMATED 83 07/20/2020    GFRESTBLACK >90 07/20/2020    NITESH 8.7 07/20/2020        CC  No referring provider defined for this encounter.

## 2020-11-24 NOTE — LETTER
11/24/2020    Kristina Beltran PA-C  [address]  [facility] [address]    RE: Aileen Mccormickarlette       Dear Colleague,    I had the pleasure of seeing Aileen Raygoza in the [facility].    HPI:  Aileen Raygoza is a 85 year old female who presents for annual cardiology visit.  She is a patient of Dr. Devlin.  Her medical history includes     1. Hypertension  2. paroxysmal AFib     Diagnostics:  ECHO (2014) revealed EF 60-65%.      In 2014, she was diagnosed with atrial fibrillation during a hospitalization for diarrhea.      In 3/2019, she met with Dr. Devlin and was doing well on Xarelto and no AV node blockers.     Today she reports no concerns.   She denies chest pain or pressure, headaches, dizziness, syncope, angina, dyspnea at rest or with exertion, dry cough, palpitations, orthopnea, PND, or edema.  She takes her medications as prescribed including Xarelto and denies bleeding, hematuria, hematochezia, epistaxis and signs/symptoms of stroke.       ASSESSMENT AND PLAN    Asymptomatic Paroxysmal Atrial fibrillation    Pt is asymptomatic while in atrial fibrillation.    For rate control she is taking no av node blockers.  ECG revealed sinus rhythm with ventricular rate of 99 bpm    For anticoagulation for CHADS VASC 4 (age++, female, HTN) she is currently taking Xarelto 20  mg daily.    If Eliquis is less expensive she can stop Xarelto 20 mg daily and when the Xarelto is due that evening start Eliquis 5 mg twice daily.     Hypertension     Controlled while taking lisinopril 20 mg daily    Plan:  Continue with current medications and follow up in 1 year with JACKI Otero CNP.      Patient expresses understanding and agreement with the plan.     I appreciate the chance to help with Aileen Raygoza Please let me know if you have any questions or concerns.    JACKI Otero, CNP    This note was completed in part using Dragon voice recognition  software. Although reviewed after completion, some word and grammatical errors may occur.    Orders Placed This Encounter   Procedures     Follow-Up with Cardiac Advanced Practice Provider     EKG 12-lead complete w/read - Clinics (performed today)     Orders Placed This Encounter   Medications     rivaroxaban ANTICOAGULANT (XARELTO) 20 MG TABS tablet     Sig: Take 1 tablet (20 mg) by mouth daily (with dinner)     Dispense:  90 tablet     Refill:  3     Medications Discontinued During This Encounter   Medication Reason     rivaroxaban ANTICOAGULANT (XARELTO) 20 MG TABS tablet          Encounter Diagnosis   Name Primary?     Paroxysmal atrial fibrillation (H) Yes       CURRENT MEDICATIONS:  Current Outpatient Medications   Medication Sig Dispense Refill     atorvastatin (LIPITOR) 20 MG tablet Take 1 tablet (20 mg) by mouth daily 90 tablet 3     Calcium Carbonate-Vitamin D (CALCIUM + D PO) Take 1 tablet by mouth daily       Cholecalciferol (VITAMIN D PO)        lisinopril (ZESTRIL) 20 MG tablet Take 1 tablet (20 mg) by mouth daily 90 tablet 3     multivitamin (OCUVITE) TABS Take 1 tablet by mouth 2 times daily       rivaroxaban ANTICOAGULANT (XARELTO) 20 MG TABS tablet Take 1 tablet (20 mg) by mouth daily (with dinner) 90 tablet 3       ALLERGIES     Allergies   Allergen Reactions     Simvastatin Muscle Pain (Myalgia)       PAST MEDICAL HISTORY:  Past Medical History:   Diagnosis Date     Hyperlipidemia LDL goal < 100     on Choles Off original     Hypertension goal BP (blood pressure) < 140/90      Other premature beats      Paroxysmal atrial fibrillation (H) 3/4/14     Paroxysmal supraventricular tachycardia (H) 3/4/14     Syncope      Tinnitus of left ear        PAST SURGICAL HISTORY:  Past Surgical History:   Procedure Laterality Date     COLONOSCOPY N/A 2/5/2020    Procedure: COLONOSCOPY, WITH POLYPECTOMY AND BIOPSY;  Surgeon: Kelsea Cabezas MD;  Location: [facility]     D & C  1958    miscarriage      TONSILLECTOMY & ADENOIDECTOMY  1948       FAMILY HISTORY:  Family History   Problem Relation Age of Onset     Cancer - colorectal Mother      Heart Disease Father      Alzheimer Disease Brother      Neurologic Disorder Brother      Heart Disease Sister      Neurologic Disorder Sister        SOCIAL HISTORY:  Social History     Socioeconomic History     Marital status:      Spouse name: None     Number of children: None     Years of education: None     Highest education level: None   Occupational History     None   Social Needs     Financial resource strain: None     Food insecurity     Worry: None     Inability: None     Transportation needs     Medical: None     Non-medical: None   Tobacco Use     Smoking status: Never Smoker     Smokeless tobacco: Never Used   Substance and Sexual Activity     Alcohol use: Yes     Alcohol/week: 0.0 standard drinks     Comment: 1 at [place]     Drug use: No     Sexual activity: Yes     Partners: Male   Lifestyle     Physical activity     Days per week: None     Minutes per session: None     Stress: None   Relationships     Social connections     Talks on phone: None     Gets together: None     Attends [employer]ism service: None     Active member of club or organization: None     Attends meetings of clubs or organizations: None     Relationship status: None     Intimate partner violence     Fear of current or ex partner: None     Emotionally abused: None     Physically abused: None     Forced sexual activity: None   Other Topics Concern     Parent/sibling w/ CABG, MI or angioplasty before 65F 55M? Not Asked   Social History Narrative     None       Review of Systems:  Skin:  Negative     Eyes:  Negative    ENT:  Negative    Respiratory:  Negative    Cardiovascular:  Negative;palpitations;chest pain;dizziness;lightheadedness;syncope or near-syncope;cyanosis;exercise intolerance;fatigue    Gastroenterology: Negative    Genitourinary:  Negative    Musculoskeletal:  Negative   "  Neurologic:  Negative    Psychiatric:  Positive for sleep disturbances  Heme/Lymph/Imm:  Negative    Endocrine:  Negative      Physical Exam:  Vitals: /80   Pulse 96   Ht 1.626 m (5' 4\")   Wt 68.1 kg (150 lb 3.2 oz)   LMP  (LMP Unknown)   BMI 25.78 kg/m      Constitutional:  cooperative, alert and oriented, well developed, well nourished, in no acute distress thin      Skin:  warm and dry to the touch, no apparent skin lesions or masses noted        Head:  normocephalic, no masses or lesions        Eyes:  pupils equal and round        ENT:  no pallor or cyanosis        Neck:  JVP normal        Chest:  clear to auscultation        Cardiac: regular rhythm                  Abdomen:  abdomen soft        Vascular: pulses full and equal     right radial artery;2+             left radial artery;2+                  Extremities and Back:  no edema        Neurological:  no gross motor deficits          Recent Lab Results:  LIPID RESULTS:  Lab Results   Component Value Date    CHOL 137 07/20/2020    HDL 69 07/20/2020    LDL 55 07/20/2020    TRIG 67 07/20/2020    CHOLHDLRATIO 3.2 04/13/2015       LIVER ENZYME RESULTS:  Lab Results   Component Value Date    AST 56 (H) 03/04/2014    ALT 33 11/19/2015       CBC RESULTS:  Lab Results   Component Value Date    WBC 4.4 04/03/2019    RBC 4.65 04/03/2019    HGB 14.4 04/03/2019    HCT 43.8 04/03/2019    MCV 94 04/03/2019    MCH 31.0 04/03/2019    MCHC 32.9 04/03/2019    RDW 13.0 04/03/2019     04/03/2019       BMP RESULTS:  Lab Results   Component Value Date     07/20/2020    POTASSIUM 3.9 07/20/2020    CHLORIDE 108 07/20/2020    CO2 27 07/20/2020    ANIONGAP 6 07/20/2020    GLC 97 07/20/2020    BUN 11 07/20/2020    CR 0.59 07/20/2020    GFRESTIMATED 83 07/20/2020    GFRESTBLACK >90 07/20/2020    NITESH 8.7 07/20/2020        CC  No referring provider defined for this encounter.                    Thank you for allowing me to participate in the care of your " patient.      Sincerely,     JACKI Yung [facility]    cc:   No referring provider defined for this encounter.

## 2020-12-16 ENCOUNTER — TELEPHONE (OUTPATIENT)
Dept: CARDIOLOGY | Facility: CLINIC | Age: 85
End: 2020-12-16

## 2020-12-16 NOTE — TELEPHONE ENCOUNTER
12/16/20 Pt called asking if it is ok for her to take Tylenol PM occasionally to help her sleep. Per [employer], there is no interaction with pts current meds. Informed her to assess for excessive grogginess the next day or dizziness during the night if she needs to get up to use BR, etc. If that is the case, I recommended cutting the pill in 1/2 . She voiced understanding and agreement w plan.  Caitlyn 352 pm

## 2021-06-08 PROBLEM — M53.3 PAIN IN THE COCCYX: Status: RESOLVED | Noted: 2017-05-09 | Resolved: 2021-06-08

## 2021-06-08 SDOH — HEALTH STABILITY: MENTAL HEALTH: HOW OFTEN DO YOU HAVE A DRINK CONTAINING ALCOHOL?: MONTHLY OR LESS

## 2021-06-08 SDOH — HEALTH STABILITY: MENTAL HEALTH: HOW MANY STANDARD DRINKS CONTAINING ALCOHOL DO YOU HAVE ON A TYPICAL DAY?: 1 OR 2

## 2021-06-08 SDOH — HEALTH STABILITY: MENTAL HEALTH: HOW OFTEN DO YOU HAVE 6 OR MORE DRINKS ON ONE OCCASION?: NEVER

## 2021-06-09 ENCOUNTER — OFFICE VISIT (OUTPATIENT)
Dept: INTERNAL MEDICINE | Facility: CLINIC | Age: 86
End: 2021-06-09
Payer: MEDICARE

## 2021-06-09 VITALS
HEART RATE: 90 BPM | RESPIRATION RATE: 18 BRPM | SYSTOLIC BLOOD PRESSURE: 118 MMHG | HEIGHT: 64 IN | DIASTOLIC BLOOD PRESSURE: 60 MMHG | WEIGHT: 150 LBS | OXYGEN SATURATION: 96 % | TEMPERATURE: 98.4 F | BODY MASS INDEX: 25.61 KG/M2

## 2021-06-09 DIAGNOSIS — Z78.0 ASYMPTOMATIC MENOPAUSE: ICD-10-CM

## 2021-06-09 DIAGNOSIS — I48.0 PAF (PAROXYSMAL ATRIAL FIBRILLATION) (H): ICD-10-CM

## 2021-06-09 DIAGNOSIS — I10 BENIGN ESSENTIAL HYPERTENSION: ICD-10-CM

## 2021-06-09 DIAGNOSIS — Z00.00 MEDICARE ANNUAL WELLNESS VISIT, SUBSEQUENT: Primary | ICD-10-CM

## 2021-06-09 DIAGNOSIS — E55.9 VITAMIN D DEFICIENCY: ICD-10-CM

## 2021-06-09 DIAGNOSIS — Z13.0 SCREENING FOR BLOOD DISEASE: ICD-10-CM

## 2021-06-09 DIAGNOSIS — Z13.1 SCREENING FOR DIABETES MELLITUS: ICD-10-CM

## 2021-06-09 DIAGNOSIS — E78.00 PURE HYPERCHOLESTEROLEMIA: ICD-10-CM

## 2021-06-09 PROCEDURE — G0439 PPPS, SUBSEQ VISIT: HCPCS | Performed by: INTERNAL MEDICINE

## 2021-06-09 RX ORDER — LISINOPRIL 20 MG/1
20 TABLET ORAL DAILY
Qty: 90 TABLET | Refills: 3 | Status: SHIPPED | OUTPATIENT
Start: 2021-06-09 | End: 2022-01-11

## 2021-06-09 RX ORDER — ATORVASTATIN CALCIUM 20 MG/1
20 TABLET, FILM COATED ORAL DAILY
Qty: 90 TABLET | Refills: 3 | Status: SHIPPED | OUTPATIENT
Start: 2021-06-09 | End: 2022-01-11

## 2021-06-09 SDOH — ECONOMIC STABILITY: FOOD INSECURITY: WITHIN THE PAST 12 MONTHS, YOU WORRIED THAT YOUR FOOD WOULD RUN OUT BEFORE YOU GOT MONEY TO BUY MORE.: NOT ASKED

## 2021-06-09 SDOH — HEALTH STABILITY: MENTAL HEALTH: HOW OFTEN DO YOU HAVE 6 OR MORE DRINKS ON ONE OCCASION?: NOT ASKED

## 2021-06-09 SDOH — ECONOMIC STABILITY: TRANSPORTATION INSECURITY
IN THE PAST 12 MONTHS, HAS THE LACK OF TRANSPORTATION KEPT YOU FROM MEDICAL APPOINTMENTS OR FROM GETTING MEDICATIONS?: NOT ASKED

## 2021-06-09 SDOH — ECONOMIC STABILITY: TRANSPORTATION INSECURITY
IN THE PAST 12 MONTHS, HAS LACK OF TRANSPORTATION KEPT YOU FROM MEETINGS, WORK, OR FROM GETTING THINGS NEEDED FOR DAILY LIVING?: NOT ASKED

## 2021-06-09 SDOH — HEALTH STABILITY: MENTAL HEALTH: HOW OFTEN DO YOU HAVE A DRINK CONTAINING ALCOHOL?: NOT ASKED

## 2021-06-09 SDOH — ECONOMIC STABILITY: FOOD INSECURITY: WITHIN THE PAST 12 MONTHS, THE FOOD YOU BOUGHT JUST DIDN'T LAST AND YOU DIDN'T HAVE MONEY TO GET MORE.: NOT ASKED

## 2021-06-09 SDOH — HEALTH STABILITY: MENTAL HEALTH: HOW MANY STANDARD DRINKS CONTAINING ALCOHOL DO YOU HAVE ON A TYPICAL DAY?: NOT ASKED

## 2021-06-09 SDOH — ECONOMIC STABILITY: INCOME INSECURITY: HOW HARD IS IT FOR YOU TO PAY FOR THE VERY BASICS LIKE FOOD, HOUSING, MEDICAL CARE, AND HEATING?: NOT ASKED

## 2021-06-09 ASSESSMENT — MIFFLIN-ST. JEOR: SCORE: 1105.4

## 2021-06-09 NOTE — PROGRESS NOTES
ASSESSMENT/PLAN                                                       (Z00.00) Medicare annual wellness visit, subsequent  (primary encounter diagnosis)  Comment: PMH, PSH, FH, SH, medications, allergies, immunizations, and preventative health measures reviewed and updated as appropriate.  Plan: see below for plans.      (E78.00) Pure hypercholesterolemia  (Z13.1) Screening for diabetes mellitus  (E55.9) Vitamin D deficiency  (Z13.0) Screening for blood disease  Plan: fasting labs ordered - patient to schedule; for now, continue present management; refills provided.     (Z78.0) Asymptomatic menopause  Plan: DEXA ordered - patient to schedule.     (I48.0) PAF (paroxysmal atrial fibrillation) (H)  Comment: rate controlled without medication; AC on Xarelto; followed by cardiology.     (I10) Benign essential hypertension  Comment: well-controlled on current regimen.    Plan: continue present management; refills provided.     Appropriate preventive services were discussed with this patient, including applicable screening as appropriate for cardiovascular disease, diabetes, osteopenia/osteoporosis, and glaucoma.  As appropriate for age/gender, discussed screening for colorectal cancer, prostate cancer, breast cancer, and cervical cancer. Checklist reviewing preventive services available has been given to the patient.    Reviewed patients plan of care. The Basic Care Plan (routine screening as documented in Health Maintenance) for Aileen Raygoza meets the Care Plan requirement. This Care Plan has been established and reviewed with the Patient.    Katie Mcdaniel MD   [address] [address]  T: [phone], F: [phone]    SUBJECTIVE                                                      Aileen Raygoza is a very pleasant 86 year old female who presents for her subsequent AWV:    Current providers (other than myself): Yodit (orthopedic surgery)    PMH, PSH, FH, SH,  medications, allergies, immunizations, preventative health, and health risk assessment reviewed and updated as appropriate.    Past Medical History:   Diagnosis Date     Benign essential hypertension      PAF (paroxysmal atrial fibrillation) (H)      Pure hypercholesterolemia      Past Surgical History:   Procedure Laterality Date     COLONOSCOPY N/A 02/05/2020    Procedure: COLONOSCOPY, WITH POLYPECTOMY AND BIOPSY;  Surgeon: Kelsea Cabezas MD;  Location:  GI     D & C      SAB     TONSILLECTOMY & ADENOIDECTOMY       Family History   Problem Relation Age of Onset     Colon Cancer Mother      Impaired Fasting Glucose Mother      Alzheimer Disease Brother      Coronary Artery Disease Brother         s/p PCI later in life     Diabetes No family hx of      Cerebrovascular Disease No family hx of      Myocardial Infarction No family hx of      Coronary Artery Disease Early Onset No family hx of      Breast Cancer No family hx of      Ovarian Cancer No family hx of      Social History     Occupational History     Occupation: Retired - [employer]   Tobacco Use     Smoking status: Never Smoker     Smokeless tobacco: Never Used   Substance and Sexual Activity     Alcohol use: Not Currently     Drug use: No   Social History Narrative    .    4 adult kids.    8 grandchildren.    Lives in a Co-op. Fully independent.    Not able to exercise because of knee pain.      Allergies   Allergen Reactions     Simvastatin Muscle Pain (Myalgia)     Current Outpatient Medications   Medication Sig     atorvastatin (LIPITOR) 20 MG tablet Take 1 tablet (20 mg) by mouth daily     Calcium Carbonate-Vitamin D (CALCIUM + D PO) Take 1 tablet by mouth daily     Cholecalciferol (VITAMIN D PO)      lisinopril (ZESTRIL) 20 MG tablet Take 1 tablet (20 mg) by mouth daily     multivitamin (OCUVITE) TABS Take 1 tablet by mouth 2 times daily     rivaroxaban ANTICOAGULANT (XARELTO) 20 MG TABS tablet Take 1 tablet (20 mg) by mouth  "daily (with dinner)     Immunization History   Administered Date(s) Administered     COVID-19,PF,Pfizer 02/26/2021, 03/19/2021     Influenza (High Dose) 3 valent vaccine 10/10/2014, 09/19/2016, 09/26/2017, 10/17/2018     Influenza Vaccine IM > 6 months Valent IIV4 10/15/2015     Influenza, Quad, High Dose, Pf, 65yr + 10/10/2020     Pneumo Conj 13-V (2010&after) 11/19/2015     Pneumococcal 23 valent 09/11/2013     Tdap (Adacel,Boostrix) 01/01/2010     Zoster vaccine recombinant adjuvanted (SHINGRIX) 10/10/2020, 12/11/2020     Zoster vaccine, live 08/07/2009     PREVENTATIVE HEALTH                                                      BMI: within normal limits   Blood pressure: well-controlled on current regimen   Breast CA screening: screening no longer indicated  Colon CA screening: screening no longer indicated  Lung CA screening: n/a   Dexa: DUE  Screening cholesterol: n/a - already being treated for this condition  Screening diabetes: DUE  Alcohol misuse screening: alcohol use reviewed - no intervention indicated at this time  Immunizations: reviewed; tetanus booster DUE - not covered by Medicare (may obtain in pharmacy if desired)     HEALTH RISK ASSESSMENT                                                      In general, how would you rate your overall physical health? excellent  Outside of work, how many days during the week do you exercise? 1 day/week  Outside of work, approximately how many minutes a day do you exercise? less than 15 minutes    If you drink alcohol do you typically have >3 drinks per day or >7 drinks per week? No  Do you usually eat at least 4 servings of fruit and vegetables a day, include whole grains & fiber and avoid regularly eating high fat or \"junk\" foods? Yes     Do you have any problems taking medications regularly? No  Do you have any side effects from medications? No    Assistance with daily activities: No    Safety concerns: No    Fall risk assessment: completed today (see " "ambulatory assessments)    Hearing concerns: No    In the past 6 months, have you been bothered by leaking of urine: No    In general, how would you rate your overall mental or emotional health: excellent    PHQ-2/PHQ-9 assessment: completed today (see ambulatory assessments)    Additional concerns today: No    OBJECTIVE                                                      /60 (BP Location: Left arm, Patient Position: Chair, Cuff Size: Adult Regular)   Pulse 90   Temp 98.4  F (36.9  C) (Temporal)   Resp 18   Ht 1.626 m (5' 4\")   Wt 68 kg (150 lb)   LMP  (LMP Unknown)   SpO2 96%   BMI 25.75 kg/m    Constitutional: well-appearing  Head, Ears, and Eyes: normocephalic; normal external auditory canal and pinna; tympanic membranes visualized and normal; normal lids and conjunctivae  Neck: supple, symmetric, no thyromegaly or lymphadenopathy  Respiratory: normal respiratory effort; clear to auscultation bilaterally  Cardiovascular: regular rate and rhythm; no edema  Gastrointestinal: soft, non-tender, and non-distended; no organomegaly or masses  Musculoskeletal: normal gait and station  Psych: normal judgment and insight; normal mood and affect; recent and remote memory intact    Cognitive impairment noted: No  ---  (Note was completed, in part, with [employer] voice-recognition software. Documentation was reviewed, but some grammatical, spelling, and word errors may remain.)  "

## 2021-06-21 ENCOUNTER — ANCILLARY PROCEDURE (OUTPATIENT)
Dept: BONE DENSITY | Facility: CLINIC | Age: 86
End: 2021-06-21
Attending: INTERNAL MEDICINE
Payer: MEDICARE

## 2021-06-21 DIAGNOSIS — Z78.0 ASYMPTOMATIC MENOPAUSE: ICD-10-CM

## 2021-06-21 PROCEDURE — 77085 DXA BONE DENSITY AXL VRT FX: CPT | Performed by: INTERNAL MEDICINE

## 2021-08-03 ENCOUNTER — TELEPHONE (OUTPATIENT)
Dept: INTERNAL MEDICINE | Facility: CLINIC | Age: 86
End: 2021-08-03

## 2021-08-03 NOTE — TELEPHONE ENCOUNTER
Dr. Mcdaniel/covering provider    Patient has a tooth extraction scheduled on August 17th, patients needs to know how many days if any does she need to hold her Xarelto. Patient current dose: Xarelto 20mg PO every day.     Can we leave a detailed message on this number? YES  Phone number patient can be reached at: Other phone number:  [phone]    Brittany Gutierrez RN  [facility] T[facility]

## 2021-08-04 ENCOUNTER — TELEPHONE (OUTPATIENT)
Dept: CARDIOLOGY | Facility: CLINIC | Age: 86
End: 2021-08-04

## 2021-08-04 NOTE — TELEPHONE ENCOUNTER
Patient should HOLD Xarelto the day before surgery only (only one day is needed prior to low/moderate risk of bleeding procedures)

## 2021-08-04 NOTE — TELEPHONE ENCOUNTER
Called patient, relayed information below. Patient provided teach back understanding.     Gustabo Armenta RN  [facility]

## 2021-08-04 NOTE — TELEPHONE ENCOUNTER
Pt called and LM that she is needing tooth extracted and was told to find out how long she needed to hold her Xarelto for.  Pt stated that if she was not home to leave a message.  LM for that per her record she has NOT had a stroke, clot or Valve replacement and can hold her Xarelto for up to 3 days and then to restart as soon as is safe per provider doing procedure.  Pt told to call back with any questions. Itzel

## 2021-10-05 ENCOUNTER — TRANSFERRED RECORDS (OUTPATIENT)
Dept: HEALTH INFORMATION MANAGEMENT | Facility: CLINIC | Age: 86
End: 2021-10-05

## 2021-10-08 DIAGNOSIS — I10 BENIGN ESSENTIAL HYPERTENSION: ICD-10-CM

## 2021-10-08 DIAGNOSIS — E78.00 PURE HYPERCHOLESTEROLEMIA: ICD-10-CM

## 2021-10-08 RX ORDER — ATORVASTATIN CALCIUM 20 MG/1
20 TABLET, FILM COATED ORAL DAILY
Qty: 90 TABLET | Refills: 3 | OUTPATIENT
Start: 2021-10-08

## 2021-10-08 RX ORDER — LISINOPRIL 20 MG/1
20 TABLET ORAL DAILY
Qty: 90 TABLET | Refills: 3 | OUTPATIENT
Start: 2021-10-08

## 2021-10-08 NOTE — LETTER
[facility]  [address] [address]  [phone]  October 15, 2021      Aileen Raygoza  [address]  [facility] [address]      Dear Aileen Raygoza    In reviewing your recent refill request for your medications, it was noted that you are due for the following laboratory test fasting.. Approval for a 30 day supply of the medication has been sent to your pharmacy. Additional refills will be approved after completion of the lab test.    Please contact our office at [phone] to schedule your appointment.      Sincerely,      [facility]

## 2021-10-08 NOTE — TELEPHONE ENCOUNTER
Routing refill request to provider for review/approval because:  Labs not current:    Creatinine   Date Value Ref Range Status   07/20/2020 0.59 0.52 - 1.04 mg/dL Final     Potassium   Date Value Ref Range Status   07/20/2020 3.9 3.4 - 5.3 mmol/L Final     LDL Cholesterol Calculated   Date Value Ref Range Status   07/20/2020 55 <100 mg/dL Final     Comment:     Desirable:       <100 mg/dl       Mary Kay Rolle RN

## 2021-10-12 DIAGNOSIS — I10 BENIGN ESSENTIAL HYPERTENSION: ICD-10-CM

## 2021-10-13 RX ORDER — LISINOPRIL 20 MG/1
20 TABLET ORAL DAILY
Qty: 90 TABLET | Refills: 2 | OUTPATIENT
Start: 2021-10-13

## 2021-10-13 NOTE — TELEPHONE ENCOUNTER
Routing refill request to provider for review/approval because:  Labs not current:    Lab Test 07/20/20  0932   CR 0.59     Last office vist: 6/9/2021    Pended 90 day supply & 2 refills. Please Review.    Next office visit: none scheduled    Sylvia Centeno RN  [facility]

## 2021-11-29 ENCOUNTER — OFFICE VISIT (OUTPATIENT)
Dept: CARDIOLOGY | Facility: CLINIC | Age: 86
End: 2021-11-29
Payer: MEDICARE

## 2021-11-29 VITALS
WEIGHT: 156.2 LBS | DIASTOLIC BLOOD PRESSURE: 80 MMHG | HEIGHT: 63 IN | BODY MASS INDEX: 27.68 KG/M2 | HEART RATE: 96 BPM | SYSTOLIC BLOOD PRESSURE: 130 MMHG

## 2021-11-29 DIAGNOSIS — Z79.01 ON CONTINUOUS ORAL ANTICOAGULATION: ICD-10-CM

## 2021-11-29 DIAGNOSIS — I10 BENIGN ESSENTIAL HYPERTENSION: ICD-10-CM

## 2021-11-29 DIAGNOSIS — I48.21 PERMANENT ATRIAL FIBRILLATION (H): Primary | ICD-10-CM

## 2021-11-29 PROCEDURE — 99213 OFFICE O/P EST LOW 20 MIN: CPT | Performed by: NURSE PRACTITIONER

## 2021-11-29 ASSESSMENT — MIFFLIN-ST. JEOR: SCORE: 1109.71

## 2021-11-29 NOTE — PATIENT INSTRUCTIONS
Call [facility] to make an appointment for labs.  The orders are in.  We will call you with results.     Follow up with Dr. Varela in 1 year.    If you have problems or questions please call the RNs (Mona Andrade & Ines) at [phone]

## 2021-11-29 NOTE — PROGRESS NOTES
Cardiology Clinic Progress Note    Service Date: 11/29/21    Primary Cardiologist: Dr. Devlin      Reason for Visit: annual      HPI:   I had the pleasure of seeing Ms. Aileen Raygoza in the clinic today and she is a very pleasant 86 year old female with a past medical history notable for    1. Chronic atrial fibrillation.   2. PVCs  3. HTN    Today, she reports no concerns and denies chest pain, shortness of breath, dyspnea on exertion, orthopnea, PND, lower extremity edema, palpitations, dizziness, presyncope, or syncope.   Reports taking medications as prescribed including Xarelto and denies bleeding and sign/symptoms of stroke.     ASSESSMENT AND PLAN:    permanent Atrial fibrillation    Pt is asymptomatic while in atrial fibrillation.    For rate control she is not taking any oral anticoagulation     For anticoagulation for CHADS VASC 4 (age++, female, HTN) she is currently taking Xarelto 20 mg daily.    Will repeat CBC today.     We discussed placing a monitor prior to seeing Dr. Devlin however pt feels good and prefers no extra driving.  We discussed calling if symptoms change.      Hypertension    controlled while taking lisinopril.        Plan:    CBC now at [facility].      Continue with current medications    Follow up with Dr. Devlin in 1 year.     Please call the clinic if questions or problems arise      Thank you for the opportunity to participate in this pleasant patient's care. We would be happy to see her sooner if needed for any concerns in the meantime.         JACKI Yung [facility]  Text Page  (M-F 8:00 am - 4:30 pm)    Orders this Visit:  Orders Placed This Encounter   Procedures     CBC with platelets     Orders Placed This Encounter   Medications     Multiple Vitamins-Minerals (PRESERVISION AREDS 2 PO)     rivaroxaban ANTICOAGULANT (XARELTO) 20 MG TABS tablet     Sig: Take 1 tablet (20 mg) by mouth daily (with dinner)     Dispense:  90 tablet     Refill:  3     Medications  "Discontinued During This Encounter   Medication Reason     rivaroxaban ANTICOAGULANT (XARELTO) 20 MG TABS tablet Reorder     Encounter Diagnoses   Name Primary?     PAF (paroxysmal atrial fibrillation) (H) Yes     On continuous oral anticoagulation      Paroxysmal atrial fibrillation (H)        CURRENT MEDICATIONS:  Current Outpatient Medications   Medication Sig Dispense Refill     atorvastatin (LIPITOR) 20 MG tablet Take 1 tablet (20 mg) by mouth daily 90 tablet 3     Calcium Carbonate-Vitamin D (CALCIUM + D PO) Take 1 tablet by mouth daily       Cholecalciferol (VITAMIN D PO)        lisinopril (ZESTRIL) 20 MG tablet Take 1 tablet (20 mg) by mouth daily 90 tablet 3     Multiple Vitamins-Minerals (PRESERVISION AREDS 2 PO)        multivitamin (OCUVITE) TABS Take 1 tablet by mouth 2 times daily       rivaroxaban ANTICOAGULANT (XARELTO) 20 MG TABS tablet Take 1 tablet (20 mg) by mouth daily (with dinner) 90 tablet 3       ALLERGIES  Allergies   Allergen Reactions     Simvastatin Muscle Pain (Myalgia)       PAST MEDICAL, SURGICAL, SOCIAL FAMILY HISTORY:  History was reviewed and updated as needed, see medical record.    Review of Systems:  Skin:  Negative     Eyes:  Positive for glasses  ENT:  Negative    Respiratory:  Negative    Cardiovascular:  chest pain;Negative;palpitations;edema;lightheadedness;dizziness    Gastroenterology: Positive for heartburn  Genitourinary:  Negative    Musculoskeletal:  Negative    Neurologic:  Negative headaches  Psychiatric:  Negative    Heme/Lymph/Imm:  Positive for allergies  Endocrine:  Negative thyroid disorder;diabetes     Physical Exam:  Vitals: /80   Pulse 96   Ht 1.588 m (5' 2.5\")   Wt 70.9 kg (156 lb 3.2 oz)   LMP  (LMP Unknown)   BMI 28.11 kg/m     Wt Readings from Last 4 Encounters:   11/29/21 70.9 kg (156 lb 3.2 oz)   06/09/21 68 kg (150 lb)   11/24/20 68.1 kg (150 lb 3.2 oz)   02/05/20 68.5 kg (151 lb)     CONSTITUTIONAL: Appears her stated age, well nourished, " and in no acute distress.  HEENT: Pupils equal, round. Sclerae nonicteric.    NECK: Supple, no masses appreciated.   C/V:  Irregular irregular  RESP: Respirations are unlabored. Lungs are clear to auscultation bilaterally without wheezing, rales, or rhonchi.  GI: Abdomen soft, non-tender, non-distended.  EXTREM:  No clubbing, cyanosis, or lower extremity edema bilaterally.   NEURO: Alert and oriented, cooperative. Gait steady. No gross focal deficits.   PSYCH: Affect appropriate. Mentation normal. Responds to questions appropriately.  SKIN: Warm and dry. No apparent rashes or bruising.     Recent Lab Results:  CBC RESULTS:  Lab Results   Component Value Date    WBC 4.4 04/03/2019    RBC 4.65 04/03/2019    HGB 14.4 04/03/2019    HCT 43.8 04/03/2019    MCV 94 04/03/2019    MCH 31.0 04/03/2019    MCHC 32.9 04/03/2019    RDW 13.0 04/03/2019     04/03/2019     BMP RESULTS:  Lab Results   Component Value Date     07/20/2020    POTASSIUM 3.9 07/20/2020    CHLORIDE 108 07/20/2020    CO2 27 07/20/2020    ANIONGAP 6 07/20/2020    GLC 97 07/20/2020    BUN 11 07/20/2020    CR 0.59 07/20/2020    GFRESTIMATED 83 07/20/2020    GFRESTBLACK >90 07/20/2020    NITESH 8.7 07/20/2020        CC  No referring provider defined for this encounter.    This note was completed in part using Dragon voice recognition software. Although reviewed after completion, some word and grammatical errors may occur.

## 2021-11-29 NOTE — LETTER
11/29/2021    Katie Mcdaniel MD  [address] [facility] [address]    RE: Aileen Raygoza       Dear Colleague,    I had the pleasure of seeing Aileen Raygoza in the [facility].      Cardiology Clinic Progress Note    Service Date: 11/29/21    Primary Cardiologist: Dr. Devlin      Reason for Visit: annual      HPI:   I had the pleasure of seeing Ms. Aileen Raygoza in the clinic today and she is a very pleasant 86 year old female with a past medical history notable for    1. Chronic atrial fibrillation.   2. PVCs  3. HTN    Today, she reports no concerns and denies chest pain, shortness of breath, dyspnea on exertion, orthopnea, PND, lower extremity edema, palpitations, dizziness, presyncope, or syncope.   Reports taking medications as prescribed including Xarelto and denies bleeding and sign/symptoms of stroke.     ASSESSMENT AND PLAN:    permanent Atrial fibrillation    Pt is asymptomatic while in atrial fibrillation.    For rate control she is not taking any oral anticoagulation     For anticoagulation for CHADS VASC 4 (age++, female, HTN) she is currently taking Xarelto 20 mg daily.    Will repeat CBC today.     We discussed placing a monitor prior to seeing Dr. Devlin however pt feels good and prefers no extra driving.  We discussed calling if symptoms change.      Hypertension    controlled while taking lisinopril.        Plan:    CBC now at [facility].      Continue with current medications    Follow up with Dr. Devlin in 1 year.     Please call the clinic if questions or problems arise    Thank you for the opportunity to participate in this pleasant patient's care. We would be happy to see her sooner if needed for any concerns in the meantime.       JACKI Yung CNP  [facility]  Text Page  (M-F 8:00 am - 4:30 pm)    Orders this Visit:  Orders Placed This Encounter   Procedures     CBC with platelets     Orders Placed This Encounter  "  Medications     Multiple Vitamins-Minerals (PRESERVISION AREDS 2 PO)     rivaroxaban ANTICOAGULANT (XARELTO) 20 MG TABS tablet     Sig: Take 1 tablet (20 mg) by mouth daily (with dinner)     Dispense:  90 tablet     Refill:  3     Medications Discontinued During This Encounter   Medication Reason     rivaroxaban ANTICOAGULANT (XARELTO) 20 MG TABS tablet Reorder     Encounter Diagnoses   Name Primary?     PAF (paroxysmal atrial fibrillation) (H) Yes     On continuous oral anticoagulation      Paroxysmal atrial fibrillation (H)        CURRENT MEDICATIONS:  Current Outpatient Medications   Medication Sig Dispense Refill     atorvastatin (LIPITOR) 20 MG tablet Take 1 tablet (20 mg) by mouth daily 90 tablet 3     Calcium Carbonate-Vitamin D (CALCIUM + D PO) Take 1 tablet by mouth daily       Cholecalciferol (VITAMIN D PO)        lisinopril (ZESTRIL) 20 MG tablet Take 1 tablet (20 mg) by mouth daily 90 tablet 3     Multiple Vitamins-Minerals (PRESERVISION AREDS 2 PO)        multivitamin (OCUVITE) TABS Take 1 tablet by mouth 2 times daily       rivaroxaban ANTICOAGULANT (XARELTO) 20 MG TABS tablet Take 1 tablet (20 mg) by mouth daily (with dinner) 90 tablet 3       ALLERGIES  Allergies   Allergen Reactions     Simvastatin Muscle Pain (Myalgia)       PAST MEDICAL, SURGICAL, SOCIAL FAMILY HISTORY:  History was reviewed and updated as needed, see medical record.    Review of Systems:  Skin:  Negative     Eyes:  Positive for glasses  ENT:  Negative    Respiratory:  Negative    Cardiovascular:  chest pain;Negative;palpitations;edema;lightheadedness;dizziness    Gastroenterology: Positive for heartburn  Genitourinary:  Negative    Musculoskeletal:  Negative    Neurologic:  Negative headaches  Psychiatric:  Negative    Heme/Lymph/Imm:  Positive for allergies  Endocrine:  Negative thyroid disorder;diabetes     Physical Exam:  Vitals: /80   Pulse 96   Ht 1.588 m (5' 2.5\")   Wt 70.9 kg (156 lb 3.2 oz)   LMP  (LMP " Unknown)   BMI 28.11 kg/m     Wt Readings from Last 4 Encounters:   11/29/21 70.9 kg (156 lb 3.2 oz)   06/09/21 68 kg (150 lb)   11/24/20 68.1 kg (150 lb 3.2 oz)   02/05/20 68.5 kg (151 lb)     CONSTITUTIONAL: Appears her stated age, well nourished, and in no acute distress.  HEENT: Pupils equal, round. Sclerae nonicteric.    NECK: Supple, no masses appreciated.   C/V:  Irregular irregular  RESP: Respirations are unlabored. Lungs are clear to auscultation bilaterally without wheezing, rales, or rhonchi.  GI: Abdomen soft, non-tender, non-distended.  EXTREM:  No clubbing, cyanosis, or lower extremity edema bilaterally.   NEURO: Alert and oriented, cooperative. Gait steady. No gross focal deficits.   PSYCH: Affect appropriate. Mentation normal. Responds to questions appropriately.  SKIN: Warm and dry. No apparent rashes or bruising.     Recent Lab Results:  CBC RESULTS:  Lab Results   Component Value Date    WBC 4.4 04/03/2019    RBC 4.65 04/03/2019    HGB 14.4 04/03/2019    HCT 43.8 04/03/2019    MCV 94 04/03/2019    MCH 31.0 04/03/2019    MCHC 32.9 04/03/2019    RDW 13.0 04/03/2019     04/03/2019     BMP RESULTS:  Lab Results   Component Value Date     07/20/2020    POTASSIUM 3.9 07/20/2020    CHLORIDE 108 07/20/2020    CO2 27 07/20/2020    ANIONGAP 6 07/20/2020    GLC 97 07/20/2020    BUN 11 07/20/2020    CR 0.59 07/20/2020    GFRESTIMATED 83 07/20/2020    GFRESTBLACK >90 07/20/2020    NITESH 8.7 07/20/2020     This note was completed in part using Dragon voice recognition software. Although reviewed after completion, some word and grammatical errors may occur.    Elaina Betancur, JACKI [facility]

## 2021-12-07 ENCOUNTER — LAB (OUTPATIENT)
Dept: LAB | Facility: CLINIC | Age: 86
End: 2021-12-07
Payer: MEDICARE

## 2021-12-07 DIAGNOSIS — Z13.1 SCREENING FOR DIABETES MELLITUS: ICD-10-CM

## 2021-12-07 DIAGNOSIS — Z13.0 SCREENING FOR BLOOD DISEASE: ICD-10-CM

## 2021-12-07 DIAGNOSIS — E55.9 VITAMIN D DEFICIENCY: ICD-10-CM

## 2021-12-07 DIAGNOSIS — E78.00 PURE HYPERCHOLESTEROLEMIA: ICD-10-CM

## 2021-12-07 DIAGNOSIS — Z79.01 ON CONTINUOUS ORAL ANTICOAGULATION: ICD-10-CM

## 2021-12-07 LAB
DEPRECATED CALCIDIOL+CALCIFEROL SERPL-MC: 53 UG/L (ref 20–75)
ERYTHROCYTE [DISTWIDTH] IN BLOOD BY AUTOMATED COUNT: 13.2 % (ref 10–15)
HCT VFR BLD AUTO: 43.1 % (ref 35–47)
HGB BLD-MCNC: 13.4 G/DL (ref 11.7–15.7)
MCH RBC QN AUTO: 30.1 PG (ref 26.5–33)
MCHC RBC AUTO-ENTMCNC: 31.1 G/DL (ref 31.5–36.5)
MCV RBC AUTO: 97 FL (ref 78–100)
PLATELET # BLD AUTO: 229 10E3/UL (ref 150–450)
RBC # BLD AUTO: 4.45 10E6/UL (ref 3.8–5.2)
WBC # BLD AUTO: 5.4 10E3/UL (ref 4–11)

## 2021-12-07 PROCEDURE — 80053 COMPREHEN METABOLIC PANEL: CPT

## 2021-12-07 PROCEDURE — 82306 VITAMIN D 25 HYDROXY: CPT

## 2021-12-07 PROCEDURE — 85027 COMPLETE CBC AUTOMATED: CPT

## 2021-12-07 PROCEDURE — 80061 LIPID PANEL: CPT

## 2021-12-07 PROCEDURE — 36415 COLL VENOUS BLD VENIPUNCTURE: CPT

## 2021-12-08 LAB
ALBUMIN SERPL-MCNC: 3.3 G/DL (ref 3.4–5)
ALP SERPL-CCNC: 103 U/L (ref 40–150)
ALT SERPL W P-5'-P-CCNC: 26 U/L (ref 0–50)
ANION GAP SERPL CALCULATED.3IONS-SCNC: 6 MMOL/L (ref 3–14)
AST SERPL W P-5'-P-CCNC: 18 U/L (ref 0–45)
BILIRUB SERPL-MCNC: 0.9 MG/DL (ref 0.2–1.3)
BUN SERPL-MCNC: 17 MG/DL (ref 7–30)
CALCIUM SERPL-MCNC: 8.5 MG/DL (ref 8.5–10.1)
CHLORIDE BLD-SCNC: 104 MMOL/L (ref 94–109)
CO2 SERPL-SCNC: 27 MMOL/L (ref 20–32)
CREAT SERPL-MCNC: 0.58 MG/DL (ref 0.52–1.04)
GFR SERPL CREATININE-BSD FRML MDRD: 84 ML/MIN/1.73M2
GLUCOSE BLD-MCNC: 101 MG/DL (ref 70–99)
POTASSIUM BLD-SCNC: 3.8 MMOL/L (ref 3.4–5.3)
PROT SERPL-MCNC: 6.9 G/DL (ref 6.8–8.8)
SODIUM SERPL-SCNC: 137 MMOL/L (ref 133–144)

## 2021-12-16 ENCOUNTER — TELEPHONE (OUTPATIENT)
Dept: INTERNAL MEDICINE | Facility: CLINIC | Age: 86
End: 2021-12-16
Payer: MEDICARE

## 2021-12-16 LAB
CHOLEST SERPL-MCNC: 152 MG/DL
FASTING STATUS PATIENT QL REPORTED: YES
HDLC SERPL-MCNC: 69 MG/DL
LDLC SERPL CALC-MCNC: 68 MG/DL
NONHDLC SERPL-MCNC: 83 MG/DL
TRIGL SERPL-MCNC: 75 MG/DL

## 2021-12-16 NOTE — TELEPHONE ENCOUNTER
Reason for Call:  Request for results:    Name of test or procedure: lab results    Date of test of procedure: 12/07/21    Location of the test or procedure: [facility]    OK to leave the result message on voice mail or with a family member? YES    Phone number Patient can be reached at:  Home number on file [phone] (home)    Additional comments: pt wants lab results be mailed to her home address. Thanks.    Call taken on 12/16/2021 at 2:42 PM by THOM GALE

## 2021-12-16 NOTE — TELEPHONE ENCOUNTER
Attempted to call patient. Voice mail would not allow message to be recorded.  Mirella Bartlett CMA on 12/16/2021 at 4:12 PM

## 2021-12-17 NOTE — TELEPHONE ENCOUNTER
Attempted to call patient again and no answer and unable to leave voicemail. Printed letter again and mailed to patient

## 2021-12-22 ENCOUNTER — TELEPHONE (OUTPATIENT)
Dept: INTERNAL MEDICINE | Facility: CLINIC | Age: 86
End: 2021-12-22
Payer: MEDICARE

## 2022-01-11 ENCOUNTER — TELEPHONE (OUTPATIENT)
Dept: INTERNAL MEDICINE | Facility: CLINIC | Age: 87
End: 2022-01-11
Payer: MEDICARE

## 2022-01-11 DIAGNOSIS — E78.00 PURE HYPERCHOLESTEROLEMIA: ICD-10-CM

## 2022-01-11 DIAGNOSIS — I10 BENIGN ESSENTIAL HYPERTENSION: ICD-10-CM

## 2022-01-11 RX ORDER — LISINOPRIL 20 MG/1
20 TABLET ORAL DAILY
Qty: 90 TABLET | Refills: 2 | Status: SHIPPED | OUTPATIENT
Start: 2022-01-11 | End: 2022-12-20

## 2022-01-11 RX ORDER — ATORVASTATIN CALCIUM 20 MG/1
20 TABLET, FILM COATED ORAL DAILY
Qty: 90 TABLET | Refills: 2 | Status: SHIPPED | OUTPATIENT
Start: 2022-01-11 | End: 2022-12-20

## 2022-01-11 NOTE — TELEPHONE ENCOUNTER
Pt would like pharmacy change from [facility], to mail order [facility].  Her insurance is [employer]--  [facility] is [facility]'s preferred mail-service pharmacy.    Dr. Mcdaniel manages her lisinopril & atorvastatin,   Cardiology - Dr. Betancur manages her Xarelto.

## 2022-01-13 DIAGNOSIS — Z79.01 ON CONTINUOUS ORAL ANTICOAGULATION: ICD-10-CM

## 2022-01-13 DIAGNOSIS — I48.21 PERMANENT ATRIAL FIBRILLATION (H): ICD-10-CM

## 2022-01-13 DIAGNOSIS — I10 BENIGN ESSENTIAL HYPERTENSION: ICD-10-CM

## 2022-01-13 NOTE — TELEPHONE ENCOUNTER
Pt calling about her medications, she called on 1/11, requesting to have prescriptions sent to different pharmacy (insurance changes).   RX's were transferred to [facility].   Pt spoke to them on 1/11, and was told the cost for all 3 meds was about $600. So she canceled that order.  She then called her previous pharmacy, [facility], but the meds were even more expenseove at [facility] ($1,000).     She is calling now, because she is wanting her meds re-sent to the [facility]. Since the orders from 1/11 were canceled, pharmacy needs new orders sent in.     RX's called in for atorvastatin & lisinopril (for Dr. Mcdaniel).     Routing refill request for Xarelto to cardiology (Elaina Betancur).

## 2022-06-07 ENCOUNTER — TRANSFERRED RECORDS (OUTPATIENT)
Dept: HEALTH INFORMATION MANAGEMENT | Facility: CLINIC | Age: 87
End: 2022-06-07
Payer: MEDICARE

## 2022-10-24 ENCOUNTER — TRANSFERRED RECORDS (OUTPATIENT)
Dept: INTERNAL MEDICINE | Facility: CLINIC | Age: 87
End: 2022-10-24

## 2022-12-05 ENCOUNTER — OFFICE VISIT (OUTPATIENT)
Dept: CARDIOLOGY | Facility: CLINIC | Age: 87
End: 2022-12-05
Payer: MEDICARE

## 2022-12-05 VITALS
DIASTOLIC BLOOD PRESSURE: 70 MMHG | SYSTOLIC BLOOD PRESSURE: 120 MMHG | BODY MASS INDEX: 29.08 KG/M2 | WEIGHT: 158 LBS | HEIGHT: 62 IN | HEART RATE: 90 BPM

## 2022-12-05 DIAGNOSIS — I48.21 PERMANENT ATRIAL FIBRILLATION (H): ICD-10-CM

## 2022-12-05 PROCEDURE — 99213 OFFICE O/P EST LOW 20 MIN: CPT | Performed by: INTERNAL MEDICINE

## 2022-12-05 NOTE — LETTER
"12/5/2022    Katie Mcdaniel MD  [address] [facility] [address]    RE: Aileen Raygoza       Dear Colleague,     I had the pleasure of seeing Aileen Raygoza in the [facility].  Electrophysiology/ Clinic Note         H&P and Plan:     REASON FOR VISIT: Electrophysiology evaluation.      HISTORY OF PRESENT ILLNESS: Patient is a pleasant Ms. Raygoza is a delightful, 87-year-old lady with a history of hypertension and paroxysmal AFib, who is here for routine evaluation.       The patient was initially diagnosed with AFib in 03/2014.  At that time she was admitted in the hospital with diarrhea and was found to have asymptomatic paroxysmal AFib.    She self converted back to normal rhythm.    Today, she informs she is doing well.  She denies any recurrence of A. fib.  She has no complaints during this visit. She denies chest pain, shortness of breath, lightheadedness, near-syncope or syncope.    BMP obtained last year was within normal limits.     PREVIOUS STUDIES (personally reviewed):  -Echo (3/2014): EF of 60 to 65%.      ASSESSMENT AND PLAN:   1.  Paroxysmal AFib.  She continues to be asymptomatic.  We will repeat an echocardiogram, and continue conservative approach.   2.  Embolic prevention.  CHADS-VASc score of 4, on Xarelto.  Continue anticoagulation indefinitely.   3.  Followup care.  Follow up in clinic in a year with REID.     Anastacio Devlin MD    Physical Exam:  Vitals: /70   Pulse 90   Ht 1.575 m (5' 2\")   Wt 71.7 kg (158 lb)   LMP  (LMP Unknown)   BMI 28.90 kg/m      Constitutional:  AAO x3.  Pt is in NAD.  HEAD: normocephalic.  SKIN: Skin normal color, texture and turgor with no lesions or eruptions.  Eyes: PERRL, EOMI.  ENT:  Supple, normal JVP. No lymphadenopathy or thyroid enlargement.  Chest:  CTAB.  Cardiac:  RRR, normal  S1 and S2.  No murmurs rubs or gallop.    Abdomen:  Normal BS.  Soft, non-tender and non-distended.  No rebound or guarding.    Extremities:  " Pedious pulses palpable B/L.  No LE edema noticed.   Neurological: Strength and sensation grossly symmetric and intact throughout.       CURRENT MEDICATIONS:  Current Outpatient Medications   Medication Sig Dispense Refill     atorvastatin (LIPITOR) 20 MG tablet Take 1 tablet (20 mg) by mouth daily 90 tablet 2     Calcium Carbonate-Vitamin D (CALCIUM + D PO) Take 1 tablet by mouth daily       Cholecalciferol (VITAMIN D PO)        lisinopril (ZESTRIL) 20 MG tablet Take 1 tablet (20 mg) by mouth daily 90 tablet 2     Multiple Vitamins-Minerals (PRESERVISION AREDS 2 PO)        multivitamin (OCUVITE) TABS Take 1 tablet by mouth 2 times daily       rivaroxaban ANTICOAGULANT (XARELTO) 20 MG TABS tablet Take 1 tablet (20 mg) by mouth daily (with dinner) 90 tablet 3       ALLERGIES     Allergies   Allergen Reactions     Simvastatin Muscle Pain (Myalgia)       PAST MEDICAL HISTORY:  Past Medical History:   Diagnosis Date     Benign essential hypertension      PAF (paroxysmal atrial fibrillation) (H)      Pure hypercholesterolemia        PAST SURGICAL HISTORY:  Past Surgical History:   Procedure Laterality Date     COLONOSCOPY N/A 02/05/2020    Procedure: COLONOSCOPY, WITH POLYPECTOMY AND BIOPSY;  Surgeon: Kelsea Cabezas MD;  Location:  GI     D & C      SAB     TONSILLECTOMY & ADENOIDECTOMY         FAMILY HISTORY:  The patient's family history was reviewed and is non-contributory for heart disease.    SOCIAL HISTORY:  Social History     Socioeconomic History     Marital status:    Occupational History     Occupation: Retired - [employer]   Tobacco Use     Smoking status: Never     Smokeless tobacco: Never   Substance and Sexual Activity     Alcohol use: Not Currently     Drug use: No     Sexual activity: Not Currently   Social History Narrative    .    4 adult kids.    8 grandchildren.    Lives in a Co-op. Fully independent.    Not able to exercise because of knee pain.        Review of  Systems:  Skin:        Eyes:       ENT:       Respiratory:       Cardiovascular:       Gastroenterology:      Genitourinary:       Musculoskeletal:       Neurologic:       Psychiatric:       Heme/Lymph/Imm:       Endocrine:           Recent Lab Results:  LIPID RESULTS:  Lab Results   Component Value Date    CHOL 152 12/07/2021    CHOL 137 07/20/2020    HDL 69 12/07/2021    HDL 69 07/20/2020    LDL 68 12/07/2021    LDL 55 07/20/2020    TRIG 75 12/07/2021    TRIG 67 07/20/2020    CHOLHDLRATIO 3.2 04/13/2015       LIVER ENZYME RESULTS:  Lab Results   Component Value Date    AST 18 12/07/2021    AST 56 (H) 03/04/2014    ALT 26 12/07/2021    ALT 33 11/19/2015       CBC RESULTS:  Lab Results   Component Value Date    WBC 5.4 12/07/2021    WBC 4.4 04/03/2019    RBC 4.45 12/07/2021    RBC 4.65 04/03/2019    HGB 13.4 12/07/2021    HGB 14.4 04/03/2019    HCT 43.1 12/07/2021    HCT 43.8 04/03/2019    MCV 97 12/07/2021    MCV 94 04/03/2019    MCH 30.1 12/07/2021    MCH 31.0 04/03/2019    MCHC 31.1 (L) 12/07/2021    MCHC 32.9 04/03/2019    RDW 13.2 12/07/2021    RDW 13.0 04/03/2019     12/07/2021     04/03/2019       BMP RESULTS:  Lab Results   Component Value Date     12/07/2021     07/20/2020    POTASSIUM 3.8 12/07/2021    POTASSIUM 3.9 07/20/2020    CHLORIDE 104 12/07/2021    CHLORIDE 108 07/20/2020    CO2 27 12/07/2021    CO2 27 07/20/2020    ANIONGAP 6 12/07/2021    ANIONGAP 6 07/20/2020     (H) 12/07/2021    GLC 97 07/20/2020    BUN 17 12/07/2021    BUN 11 07/20/2020    CR 0.58 12/07/2021    CR 0.59 07/20/2020    GFRESTIMATED 84 12/07/2021    GFRESTIMATED 83 07/20/2020    GFRESTBLACK >90 07/20/2020    NITESH 8.5 12/07/2021    NITESH 8.7 07/20/2020        A1C RESULTS:  No results found for: A1C    INR RESULTS:  No results found for: INR      ECHOCARDIOGRAM  No results found for this or any previous visit (from the past 8760 hour(s)).    No orders of the defined types were placed in this  encounter.    No orders of the defined types were placed in this encounter.    There are no discontinued medications.    Encounter Diagnosis   Name Primary?     Permanent atrial fibrillation (H)        CC  Anastacio Devlin MD  [address]  [place] [address]      Thank you for allowing me to participate in the care of your patient.      Sincerely,     Anastacio Devlin MD     [facility]

## 2022-12-05 NOTE — PATIENT INSTRUCTIONS
Your blood pressure was elevated at your appointment today.  Elevated blood pressure can increase your risk of a heart attack, stroke and heart failure.  For this reason, we feel it is important to monitor your blood pressure closely.  If you have access to a home blood pressure monitor or are able to check your blood pressure at a local pharmacy in the next week we would like you to do so and call our clinic with those readings. Please call [phone] (Jesika) and leave a message with your name, date of birth, blood pressure reading, date and location it was completed. If your blood pressure remains elevated your care team will be notified.  We appreciate being a part of your healthcare team and look forward to hearing from you soon.

## 2022-12-05 NOTE — PROGRESS NOTES
"Electrophysiology/ Clinic Note         H&P and Plan:     REASON FOR VISIT: Electrophysiology evaluation.      HISTORY OF PRESENT ILLNESS: Patient is a pleasant MsRonnie Raygoza is a delightful, 87-year-old lady with a history of hypertension and paroxysmal AFib, who is here for routine evaluation.       The patient was initially diagnosed with AFib in 03/2014.  At that time she was admitted in the hospital with diarrhea and was found to have asymptomatic paroxysmal AFib.    She self converted back to normal rhythm.    Today, she informs she is doing well.  She denies any recurrence of A. fib.  She has no complaints during this visit. She denies chest pain, shortness of breath, lightheadedness, near-syncope or syncope.    BMP obtained last year was within normal limits.     PREVIOUS STUDIES (personally reviewed):  -Echo (3/2014): EF of 60 to 65%.      ASSESSMENT AND PLAN:   1.  Paroxysmal AFib.  She continues to be asymptomatic.  We will repeat an echocardiogram, and continue conservative approach.   2.  Embolic prevention.  CHADS-VASc score of 4, on Xarelto.  Continue anticoagulation indefinitely.   3.  Followup care.  Follow up in clinic in a year with [facility].     Anastacio Devlin MD    Physical Exam:  Vitals: /70   Pulse 90   Ht 1.575 m (5' 2\")   Wt 71.7 kg (158 lb)   LMP  (LMP Unknown)   BMI 28.90 kg/m      Constitutional:  AAO x3.  Pt is in NAD.  HEAD: normocephalic.  SKIN: Skin normal color, texture and turgor with no lesions or eruptions.  Eyes: PERRL, EOMI.  ENT:  Supple, normal JVP. No lymphadenopathy or thyroid enlargement.  Chest:  CTAB.  Cardiac:  RRR, normal  S1 and S2.  No murmurs rubs or gallop.    Abdomen:  Normal BS.  Soft, non-tender and non-distended.  No rebound or guarding.    Extremities:  Pedious pulses palpable B/L.  No LE edema noticed.   Neurological: Strength and sensation grossly symmetric and intact throughout.       CURRENT MEDICATIONS:  Current Outpatient Medications   Medication Sig " Dispense Refill     atorvastatin (LIPITOR) 20 MG tablet Take 1 tablet (20 mg) by mouth daily 90 tablet 2     Calcium Carbonate-Vitamin D (CALCIUM + D PO) Take 1 tablet by mouth daily       Cholecalciferol (VITAMIN D PO)        lisinopril (ZESTRIL) 20 MG tablet Take 1 tablet (20 mg) by mouth daily 90 tablet 2     Multiple Vitamins-Minerals (PRESERVISION AREDS 2 PO)        multivitamin (OCUVITE) TABS Take 1 tablet by mouth 2 times daily       rivaroxaban ANTICOAGULANT (XARELTO) 20 MG TABS tablet Take 1 tablet (20 mg) by mouth daily (with dinner) 90 tablet 3       ALLERGIES     Allergies   Allergen Reactions     Simvastatin Muscle Pain (Myalgia)       PAST MEDICAL HISTORY:  Past Medical History:   Diagnosis Date     Benign essential hypertension      PAF (paroxysmal atrial fibrillation) (H)      Pure hypercholesterolemia        PAST SURGICAL HISTORY:  Past Surgical History:   Procedure Laterality Date     COLONOSCOPY N/A 02/05/2020    Procedure: COLONOSCOPY, WITH POLYPECTOMY AND BIOPSY;  Surgeon: Kelesa Cabezas MD;  Location:  GI     D & C      [facility]AB     TONSILLECTOMY & ADENOIDECTOMY         FAMILY HISTORY:  The patient's family history was reviewed and is non-contributory for heart disease.    SOCIAL HISTORY:  Social History     Socioeconomic History     Marital status:    Occupational History     Occupation: Retired - [employer]   Tobacco Use     Smoking status: Never     Smokeless tobacco: Never   Substance and Sexual Activity     Alcohol use: Not Currently     Drug use: No     Sexual activity: Not Currently   Social History Narrative    .    4 adult kids.    8 grandchildren.    Lives in a Co-op. Fully independent.    Not able to exercise because of knee pain.        Review of Systems:  Skin:        Eyes:       ENT:       Respiratory:       Cardiovascular:       Gastroenterology:      Genitourinary:       Musculoskeletal:       Neurologic:       Psychiatric:       Heme/Lymph/Imm:        Endocrine:           Recent Lab Results:  LIPID RESULTS:  Lab Results   Component Value Date    CHOL 152 12/07/2021    CHOL 137 07/20/2020    HDL 69 12/07/2021    HDL 69 07/20/2020    LDL 68 12/07/2021    LDL 55 07/20/2020    TRIG 75 12/07/2021    TRIG 67 07/20/2020    CHOLHDLRATIO 3.2 04/13/2015       LIVER ENZYME RESULTS:  Lab Results   Component Value Date    AST 18 12/07/2021    AST 56 (H) 03/04/2014    ALT 26 12/07/2021    ALT 33 11/19/2015       CBC RESULTS:  Lab Results   Component Value Date    WBC 5.4 12/07/2021    WBC 4.4 04/03/2019    RBC 4.45 12/07/2021    RBC 4.65 04/03/2019    HGB 13.4 12/07/2021    HGB 14.4 04/03/2019    HCT 43.1 12/07/2021    HCT 43.8 04/03/2019    MCV 97 12/07/2021    MCV 94 04/03/2019    MCH 30.1 12/07/2021    MCH 31.0 04/03/2019    MCHC 31.1 (L) 12/07/2021    MCHC 32.9 04/03/2019    RDW 13.2 12/07/2021    RDW 13.0 04/03/2019     12/07/2021     04/03/2019       BMP RESULTS:  Lab Results   Component Value Date     12/07/2021     07/20/2020    POTASSIUM 3.8 12/07/2021    POTASSIUM 3.9 07/20/2020    CHLORIDE 104 12/07/2021    CHLORIDE 108 07/20/2020    CO2 27 12/07/2021    CO2 27 07/20/2020    ANIONGAP 6 12/07/2021    ANIONGAP 6 07/20/2020     (H) 12/07/2021    GLC 97 07/20/2020    BUN 17 12/07/2021    BUN 11 07/20/2020    CR 0.58 12/07/2021    CR 0.59 07/20/2020    GFRESTIMATED 84 12/07/2021    GFRESTIMATED 83 07/20/2020    GFRESTBLACK >90 07/20/2020    NITESH 8.5 12/07/2021    NITESH 8.7 07/20/2020        A1C RESULTS:  No results found for: A1C    INR RESULTS:  No results found for: INR      ECHOCARDIOGRAM  No results found for this or any previous visit (from the past 8760 hour(s)).      No orders of the defined types were placed in this encounter.    No orders of the defined types were placed in this encounter.    There are no discontinued medications.      Encounter Diagnosis   Name Primary?     Permanent atrial fibrillation (H)          OSMIN Chaves  James Devlin MD  [address]  [place] [address]

## 2022-12-20 DIAGNOSIS — Z79.01 ON CONTINUOUS ORAL ANTICOAGULATION: ICD-10-CM

## 2022-12-20 DIAGNOSIS — I10 BENIGN ESSENTIAL HYPERTENSION: ICD-10-CM

## 2022-12-20 DIAGNOSIS — I48.21 PERMANENT ATRIAL FIBRILLATION (H): ICD-10-CM

## 2022-12-20 DIAGNOSIS — E78.00 PURE HYPERCHOLESTEROLEMIA: ICD-10-CM

## 2022-12-20 RX ORDER — ATORVASTATIN CALCIUM 20 MG/1
20 TABLET, FILM COATED ORAL DAILY
Qty: 90 TABLET | Refills: 2 | Status: SHIPPED | OUTPATIENT
Start: 2022-12-20 | End: 2023-10-17

## 2022-12-20 RX ORDER — LISINOPRIL 20 MG/1
20 TABLET ORAL DAILY
Qty: 90 TABLET | Refills: 2 | Status: SHIPPED | OUTPATIENT
Start: 2022-12-20 | End: 2023-10-17

## 2022-12-30 ENCOUNTER — HOSPITAL ENCOUNTER (OUTPATIENT)
Dept: CARDIOLOGY | Facility: CLINIC | Age: 87
Discharge: HOME | End: 2022-12-30
Attending: INTERNAL MEDICINE | Admitting: INTERNAL MEDICINE
Payer: MEDICARE

## 2022-12-30 DIAGNOSIS — I48.21 PERMANENT ATRIAL FIBRILLATION (H): ICD-10-CM

## 2022-12-30 LAB — LVEF ECHO: NORMAL

## 2022-12-30 PROCEDURE — 93306 TTE W/DOPPLER COMPLETE: CPT

## 2022-12-30 PROCEDURE — 93306 TTE W/DOPPLER COMPLETE: CPT | Mod: 26 | Performed by: INTERNAL MEDICINE

## 2023-02-14 ENCOUNTER — OFFICE VISIT (OUTPATIENT)
Dept: INTERNAL MEDICINE | Facility: CLINIC | Age: 88
End: 2023-02-14
Payer: MEDICARE

## 2023-02-14 VITALS
SYSTOLIC BLOOD PRESSURE: 132 MMHG | RESPIRATION RATE: 18 BRPM | DIASTOLIC BLOOD PRESSURE: 62 MMHG | OXYGEN SATURATION: 98 % | HEART RATE: 98 BPM | TEMPERATURE: 98.1 F | WEIGHT: 162.52 LBS | HEIGHT: 63 IN | BODY MASS INDEX: 28.8 KG/M2

## 2023-02-14 DIAGNOSIS — R73.01 IMPAIRED FASTING GLUCOSE: ICD-10-CM

## 2023-02-14 DIAGNOSIS — Z00.00 MEDICARE ANNUAL WELLNESS VISIT, SUBSEQUENT: Primary | ICD-10-CM

## 2023-02-14 DIAGNOSIS — E78.00 PURE HYPERCHOLESTEROLEMIA: ICD-10-CM

## 2023-02-14 DIAGNOSIS — E55.9 VITAMIN D DEFICIENCY: ICD-10-CM

## 2023-02-14 DIAGNOSIS — Z13.1 SCREENING FOR DIABETES MELLITUS: ICD-10-CM

## 2023-02-14 DIAGNOSIS — I48.0 PAF (PAROXYSMAL ATRIAL FIBRILLATION) (H): ICD-10-CM

## 2023-02-14 LAB
ALBUMIN SERPL BCG-MCNC: 3.8 G/DL (ref 3.5–5.2)
ALP SERPL-CCNC: 96 U/L (ref 35–104)
ALT SERPL W P-5'-P-CCNC: 19 U/L (ref 10–35)
ANION GAP SERPL CALCULATED.3IONS-SCNC: 13 MMOL/L (ref 7–15)
AST SERPL W P-5'-P-CCNC: 26 U/L (ref 10–35)
BILIRUB SERPL-MCNC: 0.3 MG/DL
BUN SERPL-MCNC: 15 MG/DL (ref 8–23)
CALCIUM SERPL-MCNC: 9.3 MG/DL (ref 8.8–10.2)
CHLORIDE SERPL-SCNC: 105 MMOL/L (ref 98–107)
CHOLEST SERPL-MCNC: 148 MG/DL
CREAT SERPL-MCNC: 0.68 MG/DL (ref 0.51–0.95)
DEPRECATED CALCIDIOL+CALCIFEROL SERPL-MC: 44 UG/L (ref 20–75)
DEPRECATED HCO3 PLAS-SCNC: 22 MMOL/L (ref 22–29)
GFR SERPL CREATININE-BSD FRML MDRD: 84 ML/MIN/1.73M2
GLUCOSE SERPL-MCNC: 144 MG/DL (ref 70–99)
HBA1C MFR BLD: 6 % (ref 0–5.6)
HDLC SERPL-MCNC: 61 MG/DL
LDLC SERPL CALC-MCNC: 48 MG/DL
NONHDLC SERPL-MCNC: 87 MG/DL
POTASSIUM SERPL-SCNC: 4.1 MMOL/L (ref 3.4–5.3)
PROT SERPL-MCNC: 6.4 G/DL (ref 6.4–8.3)
SODIUM SERPL-SCNC: 140 MMOL/L (ref 136–145)
TRIGL SERPL-MCNC: 195 MG/DL

## 2023-02-14 PROCEDURE — 83036 HEMOGLOBIN GLYCOSYLATED A1C: CPT | Performed by: INTERNAL MEDICINE

## 2023-02-14 PROCEDURE — 36415 COLL VENOUS BLD VENIPUNCTURE: CPT | Performed by: INTERNAL MEDICINE

## 2023-02-14 PROCEDURE — 80053 COMPREHEN METABOLIC PANEL: CPT | Performed by: INTERNAL MEDICINE

## 2023-02-14 PROCEDURE — 80061 LIPID PANEL: CPT | Performed by: INTERNAL MEDICINE

## 2023-02-14 PROCEDURE — 82306 VITAMIN D 25 HYDROXY: CPT | Performed by: INTERNAL MEDICINE

## 2023-02-14 PROCEDURE — G0439 PPPS, SUBSEQ VISIT: HCPCS | Performed by: INTERNAL MEDICINE

## 2023-02-14 NOTE — LETTER
February 14, 2023      Aileen Raygoza  [address]  [facility] [address]        Dear ,    We are writing to inform you of your test results.    Your HgbA1c (not affected by fasting or non-fasting) is in the pre-diabetic range. Medication is not needed, but attention to a heart healthy diet, regular exercise, and achieving a healthy weight are recommended and encouraged.    The remainder of your labs are within or near normal limits.     Resulted Orders   Comprehensive metabolic panel   Result Value Ref Range    Sodium 140 136 - 145 mmol/L    Potassium 4.1 3.4 - 5.3 mmol/L    Chloride 105 98 - 107 mmol/L    Carbon Dioxide (CO2) 22 22 - 29 mmol/L    Anion Gap 13 7 - 15 mmol/L    Urea Nitrogen 15.0 8.0 - 23.0 mg/dL    Creatinine 0.68 0.51 - 0.95 mg/dL    Calcium 9.3 8.8 - 10.2 mg/dL    Glucose 144 (H) 70 - 99 mg/dL    Alkaline Phosphatase 96 35 - 104 U/L    AST 26 10 - 35 U/L    ALT 19 10 - 35 U/L    Protein Total 6.4 6.4 - 8.3 g/dL    Albumin 3.8 3.5 - 5.2 g/dL    Bilirubin Total 0.3 <=1.2 mg/dL    GFR Estimate 84 >60 mL/min/1.73m2   Lipid Profile   Result Value Ref Range    Cholesterol 148 <200 mg/dL    Triglycerides 195 (H) <150 mg/dL    Direct Measure HDL 61 >=50 mg/dL    LDL Cholesterol Calculated 48 <=100 mg/dL    Non HDL Cholesterol 87 <130 mg/dL   Vitamin D Deficiency   Result Value Ref Range    Vitamin D, Total (25-Hydroxy) 44 20 - 75 ug/L   Hemoglobin A1c   Result Value Ref Range    Hemoglobin A1C 6.0 (H) 0.0 - 5.6 %     If you have any questions or concerns, please call the clinic at the number listed above.       Sincerely,      Katie Mcdaniel MD

## 2023-02-14 NOTE — PROGRESS NOTES
ASSESSMENT/PLAN                                                       (Z00.00) Medicare annual wellness visit, subsequent  (primary encounter diagnosis)  Comment: PMH, PSH, FH, SH, medications, allergies, immunizations, and preventative health measures reviewed and updated as appropriate.  Plan: see below for plans.      (E78.00) Pure hypercholesterolemia  (Z13.1) Screening for diabetes mellitus  (E55.9) Vitamin D deficiency  (R73.01) Impaired fasting glucose  Plan: non-fasting labs today.     (I48.0) PAF (paroxysmal atrial fibrillation) (H)  Comment: rate controlled without medication; on chronic AC with Xarelto; followed by cardiology.    Appropriate preventive services were discussed with this patient, including applicable screening as appropriate for cardiovascular disease, diabetes, osteopenia/osteoporosis, and glaucoma.  As appropriate for age/gender, discussed screening for colorectal cancer, prostate cancer, breast cancer, and cervical cancer. Checklist reviewing preventive services available has been given to the patient.    Reviewed patients plan of care. The Basic Care Plan (routine screening as documented in Health Maintenance) for Aileen Raygoza meets the Care Plan requirement. This Care Plan has been established and reviewed with the Patient.    Katie Mcdaniel MD   [address] [address]  T: [phone], F: [phone]    SUBJECTIVE                                                      Aileen Raygoza is a very pleasant 87 year old female who presents for her subsequent AWV:    Current providers (other than myself): Claus (cardiology)    PMH, PSH, FH, SH, medications, allergies, immunizations, preventative health, and health risk assessment reviewed and updated as appropriate.    Past Medical History:   Diagnosis Date     Benign essential hypertension      PAF (paroxysmal atrial fibrillation) (H)      Pure hypercholesterolemia      Past Surgical  History:   Procedure Laterality Date     COLONOSCOPY N/A 02/05/2020    Procedure: COLONOSCOPY, WITH POLYPECTOMY AND BIOPSY;  Surgeon: Kelsea Cabezas MD;  Location:  GI     D & C      S[facility]     TONSILLECTOMY & ADENOIDECTOMY       Family History   Problem Relation Age of Onset     Colon Cancer Mother      Impaired Fasting Glucose Mother      Alzheimer Disease Brother      Coronary Artery Disease Brother         s/p PCI later in life     Diabetes No family hx of      Cerebrovascular Disease No family hx of      Myocardial Infarction No family hx of      Coronary Artery Disease Early Onset No family hx of      Breast Cancer No family hx of      Ovarian Cancer No family hx of      Social History     Occupational History     Occupation: Retired - [employer]   Tobacco Use     Smoking status: Never     Smokeless tobacco: Never   Vaping Use     Vaping Use: Never used   Substance and Sexual Activity     Alcohol use: Not Currently     Drug use: No     Sexual activity: Not Currently   Social History Narrative    .    4 adult kids.    8 grandchildren.    Lives in a Co-op. Fully independent.    Not able to exercise because of knee pain.      Allergies   Allergen Reactions     Simvastatin Muscle Pain (Myalgia)     Current Outpatient Medications   Medication Sig     atorvastatin (LIPITOR) 20 MG tablet Take 1 tablet (20 mg) by mouth daily     Calcium Carbonate-Vitamin D (CALCIUM + D PO) Take 1 tablet by mouth daily     Cholecalciferol (VITAMIN D PO)      lisinopril (ZESTRIL) 20 MG tablet Take 1 tablet (20 mg) by mouth daily     Multiple Vitamins-Minerals (PRESERVISION AREDS 2 PO)      multivitamin (OCUVITE) TABS Take 1 tablet by mouth 2 times daily     rivaroxaban ANTICOAGULANT (XARELTO) 20 MG TABS tablet Take 1 tablet (20 mg) by mouth daily (with dinner)     Immunization History   Administered Date(s) Administered     COVID-19 Vaccine 12+ (Pfizer 2022) 05/10/2022     COVID-19 Vaccine 12+ (Pfizer) 02/26/2021,  "03/19/2021, 10/14/2021     COVID-19 Vaccine Bivalent Booster 12+ (Pfizer) 10/06/2022     Influenza (High Dose) 3 valent vaccine 10/10/2014, 09/19/2016, 09/26/2017, 10/17/2018     Influenza Vaccine 65+ (Fluzone HD) 10/10/2020     Influenza Vaccine >6 months (Alfuria,Fluzone) 10/15/2015     Pneumo Conj 13-V (2010&after) 11/19/2015     Pneumococcal 23 valent 09/11/2013     Tdap (Adacel,Boostrix) 01/01/2010     Zoster vaccine recombinant adjuvanted (SHINGRIX) 10/10/2020, 12/11/2020     Zoster vaccine, live 08/07/2009     PREVENTATIVE HEALTH                                                      BMI: overweight  Blood pressure: well-controlled on current regimen   Breast CA screening: screening no longer indicated  Colon CA screening: screening no longer indicated  Lung CA screening: n/a   Dexa: up to date   Screening cholesterol: n/a - already being treated for this condition  Screening diabetes: DUE  Alcohol misuse screening: alcohol use reviewed - no intervention indicated at this time  Immunizations: reviewed; tetanus booster DUE - not covered by Medicare (may obtain in pharmacy if desired)     HEALTH RISK ASSESSMENT                                                      In general, how would you rate your overall physical health? good  Outside of work, how many days during the week do you exercise? None  Outside of work, approximately how many minutes a day do you exercise? n/a     If you drink alcohol do you typically have >3 drinks per day or >7 drinks per week? No  Do you usually eat at least 4 servings of fruit and vegetables a day, include whole grains & fiber and avoid regularly eating high fat or \"junk\" foods? Yes     Do you have any problems taking medications regularly? No  Do you have any side effects from medications? No    Assistance with daily activities: No    Safety concerns: No    Fall risk assessment: completed today (see ambulatory assessments)    Hearing concerns: No    In the past 6 months, have you " "been bothered by leaking of urine: No    In general, how would you rate your overall mental or emotional health: good    PHQ-2/PHQ-9 assessment: completed today (see ambulatory assessments)    Additional concerns today: No    OBJECTIVE                                                      /62   Pulse 98   Temp 98.1  F (36.7  C) (Temporal)   Resp 18   Ht 1.588 m (5' 2.5\")   Wt 73.7 kg (162 lb 8.3 oz)   LMP  (LMP Unknown)   SpO2 98%   BMI 29.25 kg/m    Constitutional: well-appearing  Head, Ears, and Eyes: normocephalic; normal external auditory canal and pinna; tympanic membranes visualized and normal; normal lids and conjunctivae  Neck: supple, symmetric, no thyromegaly or lymphadenopathy  Respiratory: normal respiratory effort; clear to auscultation bilaterally  Cardiovascular: regular rate and rhythm; no edema  Gastrointestinal: soft, non-tender, and non-distended; no organomegaly or masses  Musculoskeletal: normal gait and station  Psych: normal judgment and insight; normal mood and affect; recent and remote memory intact    Cognitive impairment noted: No  ---  (Note was completed, in part, with [employer] (Closed) voice-recognition software. Documentation was reviewed, but some grammatical, spelling, and word errors may remain.)  "

## 2023-02-16 ENCOUNTER — TELEPHONE (OUTPATIENT)
Dept: INTERNAL MEDICINE | Facility: CLINIC | Age: 88
End: 2023-02-16
Payer: MEDICARE

## 2023-02-16 NOTE — TELEPHONE ENCOUNTER
Spoke to patient and she understands her labs and recommendations.     Linda Murphy RN  [facility]

## 2023-04-24 ENCOUNTER — MEDICAL CORRESPONDENCE (OUTPATIENT)
Dept: HEALTH INFORMATION MANAGEMENT | Facility: CLINIC | Age: 88
End: 2023-04-24

## 2023-05-08 NOTE — PLAN OF CARE
PATIENT INFORMATION    PATIENT NAME:   Aileen Raygoza    :  1935    SURGEON: Dr. Jitendra Blas    PROEDURE: Left Total Knee Arthroplasty    DATE OF SURGERY:  23 @ 730am    HOSPITAL: [facility]    PATIENT STATUS (inpatient, outpatient in a bed outpatient): Outpatient    ANTICIPATED DISCHARGE DATE:  When availability for TCU is open:  possibly 3 days--Aileen mentioned that she may need 3 days in hospital for transfer per her insurance    PCP: [facility]    INSURANCE: Medicare, [employer]         PATIENT/FAMILY ASSESSMENT         MEDICAL    BMI:  27.46    COVID STATUS (vaccinated/boosted): Yes--All boosters    AGE: 88    MEDICAL HISTORY (COPD, heart issues, etc): A-Fib--takes Xarelto, hypertension, cholesterol issues    DOUCMENT SUPPORT FOR INPATIENT STATUS IF APPLICABLE:  patient is over 85 years old (2pts), patient has a-fib and on meds (1pt), patient has hypertension and on meds, patient lives alone    FUNCTIONAL    CURRENTLY AMBULATION (wheelchair/walker): none    FALLS IN THE LAST 6 MONTHS: none         LIVING SITUATION    STAIRS TO ENTER HOME: no stairsàunit is on ground level    BATHROOM SETUP (walk in shower, standard toilet): 2 toilets in home, one is normal height and other is higher, walk in shower    ANY OTHER CONCERNS: lives alone, has 4 children (one in [place], other 3 in [facility] but working full time)         DISCHARGE                   TCU/SNF:        [facility]. [facility]                                       [facility]. [facility]                                       3. [facility]--last resort         Back up plan--Home with [facility] (referral to be sent)                        ANY SPECIFIC NEEDS (, OXYGEN, BARIATRIC BED, ETC): none         Please let me know if you need any additional information from me.         Thanks,    Ami Kahn    Care Coordinator for Dr Bonilla and Dr Blas         [facility] -  [place] [id number] [place] [address]    P: [phone]    F: [phone]       [email or web address]

## 2023-06-05 ENCOUNTER — OFFICE VISIT (OUTPATIENT)
Dept: INTERNAL MEDICINE | Facility: CLINIC | Age: 88
End: 2023-06-05
Payer: MEDICARE

## 2023-06-05 VITALS
WEIGHT: 158 LBS | SYSTOLIC BLOOD PRESSURE: 130 MMHG | TEMPERATURE: 98.1 F | OXYGEN SATURATION: 95 % | HEART RATE: 91 BPM | HEIGHT: 63 IN | DIASTOLIC BLOOD PRESSURE: 80 MMHG | BODY MASS INDEX: 28 KG/M2

## 2023-06-05 DIAGNOSIS — M17.12 PRIMARY OSTEOARTHRITIS OF LEFT KNEE: ICD-10-CM

## 2023-06-05 DIAGNOSIS — R31.29 MICROSCOPIC HEMATURIA: ICD-10-CM

## 2023-06-05 DIAGNOSIS — Z01.818 PREOPERATIVE EXAMINATION: Primary | ICD-10-CM

## 2023-06-05 LAB
ALBUMIN SERPL BCG-MCNC: 4.2 G/DL (ref 3.5–5.2)
ALBUMIN UR-MCNC: NEGATIVE MG/DL
ALP SERPL-CCNC: 97 U/L (ref 35–104)
ALT SERPL W P-5'-P-CCNC: 16 U/L (ref 10–35)
ANION GAP SERPL CALCULATED.3IONS-SCNC: 11 MMOL/L (ref 7–15)
APPEARANCE UR: CLEAR
AST SERPL W P-5'-P-CCNC: 21 U/L (ref 10–35)
BACTERIA #/AREA URNS HPF: ABNORMAL /HPF
BILIRUB SERPL-MCNC: 0.7 MG/DL
BILIRUB UR QL STRIP: NEGATIVE
BUN SERPL-MCNC: 10.8 MG/DL (ref 8–23)
CALCIUM SERPL-MCNC: 9.3 MG/DL (ref 8.8–10.2)
CHLORIDE SERPL-SCNC: 105 MMOL/L (ref 98–107)
COLOR UR AUTO: YELLOW
CREAT SERPL-MCNC: 0.64 MG/DL (ref 0.51–0.95)
DEPRECATED HCO3 PLAS-SCNC: 25 MMOL/L (ref 22–29)
ERYTHROCYTE [DISTWIDTH] IN BLOOD BY AUTOMATED COUNT: 12.9 % (ref 10–15)
GFR SERPL CREATININE-BSD FRML MDRD: 85 ML/MIN/1.73M2
GLUCOSE SERPL-MCNC: 105 MG/DL (ref 70–99)
GLUCOSE UR STRIP-MCNC: NEGATIVE MG/DL
HCT VFR BLD AUTO: 44.5 % (ref 35–47)
HGB BLD-MCNC: 14 G/DL (ref 11.7–15.7)
HGB UR QL STRIP: ABNORMAL
KETONES UR STRIP-MCNC: NEGATIVE MG/DL
LEUKOCYTE ESTERASE UR QL STRIP: ABNORMAL
MCH RBC QN AUTO: 29.9 PG (ref 26.5–33)
MCHC RBC AUTO-ENTMCNC: 31.5 G/DL (ref 31.5–36.5)
MCV RBC AUTO: 95 FL (ref 78–100)
MUCOUS THREADS #/AREA URNS LPF: PRESENT /LPF
NITRATE UR QL: NEGATIVE
PH UR STRIP: 6 [PH] (ref 5–7)
PLATELET # BLD AUTO: 231 10E3/UL (ref 150–450)
POTASSIUM SERPL-SCNC: 4 MMOL/L (ref 3.4–5.3)
PROT SERPL-MCNC: 6.7 G/DL (ref 6.4–8.3)
RBC # BLD AUTO: 4.68 10E6/UL (ref 3.8–5.2)
RBC #/AREA URNS AUTO: ABNORMAL /HPF
SODIUM SERPL-SCNC: 141 MMOL/L (ref 136–145)
SP GR UR STRIP: 1.02 (ref 1–1.03)
SQUAMOUS #/AREA URNS AUTO: ABNORMAL /LPF
UROBILINOGEN UR STRIP-ACNC: 0.2 E.U./DL
WBC # BLD AUTO: 5.3 10E3/UL (ref 4–11)
WBC #/AREA URNS AUTO: ABNORMAL /HPF

## 2023-06-05 PROCEDURE — 80053 COMPREHEN METABOLIC PANEL: CPT | Performed by: INTERNAL MEDICINE

## 2023-06-05 PROCEDURE — 99214 OFFICE O/P EST MOD 30 MIN: CPT | Performed by: INTERNAL MEDICINE

## 2023-06-05 PROCEDURE — 81001 URINALYSIS AUTO W/SCOPE: CPT | Performed by: INTERNAL MEDICINE

## 2023-06-05 PROCEDURE — 85027 COMPLETE CBC AUTOMATED: CPT | Performed by: INTERNAL MEDICINE

## 2023-06-05 PROCEDURE — 93000 ELECTROCARDIOGRAM COMPLETE: CPT | Performed by: INTERNAL MEDICINE

## 2023-06-05 PROCEDURE — 36415 COLL VENOUS BLD VENIPUNCTURE: CPT | Performed by: INTERNAL MEDICINE

## 2023-06-05 ASSESSMENT — PATIENT HEALTH QUESTIONNAIRE - PHQ9
10. IF YOU CHECKED OFF ANY PROBLEMS, HOW DIFFICULT HAVE THESE PROBLEMS MADE IT FOR YOU TO DO YOUR WORK, TAKE CARE OF THINGS AT HOME, OR GET ALONG WITH OTHER PEOPLE: SOMEWHAT DIFFICULT
SUM OF ALL RESPONSES TO PHQ QUESTIONS 1-9: 0
SUM OF ALL RESPONSES TO PHQ QUESTIONS 1-9: 0

## 2023-06-05 NOTE — PROGRESS NOTES
ASSESSMENT/PLAN:                                                      Aileen Raygoza is a pleasant 88 year old female who presents for a preoperative evaluation. She is undergoing an intermediate risk procedure. Her risk of major cardiac event is 0 points: 0.4%.    (Z01.818) Preoperative examination  (primary encounter diagnosis)  (M17.12) Primary osteoarthritis of left knee  Plan: EKG, CBC, CMP, and UA reflex today; last dose of Xarelto 6/9/2023; restart Xarelto per surgeon's discretion; may continue all other medications up to and on the morning of surgery.    RECOMMEND PROCEEDING WITH SURGERY: YES    Katie Mcdaniel MD   [place] [address] [address] [address]  T: [phone], F: [phone]    SUBJECTIVE:                                                      Aileen Raygoza is a very pleasant 88 year old female who presents for preoperative evaluation.    Surgeon: Roopa  Date: 6/12/2023  Location:  OR  Surgery: LEFT total knee arthroplasty (intermediate risk)  Indication: LEFT knee osteoarthritis    Past Medical History:   Diagnosis Date     Benign essential hypertension      Impaired fasting glucose      PAF (paroxysmal atrial fibrillation) (H)      Pure hypercholesterolemia      Personal or family history of excessive or prolonged bleeding? No  Personal or family history of blood clots? No  History of snoring/Sleep Apnea? No  History of blood transfusions? No    Cardiovascular risk: None (0 points)    Risk of major cardiac event: 0 points: 0.4%    Past Surgical History:   Procedure Laterality Date     COLONOSCOPY N/A 02/05/2020    Procedure: COLONOSCOPY, WITH POLYPECTOMY AND BIOPSY;  Surgeon: Kelsea Cabezas MD;  Location:  GI     D & C      SAB     TONSILLECTOMY & ADENOIDECTOMY       Artificial assistive devices, such as pacemakers, artificial cardiac valves, or artificial joints? No    Allergies   Allergen Reactions     Simvastatin Muscle Pain (Myalgia)     Personal  or family history of allergy to anesthesia? No  Allergy to local or topical analgesics? No  Allergy to latex? No  Allergy to adhesives/skin preparations (chlorhexadine)? No    Current Outpatient Medications   Medication Sig     atorvastatin (LIPITOR) 20 MG tablet Take 1 tablet (20 mg) by mouth daily     Calcium Carbonate-Vitamin D (CALCIUM + D PO) Take 1 tablet by mouth daily     Cholecalciferol (VITAMIN D PO)      lisinopril (ZESTRIL) 20 MG tablet Take 1 tablet (20 mg) by mouth daily     Multiple Vitamins-Minerals (PRESERVISION AREDS 2 PO)      multivitamin (OCUVITE) TABS Take 1 tablet by mouth 2 times daily     rivaroxaban ANTICOAGULANT (XARELTO) 20 MG TABS tablet Take 1 tablet (20 mg) by mouth daily (with dinner)     Over the counter medications? No  Vitamin Supplements? Other than above, no  Herbal Supplements? No    Family History   Problem Relation Age of Onset     Colon Cancer Mother      Impaired Fasting Glucose Mother      Alzheimer Disease Brother      Coronary Artery Disease Brother         s/p PCI later in life     Diabetes No family hx of      Cerebrovascular Disease No family hx of      Myocardial Infarction No family hx of      Coronary Artery Disease Early Onset No family hx of      Breast Cancer No family hx of      Ovarian Cancer No family hx of      Social History     Occupational History     Occupation: Retired - [employer]   Tobacco Use     Smoking status: Never     Smokeless tobacco: Never   Vaping Use     Vaping status: Never Used   Substance and Sexual Activity     Alcohol use: Not Currently     Drug use: No     Sexual activity: Not Currently   Social History Narrative    .    4 adult kids.    8 grandchildren.    Lives in a Co-op. Fully independent.    Leg and arm exercises.      Functional capacity: Can do light work around the house like dusting or washing dishes (4 METs)    OBJECTIVE:                                                      /80   Pulse 91   Temp 98.1  F  "(36.7  C)   Ht 1.588 m (5' 2.5\")   Wt 71.7 kg (158 lb)   LMP  (LMP Unknown)   SpO2 95%   BMI 28.44 kg/m    Constitutional: well-appearing  Respiratory: normal respiratory effort; clear to auscultation bilaterally  Cardiovascular: regular rate and rhythm; no edema  Gastrointestinal: soft, non-tender, non-distended, and bowel sounds present; no organomegaly or masses  Musculoskeletal: normal gait and station  Psych: normal judgment and insight; normal mood and affect    ---    (Chart documentation was completed, in part, with [employer] voice-recognition software. Even though reviewed, some grammatical, spelling, and word errors may remain.)  "

## 2023-06-05 NOTE — H&P (VIEW-ONLY)
ASSESSMENT/PLAN:                                                      Aileen Raygoza is a pleasant 88 year old female who presents for a preoperative evaluation. She is undergoing an intermediate risk procedure. Her risk of major cardiac event is 0 points: 0.4%.    (Z01.818) Preoperative examination  (primary encounter diagnosis)  (M17.12) Primary osteoarthritis of left knee  Plan: EKG, CBC, CMP, and UA reflex today; last dose of Xarelto 6/9/2023; restart Xarelto per surgeon's discretion; may continue all other medications up to and on the morning of surgery.    RECOMMEND PROCEEDING WITH SURGERY: YES    Katie Mcdaniel MD   [place] [address] [address] [address]  T: [phone], F: [phone]    SUBJECTIVE:                                                      Aileen Raygoza is a very pleasant 88 year old female who presents for preoperative evaluation.    Surgeon: Roopa  Date: 6/12/2023  Location:  OR  Surgery: LEFT total knee arthroplasty (intermediate risk)  Indication: LEFT knee osteoarthritis    Past Medical History:   Diagnosis Date     Benign essential hypertension      Impaired fasting glucose      PAF (paroxysmal atrial fibrillation) (H)      Pure hypercholesterolemia      Personal or family history of excessive or prolonged bleeding? No  Personal or family history of blood clots? No  History of snoring/Sleep Apnea? No  History of blood transfusions? No    Cardiovascular risk: None (0 points)    Risk of major cardiac event: 0 points: 0.4%    Past Surgical History:   Procedure Laterality Date     COLONOSCOPY N/A 02/05/2020    Procedure: COLONOSCOPY, WITH POLYPECTOMY AND BIOPSY;  Surgeon: Kelsea Cabezas MD;  Location:  GI     D & C      SAB     TONSILLECTOMY & ADENOIDECTOMY       Artificial assistive devices, such as pacemakers, artificial cardiac valves, or artificial joints? No    Allergies   Allergen Reactions     Simvastatin Muscle Pain (Myalgia)     Personal  or family history of allergy to anesthesia? No  Allergy to local or topical analgesics? No  Allergy to latex? No  Allergy to adhesives/skin preparations (chlorhexadine)? No    Current Outpatient Medications   Medication Sig     atorvastatin (LIPITOR) 20 MG tablet Take 1 tablet (20 mg) by mouth daily     Calcium Carbonate-Vitamin D (CALCIUM + D PO) Take 1 tablet by mouth daily     Cholecalciferol (VITAMIN D PO)      lisinopril (ZESTRIL) 20 MG tablet Take 1 tablet (20 mg) by mouth daily     Multiple Vitamins-Minerals (PRESERVISION AREDS 2 PO)      multivitamin (OCUVITE) TABS Take 1 tablet by mouth 2 times daily     rivaroxaban ANTICOAGULANT (XARELTO) 20 MG TABS tablet Take 1 tablet (20 mg) by mouth daily (with dinner)     Over the counter medications? No  Vitamin Supplements? Other than above, no  Herbal Supplements? No    Family History   Problem Relation Age of Onset     Colon Cancer Mother      Impaired Fasting Glucose Mother      Alzheimer Disease Brother      Coronary Artery Disease Brother         s/p PCI later in life     Diabetes No family hx of      Cerebrovascular Disease No family hx of      Myocardial Infarction No family hx of      Coronary Artery Disease Early Onset No family hx of      Breast Cancer No family hx of      Ovarian Cancer No family hx of      Social History     Occupational History     Occupation: Retired - [employer]   Tobacco Use     Smoking status: Never     Smokeless tobacco: Never   Vaping Use     Vaping status: Never Used   Substance and Sexual Activity     Alcohol use: Not Currently     Drug use: No     Sexual activity: Not Currently   Social History Narrative    .    4 adult kids.    8 grandchildren.    Lives in a Co-op. Fully independent.    Leg and arm exercises.      Functional capacity: Can do light work around the house like dusting or washing dishes (4 METs)    OBJECTIVE:                                                      /80   Pulse 91   Temp 98.1  F  "(36.7  C)   Ht 1.588 m (5' 2.5\")   Wt 71.7 kg (158 lb)   LMP  (LMP Unknown)   SpO2 95%   BMI 28.44 kg/m    Constitutional: well-appearing  Respiratory: normal respiratory effort; clear to auscultation bilaterally  Cardiovascular: regular rate and rhythm; no edema  Gastrointestinal: soft, non-tender, non-distended, and bowel sounds present; no organomegaly or masses  Musculoskeletal: normal gait and station  Psych: normal judgment and insight; normal mood and affect    ---    (Chart documentation was completed, in part, with [employer] voice-recognition software. Even though reviewed, some grammatical, spelling, and word errors may remain.)  "

## 2023-06-05 NOTE — LETTER
June 5, 2023      Aileen Raygoza  [address]  [facility] [place] [place] [address]        Dear ,    We are writing to inform you of your test results.    Your labs are generally within or near normal limits.    Your urine did have some microscopic blood. For this I would recommend a follow-up urine sample in the next 1-3 months (non-urgent) to make sure that microscopic blood has cleared. This can be a lab only visit.     You may proceed with surgery.    Resulted Orders   CBC with platelets   Result Value Ref Range    WBC Count 5.3 4.0 - 11.0 10e3/uL    RBC Count 4.68 3.80 - 5.20 10e6/uL    Hemoglobin 14.0 11.7 - 15.7 g/dL    Hematocrit 44.5 35.0 - 47.0 %    MCV 95 78 - 100 fL    MCH 29.9 26.5 - 33.0 pg    MCHC 31.5 31.5 - 36.5 g/dL    RDW 12.9 10.0 - 15.0 %    Platelet Count 231 150 - 450 10e3/uL   Comprehensive metabolic panel   Result Value Ref Range    Sodium 141 136 - 145 mmol/L    Potassium 4.0 3.4 - 5.3 mmol/L    Chloride 105 98 - 107 mmol/L    Carbon Dioxide (CO2) 25 22 - 29 mmol/L    Anion Gap 11 7 - 15 mmol/L    Urea Nitrogen 10.8 8.0 - 23.0 mg/dL    Creatinine 0.64 0.51 - 0.95 mg/dL    Calcium 9.3 8.8 - 10.2 mg/dL    Glucose 105 (H) 70 - 99 mg/dL    Alkaline Phosphatase 97 35 - 104 U/L    AST 21 10 - 35 U/L    ALT 16 10 - 35 U/L    Protein Total 6.7 6.4 - 8.3 g/dL    Albumin 4.2 3.5 - 5.2 g/dL    Bilirubin Total 0.7 <=1.2 mg/dL    GFR Estimate 85 >60 mL/min/1.73m2   UA Macroscopic with reflex to Microscopic and Culture   Result Value Ref Range    Color Urine Yellow Colorless, Straw, Light Yellow, Yellow    Appearance Urine Clear Clear    Glucose Urine Negative Negative mg/dL    Bilirubin Urine Negative Negative    Ketones Urine Negative Negative mg/dL    Specific Gravity Urine 1.020 1.003 - 1.035    Blood Urine Small (A) Negative    pH Urine 6.0 5.0 - 7.0    Protein Albumin Urine Negative Negative mg/dL    Urobilinogen Urine 0.2 0.2, 1.0 E.U./dL    Nitrite Urine Negative Negative    Leukocyte  Esterase Urine Small (A) Negative   UA Microscopic with Reflex to Culture   Result Value Ref Range    Bacteria Urine Few (A) None Seen /HPF    RBC Urine 2-5 (A) 0-2 /HPF /HPF    WBC Urine 0-5 0-5 /HPF /HPF    Squamous Epithelials Urine Few (A) None Seen /LPF    Mucus Urine Present (A) None Seen /LPF     If you have any questions or concerns, please call the clinic at the number listed above.       Sincerely,      Katie Mcdaniel MD

## 2023-06-05 NOTE — PATIENT INSTRUCTIONS
Labs and urine today.    --    Last Xarelto dose: Friday, June 9th.    No Xarelto Saturday, Sunday, or Monday.    Restarting Xarelto per surgeon.     ---    TAKE lisinopril only on the morning of surgery with a sip of water.    Okay to take atorvastatin the night before surgery.

## 2023-06-09 NOTE — PROGRESS NOTES
PTA medications updated by Medication Scribe prior to surgery via phone call with patient (last doses completed by Nurse)     Medication history sources: Patient, Surescripts and H&P  In the past week, patient estimated taking medication this percent of the time: Greater than 90%      Significant changes made to the medication list:  None      Additional medication history information:   None    Medication reconciliation completed by provider prior to medication history? No    Time spent in this activity: 25 minutes    The information provided in this note is only as accurate as the sources available at the time of update(s)    Prior to Admission medications    Medication Sig Last Dose Taking? Auth Provider Long Term End Date   atorvastatin (LIPITOR) 20 MG tablet Take 1 tablet (20 mg) by mouth daily  at pm Yes Katie Mcdaniel MD Yes    Calcium Carbonate-Vitamin D (CALCIUM + D PO) Take 1 tablet by mouth daily 6/9/2023 at am Yes Reported, Patient     Cholecalciferol (VITAMIN D PO) Take 1 tablet by mouth daily 6/9/2023 at am Yes Reported, Patient     diphenhydrAMINE-acetaminophen (TYLENOL PM)  MG tablet Take 1 tablet by mouth At Bedtime  at pm Yes Reported, Patient     lisinopril (ZESTRIL) 20 MG tablet Take 1 tablet (20 mg) by mouth daily  at am Yes Katie Mcdaniel MD Yes    Multiple Vitamins-Minerals (PRESERVISION AREDS 2 PO) Take 1 capsule by mouth 2 times daily  at pm Yes Reported, Patient     rivaroxaban ANTICOAGULANT (XARELTO) 20 MG TABS tablet Take 1 tablet (20 mg) by mouth daily (with dinner) 6/9/2023 at pm Yes Katie Mcdaniel MD Yes      Medication history completed by:    Eran Acevedo CPhT  Medication Scribe  [facility]

## 2023-06-12 ENCOUNTER — APPOINTMENT (OUTPATIENT)
Dept: PHYSICAL THERAPY | Facility: CLINIC | Age: 88
DRG: 470 | End: 2023-06-12
Attending: ORTHOPAEDIC SURGERY
Payer: MEDICARE

## 2023-06-12 ENCOUNTER — APPOINTMENT (OUTPATIENT)
Dept: GENERAL RADIOLOGY | Facility: CLINIC | Age: 88
DRG: 470 | End: 2023-06-12
Attending: ORTHOPAEDIC SURGERY
Payer: MEDICARE

## 2023-06-12 ENCOUNTER — ANESTHESIA EVENT (OUTPATIENT)
Dept: SURGERY | Facility: CLINIC | Age: 88
DRG: 470 | End: 2023-06-12
Payer: MEDICARE

## 2023-06-12 ENCOUNTER — ANESTHESIA (OUTPATIENT)
Dept: SURGERY | Facility: CLINIC | Age: 88
DRG: 470 | End: 2023-06-12
Payer: MEDICARE

## 2023-06-12 ENCOUNTER — HOSPITAL ENCOUNTER (INPATIENT)
Facility: CLINIC | Age: 88
LOS: 3 days | Discharge: NURSING, REHAB OR LONG-TERM CARE | DRG: 470 | End: 2023-06-15
Attending: ORTHOPAEDIC SURGERY | Admitting: ORTHOPAEDIC SURGERY
Payer: MEDICARE

## 2023-06-12 DIAGNOSIS — Z96.652 S/P TOTAL KNEE REPLACEMENT, LEFT: Primary | ICD-10-CM

## 2023-06-12 DIAGNOSIS — Z96.652 S/P TOTAL KNEE ARTHROPLASTY, LEFT: ICD-10-CM

## 2023-06-12 LAB
CREAT SERPL-MCNC: 0.71 MG/DL (ref 0.51–0.95)
GFR SERPL CREATININE-BSD FRML MDRD: 81 ML/MIN/1.73M2
GLUCOSE SERPL-MCNC: 100 MG/DL (ref 70–99)
POTASSIUM SERPL-SCNC: 4.3 MMOL/L (ref 3.4–5.3)

## 2023-06-12 PROCEDURE — 82565 ASSAY OF CREATININE: CPT | Performed by: ORTHOPAEDIC SURGERY

## 2023-06-12 PROCEDURE — 82947 ASSAY GLUCOSE BLOOD QUANT: CPT | Performed by: ANESTHESIOLOGY

## 2023-06-12 PROCEDURE — 250N000011 HC RX IP 250 OP 636: Performed by: PHYSICIAN ASSISTANT

## 2023-06-12 PROCEDURE — C1776 JOINT DEVICE (IMPLANTABLE): HCPCS | Performed by: ORTHOPAEDIC SURGERY

## 2023-06-12 PROCEDURE — 710N000009 HC RECOVERY PHASE 1, LEVEL 1, PER MIN: Performed by: ORTHOPAEDIC SURGERY

## 2023-06-12 PROCEDURE — 258N000003 HC RX IP 258 OP 636: Performed by: ORTHOPAEDIC SURGERY

## 2023-06-12 PROCEDURE — 258N000003 HC RX IP 258 OP 636: Performed by: ANESTHESIOLOGY

## 2023-06-12 PROCEDURE — 36415 COLL VENOUS BLD VENIPUNCTURE: CPT | Performed by: ANESTHESIOLOGY

## 2023-06-12 PROCEDURE — 250N000009 HC RX 250: Performed by: ORTHOPAEDIC SURGERY

## 2023-06-12 PROCEDURE — 97110 THERAPEUTIC EXERCISES: CPT | Mod: GP

## 2023-06-12 PROCEDURE — 250N000011 HC RX IP 250 OP 636: Performed by: ORTHOPAEDIC SURGERY

## 2023-06-12 PROCEDURE — 250N000009 HC RX 250: Performed by: ANESTHESIOLOGY

## 2023-06-12 PROCEDURE — 250N000011 HC RX IP 250 OP 636: Performed by: NURSE ANESTHETIST, CERTIFIED REGISTERED

## 2023-06-12 PROCEDURE — 999N000063 XR KNEE PORT LEFT 1/2 VIEWS: Mod: LT

## 2023-06-12 PROCEDURE — 250N000013 HC RX MED GY IP 250 OP 250 PS 637: Performed by: ORTHOPAEDIC SURGERY

## 2023-06-12 PROCEDURE — 250N000013 HC RX MED GY IP 250 OP 250 PS 637: Performed by: NURSE PRACTITIONER

## 2023-06-12 PROCEDURE — 258N000003 HC RX IP 258 OP 636: Performed by: NURSE ANESTHETIST, CERTIFIED REGISTERED

## 2023-06-12 PROCEDURE — 272N000001 HC OR GENERAL SUPPLY STERILE: Performed by: ORTHOPAEDIC SURGERY

## 2023-06-12 PROCEDURE — 250N000011 HC RX IP 250 OP 636: Performed by: ANESTHESIOLOGY

## 2023-06-12 PROCEDURE — 0SRD0J9 REPLACEMENT OF LEFT KNEE JOINT WITH SYNTHETIC SUBSTITUTE, CEMENTED, OPEN APPROACH: ICD-10-PCS | Performed by: ORTHOPAEDIC SURGERY

## 2023-06-12 PROCEDURE — 97161 PT EVAL LOW COMPLEX 20 MIN: CPT | Mod: GP

## 2023-06-12 PROCEDURE — 250N000013 HC RX MED GY IP 250 OP 250 PS 637: Performed by: PHYSICIAN ASSISTANT

## 2023-06-12 PROCEDURE — 370N000017 HC ANESTHESIA TECHNICAL FEE, PER MIN: Performed by: ORTHOPAEDIC SURGERY

## 2023-06-12 PROCEDURE — 360N000077 HC SURGERY LEVEL 4, PER MIN: Performed by: ORTHOPAEDIC SURGERY

## 2023-06-12 PROCEDURE — 999N000141 HC STATISTIC PRE-PROCEDURE NURSING ASSESSMENT: Performed by: ORTHOPAEDIC SURGERY

## 2023-06-12 PROCEDURE — 258N000001 HC RX 258: Performed by: ORTHOPAEDIC SURGERY

## 2023-06-12 PROCEDURE — 250N000009 HC RX 250: Performed by: NURSE ANESTHETIST, CERTIFIED REGISTERED

## 2023-06-12 PROCEDURE — 120N000001 HC R&B MED SURG/OB

## 2023-06-12 PROCEDURE — 97530 THERAPEUTIC ACTIVITIES: CPT | Mod: GP

## 2023-06-12 PROCEDURE — 84132 ASSAY OF SERUM POTASSIUM: CPT | Performed by: ANESTHESIOLOGY

## 2023-06-12 DEVICE — POSTERIOR STABILIZED FEMORAL
Type: IMPLANTABLE DEVICE | Site: KNEE | Status: FUNCTIONAL
Brand: TRIATHLON

## 2023-06-12 DEVICE — UNIVERSAL TIBIAL BASEPLATE
Type: IMPLANTABLE DEVICE | Site: KNEE | Status: FUNCTIONAL
Brand: TRIATHLON

## 2023-06-12 DEVICE — PATELLA
Type: IMPLANTABLE DEVICE | Site: KNEE | Status: FUNCTIONAL
Brand: TRIATHLON

## 2023-06-12 DEVICE — TIBIAL BEARING INSERT - PS
Type: IMPLANTABLE DEVICE | Site: KNEE | Status: FUNCTIONAL
Brand: TRIATHLON

## 2023-06-12 RX ORDER — ACETAMINOPHEN 325 MG/1
975 TABLET ORAL EVERY 8 HOURS
Status: COMPLETED | OUTPATIENT
Start: 2023-06-12 | End: 2023-06-15

## 2023-06-12 RX ORDER — CELECOXIB 200 MG/1
400 CAPSULE ORAL ONCE
Status: COMPLETED | OUTPATIENT
Start: 2023-06-12 | End: 2023-06-12

## 2023-06-12 RX ORDER — HYDROXYZINE HYDROCHLORIDE 10 MG/1
10 TABLET, FILM COATED ORAL EVERY 6 HOURS PRN
Status: DISCONTINUED | OUTPATIENT
Start: 2023-06-12 | End: 2023-06-15 | Stop reason: HOSPADM

## 2023-06-12 RX ORDER — BISACODYL 10 MG
10 SUPPOSITORY, RECTAL RECTAL DAILY PRN
Status: DISCONTINUED | OUTPATIENT
Start: 2023-06-12 | End: 2023-06-15 | Stop reason: HOSPADM

## 2023-06-12 RX ORDER — ONDANSETRON 2 MG/ML
4 INJECTION INTRAMUSCULAR; INTRAVENOUS EVERY 30 MIN PRN
Status: DISCONTINUED | OUTPATIENT
Start: 2023-06-12 | End: 2023-06-12 | Stop reason: HOSPADM

## 2023-06-12 RX ORDER — NALOXONE HYDROCHLORIDE 0.4 MG/ML
0.4 INJECTION, SOLUTION INTRAMUSCULAR; INTRAVENOUS; SUBCUTANEOUS
Status: DISCONTINUED | OUTPATIENT
Start: 2023-06-12 | End: 2023-06-15 | Stop reason: HOSPADM

## 2023-06-12 RX ORDER — HYDROMORPHONE HCL IN WATER/PF 6 MG/30 ML
0.4 PATIENT CONTROLLED ANALGESIA SYRINGE INTRAVENOUS
Status: DISCONTINUED | OUTPATIENT
Start: 2023-06-12 | End: 2023-06-15 | Stop reason: HOSPADM

## 2023-06-12 RX ORDER — LABETALOL 20 MG/4 ML (5 MG/ML) INTRAVENOUS SYRINGE
PRN
Status: DISCONTINUED | OUTPATIENT
Start: 2023-06-12 | End: 2023-06-12

## 2023-06-12 RX ORDER — SODIUM CHLORIDE, SODIUM LACTATE, POTASSIUM CHLORIDE, CALCIUM CHLORIDE 600; 310; 30; 20 MG/100ML; MG/100ML; MG/100ML; MG/100ML
INJECTION, SOLUTION INTRAVENOUS CONTINUOUS
Status: DISCONTINUED | OUTPATIENT
Start: 2023-06-12 | End: 2023-06-15 | Stop reason: HOSPADM

## 2023-06-12 RX ORDER — TRANEXAMIC ACID 650 MG/1
1950 TABLET ORAL ONCE
Status: DISCONTINUED | OUTPATIENT
Start: 2023-06-12 | End: 2023-06-12 | Stop reason: HOSPADM

## 2023-06-12 RX ORDER — SODIUM CHLORIDE, SODIUM LACTATE, POTASSIUM CHLORIDE, CALCIUM CHLORIDE 600; 310; 30; 20 MG/100ML; MG/100ML; MG/100ML; MG/100ML
INJECTION, SOLUTION INTRAVENOUS CONTINUOUS
Status: DISCONTINUED | OUTPATIENT
Start: 2023-06-12 | End: 2023-06-12 | Stop reason: HOSPADM

## 2023-06-12 RX ORDER — CEFAZOLIN SODIUM 1 G/3ML
1 INJECTION, POWDER, FOR SOLUTION INTRAMUSCULAR; INTRAVENOUS EVERY 8 HOURS
Status: COMPLETED | OUTPATIENT
Start: 2023-06-12 | End: 2023-06-12

## 2023-06-12 RX ORDER — FENTANYL CITRATE 0.05 MG/ML
50 INJECTION, SOLUTION INTRAMUSCULAR; INTRAVENOUS EVERY 5 MIN PRN
Status: DISCONTINUED | OUTPATIENT
Start: 2023-06-12 | End: 2023-06-12 | Stop reason: HOSPADM

## 2023-06-12 RX ORDER — ACETAMINOPHEN 325 MG/1
975 TABLET ORAL ONCE
Status: COMPLETED | OUTPATIENT
Start: 2023-06-12 | End: 2023-06-12

## 2023-06-12 RX ORDER — FENTANYL CITRATE 50 UG/ML
INJECTION, SOLUTION INTRAMUSCULAR; INTRAVENOUS PRN
Status: DISCONTINUED | OUTPATIENT
Start: 2023-06-12 | End: 2023-06-12

## 2023-06-12 RX ORDER — OXYCODONE HYDROCHLORIDE 5 MG/1
10 TABLET ORAL EVERY 4 HOURS PRN
Status: DISCONTINUED | OUTPATIENT
Start: 2023-06-12 | End: 2023-06-14

## 2023-06-12 RX ORDER — HYDROMORPHONE HCL IN WATER/PF 6 MG/30 ML
0.2 PATIENT CONTROLLED ANALGESIA SYRINGE INTRAVENOUS EVERY 5 MIN PRN
Status: DISCONTINUED | OUTPATIENT
Start: 2023-06-12 | End: 2023-06-12 | Stop reason: HOSPADM

## 2023-06-12 RX ORDER — HYDROMORPHONE HYDROCHLORIDE 1 MG/ML
INJECTION, SOLUTION INTRAMUSCULAR; INTRAVENOUS; SUBCUTANEOUS PRN
Status: DISCONTINUED | OUTPATIENT
Start: 2023-06-12 | End: 2023-06-12

## 2023-06-12 RX ORDER — HYDROMORPHONE HCL IN WATER/PF 6 MG/30 ML
0.4 PATIENT CONTROLLED ANALGESIA SYRINGE INTRAVENOUS EVERY 5 MIN PRN
Status: DISCONTINUED | OUTPATIENT
Start: 2023-06-12 | End: 2023-06-12 | Stop reason: HOSPADM

## 2023-06-12 RX ORDER — NALOXONE HYDROCHLORIDE 0.4 MG/ML
0.2 INJECTION, SOLUTION INTRAMUSCULAR; INTRAVENOUS; SUBCUTANEOUS
Status: DISCONTINUED | OUTPATIENT
Start: 2023-06-12 | End: 2023-06-15 | Stop reason: HOSPADM

## 2023-06-12 RX ORDER — ONDANSETRON 4 MG/1
4 TABLET, ORALLY DISINTEGRATING ORAL EVERY 30 MIN PRN
Status: DISCONTINUED | OUTPATIENT
Start: 2023-06-12 | End: 2023-06-12 | Stop reason: HOSPADM

## 2023-06-12 RX ORDER — OXYCODONE HYDROCHLORIDE 5 MG/1
5 TABLET ORAL EVERY 4 HOURS PRN
Status: DISCONTINUED | OUTPATIENT
Start: 2023-06-12 | End: 2023-06-15 | Stop reason: HOSPADM

## 2023-06-12 RX ORDER — ATORVASTATIN CALCIUM 20 MG/1
20 TABLET, FILM COATED ORAL EVERY EVENING
Status: DISCONTINUED | OUTPATIENT
Start: 2023-06-12 | End: 2023-06-15 | Stop reason: HOSPADM

## 2023-06-12 RX ORDER — VANCOMYCIN HYDROCHLORIDE 1 G/20ML
INJECTION, POWDER, LYOPHILIZED, FOR SOLUTION INTRAVENOUS PRN
Status: DISCONTINUED | OUTPATIENT
Start: 2023-06-12 | End: 2023-06-12 | Stop reason: HOSPADM

## 2023-06-12 RX ORDER — DIPHENHYDRAMINE HCL 12.5MG/5ML
12.5 LIQUID (ML) ORAL EVERY 6 HOURS PRN
Status: DISCONTINUED | OUTPATIENT
Start: 2023-06-12 | End: 2023-06-15 | Stop reason: HOSPADM

## 2023-06-12 RX ORDER — CEFAZOLIN SODIUM/WATER 2 G/20 ML
2 SYRINGE (ML) INTRAVENOUS SEE ADMIN INSTRUCTIONS
Status: DISCONTINUED | OUTPATIENT
Start: 2023-06-12 | End: 2023-06-12 | Stop reason: HOSPADM

## 2023-06-12 RX ORDER — PROPOFOL 10 MG/ML
INJECTION, EMULSION INTRAVENOUS CONTINUOUS PRN
Status: DISCONTINUED | OUTPATIENT
Start: 2023-06-12 | End: 2023-06-12

## 2023-06-12 RX ORDER — ONDANSETRON 4 MG/1
4 TABLET, ORALLY DISINTEGRATING ORAL EVERY 6 HOURS PRN
Status: DISCONTINUED | OUTPATIENT
Start: 2023-06-12 | End: 2023-06-15 | Stop reason: HOSPADM

## 2023-06-12 RX ORDER — ONDANSETRON 2 MG/ML
4 INJECTION INTRAMUSCULAR; INTRAVENOUS EVERY 6 HOURS PRN
Status: DISCONTINUED | OUTPATIENT
Start: 2023-06-12 | End: 2023-06-15 | Stop reason: HOSPADM

## 2023-06-12 RX ORDER — ACETAMINOPHEN 325 MG/1
650 TABLET ORAL EVERY 4 HOURS PRN
Status: DISCONTINUED | OUTPATIENT
Start: 2023-06-15 | End: 2023-06-15 | Stop reason: HOSPADM

## 2023-06-12 RX ORDER — PROPOFOL 10 MG/ML
INJECTION, EMULSION INTRAVENOUS PRN
Status: DISCONTINUED | OUTPATIENT
Start: 2023-06-12 | End: 2023-06-12

## 2023-06-12 RX ORDER — METHOCARBAMOL 500 MG/1
500 TABLET, FILM COATED ORAL EVERY 6 HOURS PRN
Status: DISCONTINUED | OUTPATIENT
Start: 2023-06-12 | End: 2023-06-15 | Stop reason: HOSPADM

## 2023-06-12 RX ORDER — LIDOCAINE 40 MG/G
CREAM TOPICAL
Status: DISCONTINUED | OUTPATIENT
Start: 2023-06-12 | End: 2023-06-15 | Stop reason: HOSPADM

## 2023-06-12 RX ORDER — FAMOTIDINE 20 MG/1
20 TABLET, FILM COATED ORAL DAILY
Status: DISCONTINUED | OUTPATIENT
Start: 2023-06-12 | End: 2023-06-15 | Stop reason: HOSPADM

## 2023-06-12 RX ORDER — HYDROMORPHONE HCL IN WATER/PF 6 MG/30 ML
0.2 PATIENT CONTROLLED ANALGESIA SYRINGE INTRAVENOUS
Status: DISCONTINUED | OUTPATIENT
Start: 2023-06-12 | End: 2023-06-15 | Stop reason: HOSPADM

## 2023-06-12 RX ORDER — LIDOCAINE HYDROCHLORIDE 20 MG/ML
INJECTION, SOLUTION INFILTRATION; PERINEURAL PRN
Status: DISCONTINUED | OUTPATIENT
Start: 2023-06-12 | End: 2023-06-12

## 2023-06-12 RX ORDER — LISINOPRIL 20 MG/1
20 TABLET ORAL DAILY
Status: DISCONTINUED | OUTPATIENT
Start: 2023-06-12 | End: 2023-06-14

## 2023-06-12 RX ORDER — AMOXICILLIN 250 MG
1 CAPSULE ORAL 2 TIMES DAILY
Status: DISCONTINUED | OUTPATIENT
Start: 2023-06-12 | End: 2023-06-15 | Stop reason: HOSPADM

## 2023-06-12 RX ORDER — POLYETHYLENE GLYCOL 3350 17 G/17G
17 POWDER, FOR SOLUTION ORAL DAILY
Status: DISCONTINUED | OUTPATIENT
Start: 2023-06-13 | End: 2023-06-15 | Stop reason: HOSPADM

## 2023-06-12 RX ORDER — FENTANYL CITRATE 0.05 MG/ML
25 INJECTION, SOLUTION INTRAMUSCULAR; INTRAVENOUS EVERY 5 MIN PRN
Status: DISCONTINUED | OUTPATIENT
Start: 2023-06-12 | End: 2023-06-12 | Stop reason: HOSPADM

## 2023-06-12 RX ORDER — DEXAMETHASONE SODIUM PHOSPHATE 4 MG/ML
INJECTION, SOLUTION INTRA-ARTICULAR; INTRALESIONAL; INTRAMUSCULAR; INTRAVENOUS; SOFT TISSUE PRN
Status: DISCONTINUED | OUTPATIENT
Start: 2023-06-12 | End: 2023-06-12

## 2023-06-12 RX ORDER — CALCIUM CARBONATE 500 MG/1
500 TABLET, CHEWABLE ORAL 4 TIMES DAILY PRN
Status: DISCONTINUED | OUTPATIENT
Start: 2023-06-12 | End: 2023-06-15 | Stop reason: HOSPADM

## 2023-06-12 RX ORDER — PROCHLORPERAZINE MALEATE 5 MG
5 TABLET ORAL EVERY 6 HOURS PRN
Status: DISCONTINUED | OUTPATIENT
Start: 2023-06-12 | End: 2023-06-15 | Stop reason: HOSPADM

## 2023-06-12 RX ORDER — ONDANSETRON 2 MG/ML
INJECTION INTRAMUSCULAR; INTRAVENOUS PRN
Status: DISCONTINUED | OUTPATIENT
Start: 2023-06-12 | End: 2023-06-12

## 2023-06-12 RX ORDER — LIDOCAINE 40 MG/G
CREAM TOPICAL
Status: DISCONTINUED | OUTPATIENT
Start: 2023-06-12 | End: 2023-06-12 | Stop reason: HOSPADM

## 2023-06-12 RX ORDER — CEFAZOLIN SODIUM/WATER 2 G/20 ML
2 SYRINGE (ML) INTRAVENOUS
Status: COMPLETED | OUTPATIENT
Start: 2023-06-12 | End: 2023-06-12

## 2023-06-12 RX ORDER — BUPIVACAINE HYDROCHLORIDE 7.5 MG/ML
INJECTION, SOLUTION INTRASPINAL
Status: DISCONTINUED | OUTPATIENT
Start: 2023-06-12 | End: 2023-06-12

## 2023-06-12 RX ADMIN — Medication 2 G: at 07:42

## 2023-06-12 RX ADMIN — SODIUM CHLORIDE, POTASSIUM CHLORIDE, SODIUM LACTATE AND CALCIUM CHLORIDE: 600; 310; 30; 20 INJECTION, SOLUTION INTRAVENOUS at 13:23

## 2023-06-12 RX ADMIN — TRANEXAMIC ACID 1 G: 1 INJECTION, SOLUTION INTRAVENOUS at 10:10

## 2023-06-12 RX ADMIN — LABETALOL 20 MG/4 ML (5 MG/ML) INTRAVENOUS SYRINGE 5 MG: at 10:28

## 2023-06-12 RX ADMIN — FAMOTIDINE 20 MG: 20 TABLET ORAL at 14:37

## 2023-06-12 RX ADMIN — SENNOSIDES AND DOCUSATE SODIUM 1 TABLET: 50; 8.6 TABLET ORAL at 20:18

## 2023-06-12 RX ADMIN — DEXAMETHASONE SODIUM PHOSPHATE 10 MG: 4 INJECTION, SOLUTION INTRA-ARTICULAR; INTRALESIONAL; INTRAMUSCULAR; INTRAVENOUS; SOFT TISSUE at 07:57

## 2023-06-12 RX ADMIN — ACETAMINOPHEN 975 MG: 325 TABLET ORAL at 06:51

## 2023-06-12 RX ADMIN — MIDAZOLAM 1 MG: 1 INJECTION INTRAMUSCULAR; INTRAVENOUS at 07:23

## 2023-06-12 RX ADMIN — TRANEXAMIC ACID 1 G: 1 INJECTION, SOLUTION INTRAVENOUS at 07:45

## 2023-06-12 RX ADMIN — ATORVASTATIN CALCIUM 20 MG: 20 TABLET, FILM COATED ORAL at 20:18

## 2023-06-12 RX ADMIN — HYDROMORPHONE HYDROCHLORIDE 0.5 MG: 1 INJECTION, SOLUTION INTRAMUSCULAR; INTRAVENOUS; SUBCUTANEOUS at 10:54

## 2023-06-12 RX ADMIN — LIDOCAINE HYDROCHLORIDE 40 MG: 20 INJECTION, SOLUTION INFILTRATION; PERINEURAL at 07:57

## 2023-06-12 RX ADMIN — ONDANSETRON 4 MG: 2 INJECTION INTRAMUSCULAR; INTRAVENOUS at 07:57

## 2023-06-12 RX ADMIN — OXYCODONE HYDROCHLORIDE 5 MG: 5 TABLET ORAL at 16:08

## 2023-06-12 RX ADMIN — LABETALOL 20 MG/4 ML (5 MG/ML) INTRAVENOUS SYRINGE 5 MG: at 10:04

## 2023-06-12 RX ADMIN — CEFAZOLIN 1 G: 1 INJECTION, POWDER, FOR SOLUTION INTRAMUSCULAR; INTRAVENOUS at 14:44

## 2023-06-12 RX ADMIN — SENNOSIDES AND DOCUSATE SODIUM 1 TABLET: 50; 8.6 TABLET ORAL at 14:37

## 2023-06-12 RX ADMIN — CEFAZOLIN 1 G: 1 INJECTION, POWDER, FOR SOLUTION INTRAMUSCULAR; INTRAVENOUS at 23:01

## 2023-06-12 RX ADMIN — PHENYLEPHRINE HYDROCHLORIDE 0.2 MCG/KG/MIN: 10 INJECTION INTRAVENOUS at 08:14

## 2023-06-12 RX ADMIN — CELECOXIB 400 MG: 200 CAPSULE ORAL at 06:50

## 2023-06-12 RX ADMIN — PROPOFOL 50 MCG/KG/MIN: 10 INJECTION, EMULSION INTRAVENOUS at 07:57

## 2023-06-12 RX ADMIN — ACETAMINOPHEN 975 MG: 325 TABLET ORAL at 14:37

## 2023-06-12 RX ADMIN — PROPOFOL 20 MG: 10 INJECTION, EMULSION INTRAVENOUS at 08:13

## 2023-06-12 RX ADMIN — FENTANYL CITRATE 25 MCG: 50 INJECTION, SOLUTION INTRAMUSCULAR; INTRAVENOUS at 10:16

## 2023-06-12 RX ADMIN — Medication 15 ML: at 07:12

## 2023-06-12 RX ADMIN — SODIUM CHLORIDE, POTASSIUM CHLORIDE, SODIUM LACTATE AND CALCIUM CHLORIDE: 600; 310; 30; 20 INJECTION, SOLUTION INTRAVENOUS at 06:05

## 2023-06-12 RX ADMIN — FENTANYL CITRATE 25 MCG: 50 INJECTION, SOLUTION INTRAMUSCULAR; INTRAVENOUS at 09:25

## 2023-06-12 RX ADMIN — SODIUM CHLORIDE, POTASSIUM CHLORIDE, SODIUM LACTATE AND CALCIUM CHLORIDE: 600; 310; 30; 20 INJECTION, SOLUTION INTRAVENOUS at 09:33

## 2023-06-12 RX ADMIN — ACETAMINOPHEN 975 MG: 325 TABLET ORAL at 22:54

## 2023-06-12 RX ADMIN — FENTANYL CITRATE 25 MCG: 50 INJECTION, SOLUTION INTRAMUSCULAR; INTRAVENOUS at 10:22

## 2023-06-12 RX ADMIN — BUPIVACAINE HYDROCHLORIDE IN DEXTROSE 1.4 ML: 7.5 INJECTION, SOLUTION SUBARACHNOID at 07:45

## 2023-06-12 RX ADMIN — FENTANYL CITRATE 25 MCG: 50 INJECTION, SOLUTION INTRAMUSCULAR; INTRAVENOUS at 09:48

## 2023-06-12 ASSESSMENT — ENCOUNTER SYMPTOMS
DYSRHYTHMIAS: 1
SEIZURES: 0

## 2023-06-12 ASSESSMENT — ACTIVITIES OF DAILY LIVING (ADL)
ADLS_ACUITY_SCORE: 20
ADLS_ACUITY_SCORE: 20
ADLS_ACUITY_SCORE: 21
ADLS_ACUITY_SCORE: 20
ADLS_ACUITY_SCORE: 21
ADLS_ACUITY_SCORE: 20
ADLS_ACUITY_SCORE: 21
ADLS_ACUITY_SCORE: 20
ADLS_ACUITY_SCORE: 20

## 2023-06-12 ASSESSMENT — LIFESTYLE VARIABLES: TOBACCO_USE: 0

## 2023-06-12 NOTE — BRIEF OP NOTE
[facility]    Brief Operative Note    Pre-operative diagnosis: Primary osteoarthritis of left knee [M17.12]  Post-operative diagnosis Same as pre-operative diagnosis    Procedure: Procedure(s):  LEFT TOTAL KNEE ARTHROPLASTY  Surgeon: Surgeon(s) and Role:     * Jitenrda Blas MD - Primary     * Conor Harmon - Assisting  Anesthesia: Spinal   Estimated Blood Loss: 25 mL from 6/12/2023  7:41 AM to 6/12/2023 11:08 AM    Plan: Admit (3 points in preoperative admission criteria: age>85, A-fib on Xarelto)  SNF upon discharge  Lovenox POD #1 and then resume Xarelto POD #2      Drains: None  Specimens: * No specimens in log *  Findings:   None.  Complications: None.  Implants:   Implant Name Type Inv. Item Serial No.  Lot No. LRB No. Used Action   IMP COMP FEM STRK TRIATHLN PS LT 5 5515-F-501 - [id number] Total Joint Component/Insert IMP COMP FEM STRK TRIATHLN PS LT 5 5515-F-501  [employer] [id number] Left 1 Implanted   IMP INSERT BASEPLATE TIBIAL HOWM TRI 4 5521-B-400 - [id number] Total Joint Component/Insert IMP INSERT BASEPLATE TIBIAL HOWM TRI 4 5521-B-400  [employer] [id number] Left 1 Implanted   IMP COMP PATELLA HOWM TRI ASYM [id number] 555-L-299 - [id number] Total Joint Component/Insert IMP COMP PATELLA HOWM TRI ASYM [id number] 555-L-299  [employer] [id number] Left 1 Implanted   IMP INSERT TIBIAL STRK TRI SIZE 4 11MM [id number]-[id number] - [id number] Total Joint Component/Insert IMP INSERT TIBIAL STRK TRI SIZE 4 11MM [id number]  [employer] [id number] Left 1 Implanted

## 2023-06-12 NOTE — INTERVAL H&P NOTE
"I have reviewed the surgical (or preoperative) H&P that is linked to this encounter, and examined the patient. There are no significant changes    Clinical Conditions Present on Arrival:  Clinically Significant Risk Factors Present on Admission                # Drug Induced Coagulation Defect: home medication list includes an anticoagulant medication   # Overweight: Estimated body mass index is 27.29 kg/m  as calculated from the following:    Height as of this encounter: 1.626 m (5' 4\").    Weight as of this encounter: 72.1 kg (159 lb).       "

## 2023-06-12 NOTE — ANESTHESIA PREPROCEDURE EVALUATION
Anesthesia Pre-Procedure Evaluation    Patient: Aileen Raygoza   MRN: [id number] : 1935        Procedure : Procedure(s):  LEFT TOTAL KNEE ARTHROPLASTY          Past Medical History:   Diagnosis Date     Benign essential hypertension      Impaired fasting glucose      PAF (paroxysmal atrial fibrillation) (H)      Pure hypercholesterolemia       Past Surgical History:   Procedure Laterality Date     COLONOSCOPY N/A 2020    Procedure: COLONOSCOPY, WITH POLYPECTOMY AND BIOPSY;  Surgeon: Kelsea Cabezas MD;  Location:  GI     D & C      SAB     TONSILLECTOMY & ADENOIDECTOMY        Allergies   Allergen Reactions     Simvastatin Muscle Pain (Myalgia)      Social History     Tobacco Use     Smoking status: Never     Smokeless tobacco: Never   Vaping Use     Vaping status: Never Used   Substance Use Topics     Alcohol use: Not Currently      Wt Readings from Last 1 Encounters:   23 72.1 kg (159 lb)        Anesthesia Evaluation            ROS/MED HX  ENT/Pulmonary:    (-) tobacco use, asthma and sleep apnea   Neurologic:    (-) no seizures and no CVA   Cardiovascular:     (+) Dyslipidemia hypertension-----Taking blood thinners (xarelto) dysrhythmias (paroxysmal), a-fib,     METS/Exercise Tolerance: >4 METS    Hematologic:       Musculoskeletal:   (+) arthritis,     GI/Hepatic:    (-) GERD and liver disease   Renal/Genitourinary:    (-) renal disease   Endo:    (-) Type II DM and thyroid disease   Psychiatric/Substance Use:       Infectious Disease:       Malignancy:       Other:               OUTSIDE LABS:  CBC:   Lab Results   Component Value Date    WBC 5.3 2023    WBC 5.4 2021    HGB 14.0 2023    HGB 13.4 2021    HCT 44.5 2023    HCT 43.1 2021     2023     2021     BMP:   Lab Results   Component Value Date     2023     2023    POTASSIUM 4.3 2023    POTASSIUM 4.0 2023    CHLORIDE 105 2023     CHLORIDE 105 02/14/2023    CO2 25 06/05/2023    CO2 22 02/14/2023    BUN 10.8 06/05/2023    BUN 15.0 02/14/2023    CR 0.64 06/05/2023    CR 0.68 02/14/2023     (H) 06/12/2023     (H) 06/05/2023     COAGS: No results found for: PTT, INR, FIBR  POC: No results found for: BGM, HCG, HCGS  HEPATIC:   Lab Results   Component Value Date    ALBUMIN 4.2 06/05/2023    PROTTOTAL 6.7 06/05/2023    ALT 16 06/05/2023    AST 21 06/05/2023    ALKPHOS 97 06/05/2023    BILITOTAL 0.7 06/05/2023     OTHER:   Lab Results   Component Value Date    A1C 6.0 (H) 02/14/2023    NITESH 9.3 06/05/2023    TSH 2.29 03/04/2014       Anesthesia Plan    ASA Status:  2   NPO Status:  NPO Appropriate    Anesthesia Type: Spinal.   Induction: N/a.   Maintenance: TIVA.        Consents    Anesthesia Plan(s) and associated risks, benefits, and realistic alternatives discussed. Questions answered and patient/representative(s) expressed understanding.    - Discussed:     - Discussed with:  Patient         Postoperative Care    Pain management: Peripheral nerve block (Single Shot), Multi-modal analgesia.   PONV prophylaxis: Ondansetron (or other 5HT-3)     Comments:                Luis Lowry MD

## 2023-06-12 NOTE — PROGRESS NOTES
06/12/23 1500   Appointment Info   Signing Clinician's Name / Credentials (PT) TONI Villarreal   Student Supervision Direct supervision provided;Line of sight supervision provided;Direct Patient Contact Provided;Therapy services provided with the co-signing licensed therapist guiding and directing the services, and providing the skilled judgement and assessment throughout the session  (Sandra Ortiz, PT, DPT)   Living Environment   People in Home alone   Current Living Arrangements apartment   Home Accessibility no concerns   Transportation Anticipated family or friend will provide   Living Environment Comments Pt live in a co-op apartment, uses elevator.   Self-Care   Usual Activity Tolerance moderate   Current Activity Tolerance fair   Regular Exercise No   Equipment Currently Used at Home none   Fall history within last six months no   Activity/Exercise/Self-Care Comment Pt independent with all ADLs and IADLs at baseline.   General Information   Onset of Illness/Injury or Date of Surgery 06/12/23   Referring Physician Jitendra Blas MD   Patient/Family Therapy Goals Statement (PT) Pt plans to DC to TCU and then return to home when able.   Pertinent History of Current Problem (include personal factors and/or comorbidities that impact the POC) Pt is an 88 y.o women s/p L TKA POD#0 on 06/12/23   Existing Precautions/Restrictions fall;weight bearing   Weight-Bearing Status - LLE weight-bearing as tolerated   Cognition   Affect/Mental Status (Cognition) WFL   Orientation Status (Cognition) oriented x 4   Follows Commands (Cognition) WFL   Pain Assessment   Patient Currently in Pain   (5/10)   Integumentary/Edema   Integumentary/Edema Comments Dressing on L knee appears CDI.   Posture    Posture Protracted shoulders   Range of Motion (ROM)   ROM Comment Pt ROM grossly WFL for BUE and RLE with functional transfers, LLE limited due to post op status and pain.   Strength (Manual Muscle Testing)   Strength  (Manual Muscle Testing) Able to perform R SLR;Able to perform L SLR;Deficits observed during functional mobility   Strength Comments Grossly antigravity strength in BUE and BLE with functional transfers.   Bed Mobility   Comment, (Bed Mobility) Pt supine>sit EOB, CGA.   Transfers   Comment, (Transfers) Pt sit>stand, bed to FWW, CGA.   Gait/Stairs (Locomotion)   Distance in Feet 5' eval 75' treatment   Distance in Feet (Gait) 75'   Comment, (Gait/Stairs) Pt amb with FWW, CGA, step to pattern, decreased step length, decreased lashonda   Balance   Balance Comments Pt able to maintain seated balance EOB with hands for assist. Pt able to maintain standing balance with FWW, no LOB.   Sensory Examination   Sensory Perception Comments   (numbness/tingling present in both feet, pt reports numbness in pelvic area.)   Clinical Impression   Criteria for Skilled Therapeutic Intervention Yes, treatment indicated   PT Diagnosis (PT) Impaired gait and functional mobility.   Influenced by the following impairments Pain, decreased strength and ROM.   Functional limitations due to impairments Pain, fall risk, impaired functional independence, impaired ADLs and IADLs.   Clinical Presentation (PT Evaluation Complexity) Stable/Uncomplicated   Clinical Presentation Rationale Per MR and clinical judgement   Clinical Decision Making (Complexity) low complexity   Planned Therapy Interventions (PT) bed mobility training;gait training;home exercise program;patient/family education;ROM (range of motion);strengthening;stretching;cryotherapy;transfer training;progressive activity/exercise   Risk & Benefits of therapy have been explained evaluation/treatment results reviewed;care plan/treatment goals reviewed;risks/benefits reviewed;current/potential barriers reviewed;participants voiced agreement with care plan;participants included;patient   PT Total Evaluation Time   PT Eval, Low Complexity Minutes (50212) 7   Plan of Care Review   Plan of Care  Reviewed With patient;spouse   Physical Therapy Goals   PT Frequency Daily   PT Predicted Duration/Target Date for Goal Attainment 06/17/23   PT Goals Bed Mobility;Transfers;Gait   PT: Bed Mobility Modified independent;Supine to/from sit   PT: Transfers Sit to/from stand;Within precautions;Modified independent;Assistive device   PT: Gait Assistive device;Rolling walker;150 feet;Within precautions;Modified independent   Interventions   Interventions Quick Adds Gait Training;Therapeutic Activity;Therapeutic Procedure   Therapeutic Procedure/Exercise   Ther. Procedure: strength, endurance, ROM, flexibillity Minutes (58380) 9   Symptoms Noted During/After Treatment increased pain;fatigue   Treatment Detail/Skilled Intervention Pt performed the following exercises for LE strengthening and ROM. Pt instructed on and performed 10 reps of the following exercises on L leg: APs, SAQ, quad sets, glute sets, heel slides. Pt able to teach back exercises to [facility] on the importance of movement and strengthening for post-op recovery. Pt tolerated well.   Therapeutic Activity   Therapeutic Activities: dynamic activities to improve functional performance Minutes (35628) 11   Treatment Detail/Skilled Intervention Pt agreed to therapy session. Pt laying supine in bed, HOB elevated upon start of session. increased time for room setup and line management. Demo for pt STS technique. Pt performed additional sit>stand with FWW.  pt performed stand>sit in recliner chair CGA following bout of amb. Pt able to reposition self in recliner chair, chair alarm on, ice placed on L knee, heels floated, all needs in reach. RN aware pt up in chair.   Gait Training   Gait Training Minutes (91106) 6   Symptoms Noted During/After Treatment (Gait Training) fatigue;increased pain   Treatment Detail/Skilled Intervention pt amb in room to hallway, single bout of amb, no LOB.  Cues for upright gaze and posture, cues for walker proximity. Pt  tolerated well. Encouraged pt to amb with nursing staff additional times today.   [place] Level (Gait Training) contact guard   Physical Assistance Level (Gait Training) set-up required;supervision;verbal cues;nonverbal cues (demo/gestures);1 person assist   Weight Bearing (Gait Training) weight-bearing as tolerated   Assistive Device (Gait Training) rolling walker   Gait Analysis Deviations decreased lashonda;decreased step length;decreased stride length   PT Discharge Planning   PT Plan Increase distance of amb, review HEP, repeated STS   PT Rationale for DC Rec Pt tolerated session well. Anticipate pt will require assist as needed, use of walker for ambulation at time of discharge. Per ortho, pt plans to DC to TCU, as pt lives alone.   PT Brief overview of current status supine to sit EOB, CGA. Sit to stand to FWW and amb with FWW, CGA.   Total Session Time   Timed Code Treatment Minutes 26   Total Session Time (sum of timed and untimed services) 33

## 2023-06-12 NOTE — ANESTHESIA PROCEDURE NOTES
"Intrathecal Procedure Note    Pre-Procedure   Staff -        Anesthesiologist:  Luis Lowry MD       Performed By: Anesthesiologist       Location: OR       Pre-Anesthestic Checklist: patient identified, IV checked, site marked, risks and benefits discussed, informed consent, monitors and equipment checked, pre-op evaluation and at physician/surgeon's request  Timeout:       Correct Patient: Yes        Correct Procedure: Yes        Correct Site: Yes        Correct Position: Yes   Procedure Documentation  Procedure: intrathecal       Patient Position: sitting       Patient Prep/Sterile Barriers: sterile gloves, mask, patient draped       Skin prep: Betadine       Insertion Site: L3-4. (midline approach).       Needle Gauge: 24.        Needle Length (Inches): 4        Spinal Needle Type: Pencan       Introducer used       Introducer: 20 G       # of attempts: 3 and  # of redirects:  2    Assessment/Narrative         Paresthesias: No.       CSF fluid: clear.       Opening pressure was cmH2O while  Sitting.      Medication(s) Administered   0.75% Hyperbaric Bupivacaine (Intrathecal) - Intrathecal   1.4 mL - 6/12/2023 7:45:00 AM   Comments:  Patient sitting on edge of OR bed, lower back cleaned and prepped in sterile fashion with betadine. 1% lido used to numb area. Introducer placed, spinal needle through introducer. Appropriate flow of CSF and confirmed with aspiration via syringe. Spinal dose given, 10.5 mg 0.75% bupivacaine with dextrose. No complications.       FOR [facility] ([facility]) ONLY:   Pain Team Contact information: please page the Pain Team Via [employer]. Search \"Pain\". During daytime hours, please page the attending first. At night please page the resident first.      "

## 2023-06-12 NOTE — OP NOTE
Aileen Raygoza  MRN# [id number]    YOB: 1935  AGE: 88 year old    DATE OF PROCEDURE: 6/12/2023     SURGEON:  Jitendra Blas MD     FIRST ASSISTANT:  Conor Harmon orthopedic assistant     PREOPERATIVE DIAGNOSIS:  Left knee degenerative joint disease    POSTOPERATIVE DIAGNOSIS:  Left knee degenerative joint disease     PROCEDURE:  Left total knee arthroplasty    IMPLANTS:  Fontanelle Triathlon, 5 PS femur, 4 universal tibia, 29 mm asymmetric patella, 11 mm PS polyethylene     INDICATIONS:  Aileen Raygoza is an 88 year old woman with a 20 year history of progressive left knee pain secondary to severe underlying degenerative arthritis.  X-rays reveal severe  bone-on-bone  degenerative changes with associated varus deformity.  Symptoms have been refractory to an extensive conservative treatment approach and now significantly interfere with activities of daily living and overall quality of life.     Indications, risks, goals, and expectations of total knee arthroplasty were reviewed in detail preoperatively.      DESCRIPTION OF PROCEDURE:  Following induction of adductor canal femoral nerve block as well as spinal anesthesia, the left lower extremity was prepped and draped in the usual sterile fashion.  A  timeout  was performed and the left knee was confirmed as the appropriate operative site.  The leg was exsanguinated with an Esmarch bandage and the tourniquet inflated to 275 mmHg.     A linear incision was made centered over the medial one-third of the patella.  Full thickness skin flaps were elevated above the extensor mechanism.  A mid vastus approach was performed.  Medial parapatellar arthrotomy was performed with electrocautery.  This was extended distally to the medial border of the tibial tubercle and proximally into the mid portion of the vastus medialis, in line with the muscle fibers. The deep medial collateral ligament was elevated sharply off the medial tibial plateau.  A portion of the  retropatellar fat pad was excised to mobilize the extensor mechanism. Synovectomy was performed in the suprapatellar pouch.  The patella was then everted and the knee flexed.  Inspection revealed severe tricompartmental degenerative changes.  Peripheral and central osteophytes were removed with a rongeur.      A  hole was drilled in the distal femur, 1 cm anterior to the PCL origin. The intramedullary alignment system was used (with the flexible intramedullary antonina) for preparation of the distal femur. The distal femoral cutting block was pinned to resect 9  mm of bone in 5 degrees of valgus.  A smooth flat cut was achieved.  The femoral sizing guide was pinned to the distal femur in 3 degrees of external rotation and confirmed parallel to the epicondylar axis.  The femur was sized to a #5.  The AP cutting block was pinned and again confirmed parallel to the epicondylar access.  The anterior, posterior, chamfer cuts were performed in sequence with care to prevent any anterior cortical notching and care to protect the collateral ligaments.      Attention was turned to the tibia.  The extramedullary tibial alignment guide was used for preparation of the proximal tibia.  A conservative tibial resection was performed.  2 mm was resected from the deficient medial side, resulting in a 9 mm resection from the high lateral side.   A smooth flat cut was achieved.  The tibia was sized to a #4.  Posterior cruciate ligament was excised.  The posterior osteophytes were removed from the distal femur using a curved osteotome and pituitary rongeur. The flexion and extension gaps were then assessed and found to be equal and rectangular. Provisional trial reduction was performed using a #5 CR femur, #4 tibia, and 11 mm trial polyethylene.     Attention was turned to the patella.  The patella measured 22 mm in thickness.  A conservative patellar resection was preformed using the patellar cutting clamp followed by a freehand  technique leaving a residual 14 mm thick patella.  Three drill holes were made in the appropriate position for the 29 mm asymmetric patella.  Final patellar thickness with the patellar component in place was 23 mm.  There was anatomic patellar tracking with no tendency for subluxation or tilt.      The intercondylar notch osteotomy guide was then pinned to the distal femur and the intercondylar osteotomy was performed with a reciprocating saw.  Trial reduction was now performed with a PS femoral component.  There was excellent stability throughout range of motion. The knee achieved full extension with good collateral ligament stability in extension. The knee flexes to 120 degrees with good AP stability at 90 degrees. The MCL tension test was performed with a Traylor elevator and there is good MCL stability at 90 degrees. Rotational alignment of the tibial tray was marked with electrocautery, centered over the medial one-third of the tibial tubercle.     The tibial tray was then pinned in anatomic rotational alignment.  The proximal tibia was prepared with the drill and punch. Bony surfaces were prepared with pulsatile lavage.  Local anesthetic cocktail was then injected into the posterior capsule and periarticular soft tissues to assist with postoperative pain control.      Three components were cemented in sequence beginning with the #4 universal tibia followed by the #5 PS femur, and finally the 29 mm asymmetric patella.  A 11 mm trial polyethylene was placed in the knee and the knee was held in full extension until the cement was hard.  A dilute Betadine irrigation was performed during the cement hardening process.  Once the cement was hard, the 11 mm polyethylene was confirmed as the appropriate thickness.  The trial polyethylene was removed and careful inspection was performed to ensure that all extruded cement was removed from the knee.  The knee was copiously irrigated with normal saline using pulsatile lavage.  The real 11 mm posterior stabilized polyethylene was impacted onto the tibial tray and the knee was reduced.  The knee was stable throughout range of motion.      Tourniquet was then deflated at 96 minutes.  Hemostasis was achieved with electrocautery.  Tranexamic acid 1 gm IV was given preoperatively and a 2nd gram given at the time of tourniquet release. There was minimal postoperative bleeding.      The wound was closed in layers with interrupted #1 Vicryl in the deep VMO fascia, interrupted #1 Ethibond x3 at the VMO tendon insertion, running #2 PDO Stratafix in the remainder of the medial parapatellar retinaculum including superficial VMO fascia.  Subcutaneous tissues were closed with interrupted 2-0 Vicryl followed by running 2-0 PDO Stratafix in the subcuticular layer and finally skin staples.  A sterile dressing was applied with an Ace bandage from the toes to the groin.     COMPLICATIONS:   None known    ESTIMATED BLOOD LOSS:  25 mL    ANTIBOITICS:   Ancef 2 grams IV were given preoperatively, 20 minutes prior to the time of the incision.  This will be continued for 2 postoperative doses, i.e. to be discontinued within less than 24 hours from the time of surgery.  Tobramycin 1 gram per batch of cement was used during the prosthesis implantation. Vancomycin powder 1 gm was placed in the wound at the time of closure.    DEEP VEIN THROMBOSIS PROPHYLAXIS:  1. Rapid mobilization, weight bearing as tolerated.  2. SCDs full time while in bed.  3. Lovenox on POD #1, then resume Xarelto on POD #2.     DISPOSITION:   The patient returned to the PACU in stable condition.

## 2023-06-12 NOTE — ANESTHESIA POSTPROCEDURE EVALUATION
Patient: Aileen Raygoza    Procedure: Procedure(s):  LEFT TOTAL KNEE ARTHROPLASTY       Anesthesia Type:  Spinal    Note:  Disposition: Inpatient   Postop Pain Control: Uneventful            Sign Out: Well controlled pain   PONV: No   Neuro/Psych: Uneventful            Sign Out: Acceptable/Baseline neuro status   Airway/Respiratory: Uneventful            Sign Out: Acceptable/Baseline resp. status   CV/Hemodynamics: Uneventful            Sign Out: Acceptable CV status; No obvious hypovolemia; No obvious fluid overload   Other NRE: NONE   DID A NON-ROUTINE EVENT OCCUR? No    Event details/Postop Comments:  Spinal level regressing appropriately.  Moving bilateral lower extremities. Pain well controlled.           Last vitals:  Vitals Value Taken Time   /97 06/12/23 1204   Temp 36.6  C (97.9  F) 06/12/23 1145   Pulse 96 06/12/23 1217   Resp 14 06/12/23 1217   SpO2 96 % 06/12/23 1218   Vitals shown include unvalidated device data.    Electronically Signed By: Luis Lowry MD  June 12, 2023  3:04 PM

## 2023-06-12 NOTE — ANESTHESIA PROCEDURE NOTES
"Adductor canal and Femoral Procedure Note    Pre-Procedure   Staff -        Anesthesiologist:  Luis Lowry MD       Performed By: Anesthesiologist       Location: pre-op       Pre-Anesthestic Checklist: patient identified, IV checked, site marked, risks and benefits discussed, informed consent, monitors and equipment checked, pre-op evaluation, at physician/surgeon's request and post-op pain management  Timeout:       Correct Patient: Yes        Correct Procedure: Yes        Correct Site: Yes        Correct Position: Yes        Correct Laterality: Yes        Site Marked: Yes  Procedure Documentation  Procedure: Adductor canal, Femoral       Laterality: left       Patient Position: supine       Patient Prep/Sterile Barriers: sterile gloves, mask, \"No-touch\" technique       Skin prep: Chloraprep       Local skin infiltrated with 3 mL of 1% lidocaine.        Insertion site: upper, medial thigh.       Needle Type: insulated       Needle Gauge: 22.        Needle Length (millimeters): 90        Ultrasound guided (permanent image entered into patient's record)       1. Ultrasound was used to identify targeted nerve, plexus, vascular marker, or fascial plane and place a needle adjacent to it in real-time.       2. Ultrasound was used to visualize the spread of anesthetic in close proximity to the above referenced structure.       3. A permanent image is entered into the patient's record.       4. The visualized anatomic structures appeared normal.       5. There were no apparent abnormal pathologic findings.    Assessment/Narrative         The placement was negative for: blood aspirated, painful injection and site bleeding       Paresthesias: No.       Test dose of mL at.         Test dose negative, 3 minutes after injection, for signs of intravascular, subdural, or intrathecal injection.       Bolus given via needle..        Secured via.        Insertion/Infusion Method: Single Shot       Complications: none      " " Injection made incrementally with aspirations every 5 mL.    Medication(s) Administered   Bupivacaine 0.5% w/ 1:400K Epi (Injection) - Injection   15 mL - 6/12/2023 7:12:00 AM   Comments:  15 cc of 0.5% Bupivacaine with epinephrine (1:400K) injected on the anterior side of the femoral artery, in close proximity to the femoral nerve branches via the adductor canal approach.    Pt tolerated well.    No complications.      Ultrasound Interpretation, Peripheral Nerve Block    1. Under ultrasound guidance, the needle was inserted and placed in close proximity to the target nerve(s).  2. Ultrasound was also used to visualize the spread of the anesthetic in close proximity to the nerve(s) being blocked.  Local anesthetic was administered in incremental doses, with intermittent negative aspiration.    3. The nerve(s) appeared anatomically normal.  4. There were no apparent abnormal pathological findings.  5. A permanent ultrasound image was saved in the patient's record.    The surgeon has given a verbal order transferring care of this patient to me for the performance of a regional analgesia block for post-op pain control. It is requested of me because I am uniquely trained and qualified to perform this block and the surgeon is neither trained nor qualified to perform this procedure.    Luis Lowry MD   3:03 PM          FOR [facility] ([facility]) ONLY:   Pain Team Contact information: please page the Pain Team Via sougou. Search \"Pain\". During daytime hours, please page the attending first. At night please page the resident first.      "

## 2023-06-12 NOTE — PLAN OF CARE
Goal Outcome Evaluation:       Pt arrived to floor from pacu at around 1230. A&OX4. VSS on 2L o2. Up AO1 with GBW. Was able to void. States that she has some tingling to bottom of feet. Pain increased after ambulation to BR and working with therapy. No N/V, SOB. LR running at 100ml/hr. Discharge pending.

## 2023-06-12 NOTE — ANESTHESIA CARE TRANSFER NOTE
Patient: Aileen Raygoza    Procedure: Procedure(s):  LEFT TOTAL KNEE ARTHROPLASTY       Diagnosis: Primary osteoarthritis of left knee [M17.12]  Diagnosis Additional Information: No value filed.    Anesthesia Type:   Spinal     Note:    Oropharynx: oropharynx clear of all foreign objects  Level of Consciousness: drowsy  Oxygen Supplementation: face mask  Level of Supplemental Oxygen (L/min / FiO2): 6  Independent Airway: airway patency satisfactory and stable  Dentition: dentition unchanged  Vital Signs Stable: post-procedure vital signs reviewed and stable  Report to RN Given: handoff report given  Patient transferred to: PACU  Comments: To PACU: Arouses easily, good airway, 02 face mask, VSS  Report to RN  Handoff Report: Identifed the Patient, Identified the Reponsible Provider, Reviewed the pertinent medical history, Discussed the surgical course, Reviewed Intra-OP anesthesia mangement and issues during anesthesia, Set expectations for post-procedure period and Allowed opportunity for questions and acknowledgement of understanding      Vitals:  Vitals Value Taken Time   /83 06/12/23 1113   Temp     Pulse 89 06/12/23 1116   Resp 17 06/12/23 1116   SpO2 99 % 06/12/23 1116   Vitals shown include unvalidated device data.    Electronically Signed By: JACKI Schwarz CRNA  June 12, 2023  11:17 AM  
Initiate Treatment: Doxycycline 100mg twice daily x 1 month.
Plan: Still waiting for the culture results to come back
Detail Level: Detailed
- Follow up with Cardiology in office within 1-2 weeks, contact information has been provided for you   - Continue to take our daily medications as instructed   - start taking Lipitor 80mg and aspirin 81mg daily which has been sent to your pharmacy   - you have been provided with a copy of all of your results   - Follow up with your PCP within 1 week   - Follow up with your neurologist within 1 week   - Follow up with neurosurgeon within 1 week for saccular aneurysm      Seguimiento con Cardiología en el consultorio dentro de 1-2 semanas, se le proporcionó la información de contacto  - Continuar tomando nuestros medicamentos diarios según las instrucciones.  - comience a mary Lipitor 80 mg y aspirina 81 mg al día, que se le envió a burnette farmacia  - se le ha proporcionado camille copia de todos aurora resultados  - Seguimiento con burnette PCP dentro de 1 semana  - Fabi un seguimiento con burnette neurólogo dentro de 1 semana  - Seguimiento con neurocirujano dentro de 1 semana para aneurisma sacular    Mareo    El mareo puede manifestarse christopher camille sensación de inestabilidad o aturdimiento. Puede sentir que está a punto de desmayarse. Esta afección puede ser causada por varios factores, incluidos medicamentos, deshidratación o enfermedad. Prisca suficiente líquido para mantener la orina lauren o de color amarillo pálido. No prisca alcohol y limite burnette consumo de cafeína. Evite los movimientos rápidos o bruscos. Levántese lentamente de las megan y manténgase firme hasta que se sienta wayne. Por la mañana, winnie siéntese en el borde de la cama.    BUSQUE ATENCIÓN MÉDICA INMEDIATA SI TIENE ALGUNO DE LOS SIGUIENTES SÍNTOMAS: vómitos, cambios en burnette visión o habla, debilidad en aurora brazos o piernas, dificultad para hablar o tragar, dolor en el pecho, dolor abdominal, dificultad para respirar, sudoración, sangrado, dolor de jun, dolor de paul o fiebre.    Dizziness    Dizziness can manifest as a feeling of unsteadiness or light-headedness. You may feel like you are about to faint. This condition can be caused by a number of things, including medicines, dehydration, or illness. Drink enough fluid to keep your urine clear or pale yellow. Do not drink alcohol and limit your caffeine intake. Avoid quick or sudden movements.  Rise slowly from chairs and steady yourself until you feel okay. In the morning, first sit up on the side of the bed.    SEEK IMMEDIATE MEDICAL CARE IF YOU HAVE ANY OF THE FOLLOWING SYMPTOMS: vomiting, changes in your vision or speech, weakness in your arms or legs, trouble speaking or swallowing, chest pain, abdominal pain, shortness of breath, sweating, bleeding, headache, neck pain, or fever.
Samples Given: CLN  shampoo contact time

## 2023-06-12 NOTE — CONSULTS
"[facility]  Consult Note - Hospitalist Service  Date of Admission:  6/12/2023  Consult Requested by: Dr. Blas  Reason for Consult: Medical Management     Assessment & Plan   Aileen Raygoza is a 88 year old female admitted on 6/12/2023. She has a PMHx of pAF, HTN, dyslipidemia.     Osteoarthritis of Left Knee s/p left total knee arthroplasty  Surgery completed under spinal anesthesia, EBL 25cc. Surgeon Dr. Blas. Pt received 1300 ml crystalloid.   - DVT prophylaxis - Lovenox on POD #1, then resume Xarelto on POD #2.   - plan for formal therapy eval, and discharge to SNF per ortho notes      HTN  Dyslipidemia  Well controlled on monotherapy (linisopril).   - restart lisinopril tomorrow after BMP (Hospitalist to chart check BP and BMP)  - continue statin    Paroxysmal Atrial Fibrillation   Anticoagulated on Xarelto, no rate control.   - DVT prophylaxis plan determined by PCP  - plan for outpatient discussion with PCP to risk stratify bleeding risk w/ risk of cardiovascular event in the setting of afib      Medications were reconciled for admit/discharge, Hospitalist service will chart check BMP and BP on POD#1 but will defer formal consult due to stability of chronic medical issues.        Clinically Significant Risk Factors Present on Admission               # Drug Induced Coagulation Defect: home medication list includes an anticoagulant medication    # Hypertension: Noted on problem list      # Overweight: Estimated body mass index is 27.29 kg/m  as calculated from the following:    Height as of this encounter: 1.626 m (5' 4\").    Weight as of this encounter: 72.1 kg (159 lb).            JACKI [facility]  Hospitalist Service  Securely message with [employer] (more info)  Text page via [facility] Paging/Directory     "

## 2023-06-13 ENCOUNTER — APPOINTMENT (OUTPATIENT)
Dept: PHYSICAL THERAPY | Facility: CLINIC | Age: 88
DRG: 470 | End: 2023-06-13
Attending: ORTHOPAEDIC SURGERY
Payer: MEDICARE

## 2023-06-13 LAB
GLUCOSE BLDC GLUCOMTR-MCNC: 106 MG/DL (ref 70–99)
HGB BLD-MCNC: 12.1 G/DL (ref 11.7–15.7)

## 2023-06-13 PROCEDURE — 250N000011 HC RX IP 250 OP 636: Performed by: ORTHOPAEDIC SURGERY

## 2023-06-13 PROCEDURE — 97110 THERAPEUTIC EXERCISES: CPT | Mod: GP

## 2023-06-13 PROCEDURE — 36415 COLL VENOUS BLD VENIPUNCTURE: CPT | Performed by: ORTHOPAEDIC SURGERY

## 2023-06-13 PROCEDURE — 250N000013 HC RX MED GY IP 250 OP 250 PS 637: Performed by: ORTHOPAEDIC SURGERY

## 2023-06-13 PROCEDURE — 97116 GAIT TRAINING THERAPY: CPT | Mod: GP

## 2023-06-13 PROCEDURE — 120N000001 HC R&B MED SURG/OB

## 2023-06-13 PROCEDURE — 85018 HEMOGLOBIN: CPT | Performed by: ORTHOPAEDIC SURGERY

## 2023-06-13 PROCEDURE — 250N000013 HC RX MED GY IP 250 OP 250 PS 637: Performed by: NURSE PRACTITIONER

## 2023-06-13 RX ORDER — ACETAMINOPHEN 325 MG/1
650 TABLET ORAL EVERY 4 HOURS PRN
Qty: 100 TABLET | Refills: 0 | Status: SHIPPED | OUTPATIENT
Start: 2023-06-13 | End: 2023-06-16 | Stop reason: DRUGHIGH

## 2023-06-13 RX ORDER — OXYCODONE HYDROCHLORIDE 5 MG/1
5 TABLET ORAL EVERY 4 HOURS PRN
Qty: 33 TABLET | Refills: 0 | Status: SHIPPED | OUTPATIENT
Start: 2023-06-13 | End: 2023-06-23

## 2023-06-13 RX ORDER — AMOXICILLIN 250 MG
1-2 CAPSULE ORAL 2 TIMES DAILY
Qty: 30 TABLET | Refills: 0 | Status: SHIPPED | OUTPATIENT
Start: 2023-06-13 | End: 2023-06-16

## 2023-06-13 RX ORDER — ENOXAPARIN SODIUM 100 MG/ML
40 INJECTION SUBCUTANEOUS EVERY 24 HOURS
Status: COMPLETED | OUTPATIENT
Start: 2023-06-13 | End: 2023-06-13

## 2023-06-13 RX ADMIN — OXYCODONE HYDROCHLORIDE 10 MG: 5 TABLET ORAL at 04:45

## 2023-06-13 RX ADMIN — ACETAMINOPHEN 975 MG: 325 TABLET ORAL at 22:27

## 2023-06-13 RX ADMIN — POLYETHYLENE GLYCOL 3350 17 G: 17 POWDER, FOR SOLUTION ORAL at 09:36

## 2023-06-13 RX ADMIN — SENNOSIDES AND DOCUSATE SODIUM 1 TABLET: 50; 8.6 TABLET ORAL at 09:37

## 2023-06-13 RX ADMIN — FAMOTIDINE 20 MG: 10 INJECTION INTRAVENOUS at 09:36

## 2023-06-13 RX ADMIN — HYDROXYZINE HYDROCHLORIDE 10 MG: 10 TABLET ORAL at 04:42

## 2023-06-13 RX ADMIN — SENNOSIDES AND DOCUSATE SODIUM 1 TABLET: 50; 8.6 TABLET ORAL at 21:09

## 2023-06-13 RX ADMIN — ATORVASTATIN CALCIUM 20 MG: 20 TABLET, FILM COATED ORAL at 21:09

## 2023-06-13 RX ADMIN — ACETAMINOPHEN 975 MG: 325 TABLET ORAL at 15:44

## 2023-06-13 RX ADMIN — ACETAMINOPHEN 975 MG: 325 TABLET ORAL at 06:53

## 2023-06-13 ASSESSMENT — ACTIVITIES OF DAILY LIVING (ADL)
ADLS_ACUITY_SCORE: 23
ADLS_ACUITY_SCORE: 21
ADLS_ACUITY_SCORE: 23
ADLS_ACUITY_SCORE: 21
ADLS_ACUITY_SCORE: 23
ADLS_ACUITY_SCORE: 21
ADLS_ACUITY_SCORE: 23
ADLS_ACUITY_SCORE: 21
ADLS_ACUITY_SCORE: 23

## 2023-06-13 NOTE — PROVIDER NOTIFICATION
Jose Viramontes PA-C, patient is increasingly confused this morning post initial assessment. She is trying to jump out of bed, unsure of using call light and unable to recall timing (such as breakfast or when nurse was last in there to check on patient) do we want to do any interventions?

## 2023-06-13 NOTE — PROGRESS NOTES
"ORTHOPEDIC LOWER EXTREMITY PROGRESS NOTE    POD#1  Patient is a 88 year old female who underwent Procedure(s):  LEFT TOTAL KNEE ARTHROPLASTY on 2023. Pain is moderate. No CP or SOB.    Vitals:   Blood pressure 124/74, pulse 93, temperature 98.5  F (36.9  C), temperature source Oral, resp. rate 16, height 1.626 m (5' 4\"), weight 72.1 kg (159 lb), SpO2 99 %, not currently breastfeeding.  Temp (24hrs), Av.9  F (36.6  C), Min:97.6  F (36.4  C), Max:98.5  F (36.9  C)        EXAM   The patient is alert description: awake and alert.  Calves are soft and non-tender.   Sensation is intact.  Dorsiflexion and plantar flexion is intact.  Foot warm with nl cap refill.  The incision is incision: covered.     Labs:   Recent Labs   Lab Test 23  0627 23  0941 21  1041   HGB 12.1 14.0 13.4         ASSESSMENT  POD #1    Satisfactory progress, hgb stable    PLAN  1. DVT prophylaxis: anticoagulants: Lovenox (LMWH) today, then resume Xarelto tomorrow.  2. Weight Bearing: weightbearing: WBAT (Weight bearing as tolerated).  3. Anticipated discharge date TBD . Discharge to SNF  4. Cont Pain Control pain medication: Oxycodone    Preop mobility poor. Patient lives alone. Patient is a fall risk. Xarelto for A-fib increases her risk for wound drainage/ dehiscence especially if there is a fall. She will need additional PT to make gait and transfers safe. (Accelerated knee rehab is NOT the issue). The issue is safety and wound healing given her advanced age (88), poor mobility, and need for anticoagulation. Knee rehab can be slow.     Jitendra Blas MD    "

## 2023-06-13 NOTE — PLAN OF CARE
Goal Outcome Evaluation:    Date/Time 6/12/23 [id number]    Trauma/Ortho/Medical (Choose one) Ortho    Diagnosis:Left total knee arthroplasty  POD#:0  Mental Status:Aox4  Activity/dangle: Ax1 GB/W, ambulated in puri this afternoon, up in chair for dinner.  Diet:Regular  Pain: denies pain when at rest, moderate pain with movement. Prn oxycodone given x1.  Walter/Voiding:Continent b/b  Tele/Restraints/Iso:n/a  02/LDA:VSS on RA, L PIV SL, int abx  D/C Date: pending pt progress  Other Info: WBAT, CMS intact

## 2023-06-13 NOTE — PROGRESS NOTES
Hospitalist chart check note:    Aileen Raygoza is a 88 year old female admitted on 6/12/2023. She has a PMHx of pAF, HTN, dyslipidemia.      Osteoarthritis of Left Knee s/p left total knee arthroplasty  Surgery completed under spinal anesthesia, EBL 25cc. Surgeon Dr. Blas. Pt received 1300 ml crystalloid.   - DVT prophylaxis - Lovenox on POD #1, then resume Xarelto on POD #2.   - plan for formal therapy eval, and discharge to SNF per ortho notes      HTN  Dyslipidemia  Well controlled on monotherapy (lisinopril).   - restart lisinopril tomorrow after BMP (Hospitalist to chart check BP and BMP)  - continue statin     Paroxysmal Atrial Fibrillation   Anticoagulated on Xarelto, no rate control.   - DVT prophylaxis plan determined by PCP  - plan for outpatient discussion with PCP to risk stratify bleeding risk w/ risk of cardiovascular event in the setting of afib        Medications were reconciled for admit/discharge.  Chart check on POD #1, chronic medical conditions are treated and stable.    Martin Viramontes PA-C

## 2023-06-13 NOTE — PLAN OF CARE
Goal Outcome Evaluation:      Plan of Care Reviewed With: patient        Patient vital signs are at baseline: Yes  Patient able to ambulate as they were prior to admission or with assist devices provided by therapies during their stay:  Yes, A1 GB&W  Patient MUST void prior to discharge:  Yes, voids in bathroom   Patient able to tolerate oral intake:  Yes  Pain has adequate pain control using Oral analgesics:  Yes  Does patient have an identified :  No,  Reason:  TBD   Has goal D/C date and time been discussed with patient:  No,  Reason:  Pt has noone at home and may likely need SNF placement     POD#1 Left TKA  AOx4, VSS on RA, WBAT LLE, brace on when OOB. CMS intact. Tolerates regular diet. Adequate I/Os. Pain managed w/ tylenol and as needed oxycodone, effective. States she ddind't get much sleep last night. Hoping to rest today.  Also, she states she doesn't want to discharge to TCU  Plan: restart lisinopril today after BMP. Possible discharge to SNFonce medically ready.

## 2023-06-13 NOTE — PLAN OF CARE
OT: Order received, chart reviewed and discussed with care team. OT not indicated due to per ortho, pt plans to DC to TCU, as pt lives alone. Defer discharge recommendations to ortho team. Will complete orders.

## 2023-06-13 NOTE — CONSULTS
Care Management Initial Consult    See plan of care note from Nirmala Ewing on 5/8 which includes a lot more details of home plan.     General Information  Assessment completed with: Patient,    Type of CM/SW Visit: Offer D/C Planning    Primary Care Provider verified and updated as needed: Yes   Readmission within the last 30 days:        Reason for Consult: discharge planning  Advance Care Planning:            Communication Assessment  Patient's communication style: spoken language (English or Bilingual)    Hearing Difficulty or Deaf: no   Wear Glasses or Blind: no    Cognitive  Cognitive/Neuro/Behavioral: WDL                      Living Environment:   People in home: alone     Current living Arrangements: condominium      Able to return to prior arrangements:         Family/Social Support:  Care provided by: self  Provides care for:    Marital Status:              Description of Support System:           Current Resources:   Patient receiving home care services: No     Community Resources:    Equipment currently used at home: none  Supplies currently used at home:      Employment/Financial:  Employment Status: retired        Financial Concerns:             Does the patient's insurance plan have a 3 day qualifying hospital stay waiver?  No    Lifestyle & Psychosocial Needs:  Social Determinants of Health     Tobacco Use: Low Risk  (6/12/2023)    Patient History      Smoking Tobacco Use: Never      Smokeless Tobacco Use: Never      Passive Exposure: Not on file   Alcohol Use: Not At Risk (6/8/2021)    AUDIT-C      Frequency of Alcohol Consumption: Monthly or less      Average Number of Drinks: 1 or 2      Frequency of Binge Drinking: Never   Financial Resource Strain: Not on file   Food Insecurity: Not on file   Transportation Needs: Not on file   Physical Activity: Not on file   Stress: Not on file   Social Connections: Not on file   Intimate Partner Violence: Not on file   Depression: At risk (6/5/2023)     "PHQ-2      PHQ-2 Score: 3   Housing Stability: Not on file       Functional Status:  Prior to admission patient needed assistance:              Mental Health Status:          Chemical Dependency Status:                Values/Beliefs:  Spiritual, Cultural Beliefs, Protestant Practices, Values that affect care:                 Additional Information:  Met with patient to discuss role in discharge planning. Pt lives indep in [facility], no services in the building. Pt is indep at baseline, drives.  Pt seemed frazzled and at times confused during conversation thinking she had met with me before (she had not) and discussing that she \"already arranged all this\"  CC explained how patient had a pre-assessment at [facility] but inpatient staff helps to facilitate TCU discharge.   Reviewed recommendation for TCU and patient in agreement with this.    Wants referral sent to [facility], wants Private room (aware of extra fee).  If [facility] not available would want [facility] or [facility] (last resort)  Pt aware that she would need an inpatient 3 day stay  to qualify for TCU under Medicare or else would be private pay.  Pt seemed to understand this but again seemed frustrated with care and wearing oxygen.     Referral sent to [facility] via [employer].  CC talked with admissions who said she will review.  Did not anticipate private room till Thursday. Admission noted they had talked with patient pre-operatively and also tried to explain different scenarios which they felt might have overwhelmed patient. .     CC called daughter Miri to discuss discharge plan.  She notes Pt is normally A/O and likes to be in charge and had tried to pre-arrange everything prior.  She wonders if patient's perceived confusion is actually more frustration at not being able to control things. Miri agrees patient needs TCU at discharge as daughters are not able to help. Miri also aware of medicare guidelines/3 days stay.   She would like to be updated of discharge " plans.  She can transport at discharge.     Charge RN/Bedside RN updated to explain plan of care to patient, rationale for oxygen.     CC/SW to follow for discharge planning to TCU.     Brittani Romo RN

## 2023-06-13 NOTE — PROGRESS NOTES
Pt is AOx4 but having increased confusion and impulsion, provider aware. A1GBW, pain well controlled with scheduled tylenol. Regular diet. VSS on RA. Discharge pending.

## 2023-06-14 ENCOUNTER — APPOINTMENT (OUTPATIENT)
Dept: PHYSICAL THERAPY | Facility: CLINIC | Age: 88
DRG: 470 | End: 2023-06-14
Attending: ORTHOPAEDIC SURGERY
Payer: MEDICARE

## 2023-06-14 LAB
ANION GAP SERPL CALCULATED.3IONS-SCNC: 11 MMOL/L (ref 7–15)
BUN SERPL-MCNC: 9.8 MG/DL (ref 8–23)
CALCIUM SERPL-MCNC: 8.2 MG/DL (ref 8.8–10.2)
CHLORIDE SERPL-SCNC: 105 MMOL/L (ref 98–107)
CREAT SERPL-MCNC: 0.45 MG/DL (ref 0.51–0.95)
DEPRECATED HCO3 PLAS-SCNC: 21 MMOL/L (ref 22–29)
GFR SERPL CREATININE-BSD FRML MDRD: >90 ML/MIN/1.73M2
GLUCOSE SERPL-MCNC: 114 MG/DL (ref 70–99)
GLUCOSE SERPL-MCNC: 114 MG/DL (ref 70–99)
HGB BLD-MCNC: 12 G/DL (ref 11.7–15.7)
POTASSIUM SERPL-SCNC: 4.1 MMOL/L (ref 3.4–5.3)
SODIUM SERPL-SCNC: 137 MMOL/L (ref 136–145)

## 2023-06-14 PROCEDURE — 250N000013 HC RX MED GY IP 250 OP 250 PS 637: Performed by: PHYSICIAN ASSISTANT

## 2023-06-14 PROCEDURE — 250N000013 HC RX MED GY IP 250 OP 250 PS 637: Performed by: ORTHOPAEDIC SURGERY

## 2023-06-14 PROCEDURE — 250N000013 HC RX MED GY IP 250 OP 250 PS 637: Performed by: NURSE PRACTITIONER

## 2023-06-14 PROCEDURE — 97530 THERAPEUTIC ACTIVITIES: CPT | Mod: GP

## 2023-06-14 PROCEDURE — 82947 ASSAY GLUCOSE BLOOD QUANT: CPT | Performed by: ORTHOPAEDIC SURGERY

## 2023-06-14 PROCEDURE — 85018 HEMOGLOBIN: CPT | Performed by: ORTHOPAEDIC SURGERY

## 2023-06-14 PROCEDURE — 36415 COLL VENOUS BLD VENIPUNCTURE: CPT | Performed by: ORTHOPAEDIC SURGERY

## 2023-06-14 PROCEDURE — 82947 ASSAY GLUCOSE BLOOD QUANT: CPT | Performed by: PHYSICIAN ASSISTANT

## 2023-06-14 PROCEDURE — 120N000001 HC R&B MED SURG/OB

## 2023-06-14 PROCEDURE — 97116 GAIT TRAINING THERAPY: CPT | Mod: GP

## 2023-06-14 RX ORDER — LISINOPRIL 20 MG/1
20 TABLET ORAL DAILY
Status: DISCONTINUED | OUTPATIENT
Start: 2023-06-14 | End: 2023-06-15 | Stop reason: HOSPADM

## 2023-06-14 RX ADMIN — RIVAROXABAN 10 MG: 10 TABLET, FILM COATED ORAL at 09:16

## 2023-06-14 RX ADMIN — ACETAMINOPHEN 975 MG: 325 TABLET ORAL at 14:47

## 2023-06-14 RX ADMIN — LISINOPRIL 20 MG: 20 TABLET ORAL at 12:01

## 2023-06-14 RX ADMIN — POLYETHYLENE GLYCOL 3350 17 G: 17 POWDER, FOR SOLUTION ORAL at 09:16

## 2023-06-14 RX ADMIN — SENNOSIDES AND DOCUSATE SODIUM 1 TABLET: 50; 8.6 TABLET ORAL at 20:46

## 2023-06-14 RX ADMIN — HYDROXYZINE HYDROCHLORIDE 10 MG: 10 TABLET ORAL at 20:46

## 2023-06-14 RX ADMIN — ACETAMINOPHEN 975 MG: 325 TABLET ORAL at 09:16

## 2023-06-14 RX ADMIN — ATORVASTATIN CALCIUM 20 MG: 20 TABLET, FILM COATED ORAL at 20:46

## 2023-06-14 RX ADMIN — FAMOTIDINE 20 MG: 20 TABLET ORAL at 09:16

## 2023-06-14 RX ADMIN — SENNOSIDES AND DOCUSATE SODIUM 1 TABLET: 50; 8.6 TABLET ORAL at 09:16

## 2023-06-14 RX ADMIN — ACETAMINOPHEN 975 MG: 325 TABLET ORAL at 22:08

## 2023-06-14 ASSESSMENT — ACTIVITIES OF DAILY LIVING (ADL)
ADLS_ACUITY_SCORE: 24
ADLS_ACUITY_SCORE: 23
ADLS_ACUITY_SCORE: 24
ADLS_ACUITY_SCORE: 23

## 2023-06-14 NOTE — PROGRESS NOTES
Hospitalist chart check note:    Aileen Raygoza is a 88 year old female admitted on 6/12/2023. She has a PMHx of pAF, HTN, dyslipidemia.      Osteoarthritis of Left Knee s/p left total knee arthroplasty  Surgery completed under spinal anesthesia, EBL 25cc. Surgeon Dr. Blas. Pt received 1300 ml crystalloid.   - DVT prophylaxis - Lovenox on POD #1, then resume Xarelto on POD #2.   - plan for formal therapy eval, and discharge to SNF per ortho notes     Postoperative confusion  Noted by RN staff to have increased confusion, reportedly trying to get out of bed without using her call light, unable to recall how long she has been in the hospital, etc.  She is otherwise alert and oriented.  - Monitor for delirium.  Frequent redirection.  - Minimize narcotic use     HTN  Dyslipidemia  Well controlled on monotherapy (lisinopril).   - BMP on 6/14 is stable.  Blood pressure mildly elevated.  - Resume PTA lisinopril  - continue statin     Paroxysmal Atrial Fibrillation   Anticoagulated on Xarelto, no rate control.   - DVT prophylaxis plan determined by PCP  - plan for outpatient discussion with PCP to risk stratify bleeding risk w/ risk of cardiovascular event in the setting of afib        Medications were reconciled for admit/discharge.  Chart check on POD #1, chronic medical conditions are treated and stable.  Will follow peripherally for postop confusion if patient remains hospitalized.    Martin Viramontes PA-C

## 2023-06-14 NOTE — PROGRESS NOTES
Alert and oriented x3-4. VSS, on RA. Tolerating regular diet. Denies nausea and vomiting. Active bowel sounds x4q. 1 BM during this morning. Voiding adequately. Left knee incision dressing CDI. Assist of 1 with a walker and a gait belt. Pain was managed with scheduled tylenol.

## 2023-06-14 NOTE — PROGRESS NOTES
"ORTHOPEDIC LOWER EXTREMITY PROGRESS NOTE    POD#2  Patient is a 88 year old female who underwent Procedure(s):  LEFT TOTAL KNEE ARTHROPLASTY on 2023. Pain is controlled. Confusion overnight per chart review.  Planning to discharge to TCU.    Vitals:   Blood pressure (!) 148/83, pulse 104, temperature 98.2  F (36.8  C), temperature source Oral, resp. rate 16, height 1.626 m (5' 4\"), weight 72.1 kg (159 lb), SpO2 94 %, not currently breastfeeding.  Temp (24hrs), Av.3  F (36.8  C), Min:98  F (36.7  C), Max:98.8  F (37.1  C)      Drains: None    EXAM   The patient is awake and alert.  A&O x4  Calves are soft and non-tender.   Sensation is intact.  Dorsiflexion and plantar flexion is intact.  Foot warm with nl cap refill.  The incision is covered.     Labs:   Recent Labs   Lab Test 23  0638 23  0627 23  0941   HGB 12.0 12.1 14.0       ASSESSMENT  S/p R TKA  Post op confusion - improved this morning  PLAN  1. DVT prophylaxis: Xarelto  2. Weight Bearing: WBAT (Weight bearing as tolerated).  3. Anticipated discharge date tomorrow. Discharge to Sk[facility], [facility].  .  SW following, appreciate assistance  4. Cont Pain Control Oxycodone and Tylenol.  Minimize narcotics due to delirium.  Oxycodone dose decreased to 5 mg q4h prn.  5. Medicine following, appreciate assistance    Nirmala Ewing PA-C  [facility]        "

## 2023-06-14 NOTE — PLAN OF CARE
Goal Outcome Evaluation:         Patient vital signs are at baseline: Yes, denies numbness or tingling, a/o x3-4, has lost track of time believes she has been here longer than she has. Forgets to use call light and sets off bed/chair alarm frequently.  Patient able to ambulate as they were prior to admission or with assist devices provided by therapies during their stay:  Yes, up with one assist, gait belt and walker  Patient MUST void prior to discharge:  Yes  Patient able to tolerate oral intake:  Yes  Pain has adequate pain control using Oral analgesics:  Yes, pain controlled with tylenol, rest and ice, avoided narcotics d/t confusion.   Does patient have an identified :  Yes  Has goal D/C date and time been discussed with patient:  Yes, discharge planning in progress to TCU.

## 2023-06-14 NOTE — PROGRESS NOTES
Care Management Follow Up    Length of Stay (days): 2    Expected Discharge Date: 06/15/2023     Concerns to be Addressed:       Patient plan of care discussed at interdisciplinary rounds: Yes    Anticipated Discharge Disposition: Transitional Care     Anticipated Discharge Services:    Anticipated Discharge DME:      Patient/family educated on Medicare website which has current facility and service quality ratings:    Education Provided on the Discharge Plan:    Patient/Family in Agreement with the Plan: yes    Referrals Placed by CM/SW: Post Acute Facilities  Private pay costs discussed: private room/amenity fees    Additional Information:  Received call from [facility] for update on confusion, status. Notes reviewed. They are following for admission and would have private room available on 6/15.  She will call SW on 6/15 to see if ready for discharge.   SW updated.   Will follow for discharge planning.     Addendum 12:18:  Received call from Daughter Miri regarding discharge planning. Reviewed notes that plan for discharge tomorrow to TCU.  Updated that [facility] would have private room tomorrow.  She can be at hospital by 12:15 and get to [facility] by 1pm.  Updated that SW will follow discharge. Charge RN and Pt updated of discharge plans tomorrow.       Brittani Romo RN

## 2023-06-15 VITALS
TEMPERATURE: 98.8 F | WEIGHT: 159 LBS | HEIGHT: 64 IN | OXYGEN SATURATION: 95 % | DIASTOLIC BLOOD PRESSURE: 73 MMHG | SYSTOLIC BLOOD PRESSURE: 109 MMHG | BODY MASS INDEX: 27.14 KG/M2 | HEART RATE: 100 BPM | RESPIRATION RATE: 16 BRPM

## 2023-06-15 PROCEDURE — 250N000013 HC RX MED GY IP 250 OP 250 PS 637: Performed by: ORTHOPAEDIC SURGERY

## 2023-06-15 RX ADMIN — SENNOSIDES AND DOCUSATE SODIUM 1 TABLET: 50; 8.6 TABLET ORAL at 08:45

## 2023-06-15 RX ADMIN — ACETAMINOPHEN 975 MG: 325 TABLET ORAL at 08:45

## 2023-06-15 RX ADMIN — FAMOTIDINE 20 MG: 20 TABLET ORAL at 08:45

## 2023-06-15 RX ADMIN — RIVAROXABAN 10 MG: 10 TABLET, FILM COATED ORAL at 08:45

## 2023-06-15 ASSESSMENT — ACTIVITIES OF DAILY LIVING (ADL)
ADLS_ACUITY_SCORE: 23
ADLS_ACUITY_SCORE: 24

## 2023-06-15 NOTE — PROGRESS NOTES
Care Management Discharge Note    Discharge Date: 06/15/2023       Discharge Disposition: Transitional Care    Discharge Services:      Discharge DME:      Discharge Transportation: family or friend will provide    Private pay costs discussed: Not applicable    Does the patient's insurance plan have a 3 day qualifying hospital stay waiver?  Yes   Will the waiver be used for post-acute placement? No    PAS Confirmation Code: [id number]  Patient/family educated on Medicare website which has current facility and service quality ratings:      Education Provided on the Discharge Plan:    Persons Notified of Discharge Plans: TCU  Patient/Family in Agreement with the Plan: yes    Handoff Referral Completed: Yes    Additional Information:  Writer completed PAS form and placed it on the patient's chart. Writer submitted orders. Writer called [phone] and left a VM to update the patient's discharge time. Writer updated Bedside RN, Charge RN and HUC.  Writer will inform the patient as well.  PAS-RR    D: Per DHS regulation, SW completed and submitted PAS-RR to [place] [employer] Direct Connect via the Senior [employer] Line.  PAS-RR confirmation # is : [id number]    I: SW spoke with patient and they are aware a PAS-RR has been submitted.  SW reviewed with patient that they may be contacted for a follow up appointment within 10 days of hospital discharge if their SNF stay is < 30 days.  Contact information for [employer] was also provided.    A: Patient verbalized understanding.    P: Further questions may be directed to Senior [employer] Line at #[phone], option #4 for PAS-RR staff.      KOKI Muñoz  [facility]  Social Work

## 2023-06-15 NOTE — PROGRESS NOTES
Hospitalist chart check note:    Aileen Raygoza is a 88 year old female admitted on 6/12/2023. She has a PMHx of pAF, HTN, dyslipidemia.      Osteoarthritis of Left Knee s/p left total knee arthroplasty  Surgery completed under spinal anesthesia, EBL 25cc. Surgeon Dr. Blas. Pt received 1300 ml crystalloid.   - DVT prophylaxis - Lovenox on POD #1, then resume Xarelto on POD #2.   - PT eval and discharge to SNF per ortho notes       Postoperative confusion  Noted by RN staff to have increased confusion, reportedly trying to get out of bed without using her call light, unable to recall how long she has been in the hospital, etc.  She is otherwise alert and oriented.  - Monitor for delirium.  Frequent redirection.  - Minimize narcotic use     HTN  Dyslipidemia  Well controlled on monotherapy (lisinopril).   - BMP on 6/14 is stable.  Blood pressure mildly elevated.  - Resume PTA lisinopril  - continue statin     Paroxysmal Atrial Fibrillation   Anticoagulated on Xarelto, no rate control.   - DVT prophylaxis plan determined by PCP  - plan for outpatient discussion with PCP to risk stratify bleeding risk w/ risk of cardiovascular event in the setting of afib        Medications were reconciled for admit/discharge.  Chart check on POD #1 and #2, chronic medical conditions are treated and stable.      Martin Viramontes PA-C

## 2023-06-15 NOTE — PLAN OF CARE
Goal Outcome Evaluation:       Summary:  patient is alert and oriented X3, disoriented to situation. VSS on RA with good sat. Up with assist of 1 with gait belt and walker. On regular diet with good appetite. PIV saline locked. Pain managed well with scheduled acetaminophen and atarax. CMS intact. Dressing with small dry drainage and intact. Continent of B&B, void in the bathroom.  Patient had large soft BM this shift.Awaiting TCU placement

## 2023-06-15 NOTE — PROGRESS NOTES
Pt A/Ox4 for writer, VSS on RA, SBA with walker, brace on L knee, denies numbness/tingling, no IV access, pain controlled with scheduled tylenol, discharge to TCU pending.

## 2023-06-15 NOTE — PROGRESS NOTES
"ORTHOPEDIC LOWER EXTREMITY PROGRESS NOTE    POD#3  Patient is a 88 year old female who underwent Procedure(s):  LEFT TOTAL KNEE ARTHROPLASTY on 2023. Pain is controlled. Doing well, no complaints.  Discharging to TCU today    Vitals:   Blood pressure 109/73, pulse 100, temperature 98.8  F (37.1  C), temperature source Oral, resp. rate 16, height 1.626 m (5' 4\"), weight 72.1 kg (159 lb), SpO2 95 %, not currently breastfeeding.  Temp (24hrs), Av.3  F (36.8  C), Min:98  F (36.7  C), Max:98.8  F (37.1  C)      Drains: None    EXAM   The patient is awake and alert.  A&O x4  Calves are soft and non-tender.   Sensation is intact.  Dorsiflexion and plantar flexion is intact.  Foot warm with nl cap refill.  The incision is covered with mild shadow    Labs:   Recent Labs   Lab Test 23  0638 23  0627 23  0941   HGB 12.0 12.1 14.0       ASSESSMENT  S/p R TKA  Post op confusion - resolved  PLAN  1. DVT prophylaxis: Xarelto  2. Weight Bearing: WBAT (Weight bearing as tolerated).  3. Anticipated discharge date today. Discharge to Skilled Nursing [facility].  .  SW following, appreciate assistance  4. Cont Pain Control Oxycodone and Tylenol.  Minimize narcotics due to delirium.  Oxycodone dose decreased to 5 mg q4h prn.  5. Medicine following, appreciate assistance    Nirmala Ewing PA-C  [facility]        "

## 2023-06-16 ENCOUNTER — TRANSITIONAL CARE UNIT VISIT (OUTPATIENT)
Dept: GERIATRICS | Facility: CLINIC | Age: 88
End: 2023-06-16
Payer: MEDICARE

## 2023-06-16 VITALS
HEIGHT: 64 IN | SYSTOLIC BLOOD PRESSURE: 137 MMHG | DIASTOLIC BLOOD PRESSURE: 73 MMHG | TEMPERATURE: 97.4 F | BODY MASS INDEX: 27.57 KG/M2 | WEIGHT: 161.5 LBS | HEART RATE: 104 BPM | OXYGEN SATURATION: 96 % | RESPIRATION RATE: 16 BRPM

## 2023-06-16 DIAGNOSIS — Z96.652 S/P TOTAL KNEE ARTHROPLASTY, LEFT: ICD-10-CM

## 2023-06-16 DIAGNOSIS — M17.12 PRIMARY OSTEOARTHRITIS OF LEFT KNEE: Primary | ICD-10-CM

## 2023-06-16 DIAGNOSIS — K59.03 DRUG-INDUCED CONSTIPATION: ICD-10-CM

## 2023-06-16 DIAGNOSIS — I48.0 PAF (PAROXYSMAL ATRIAL FIBRILLATION) (H): ICD-10-CM

## 2023-06-16 DIAGNOSIS — D62 ANEMIA DUE TO BLOOD LOSS, ACUTE: ICD-10-CM

## 2023-06-16 DIAGNOSIS — I10 BENIGN ESSENTIAL HYPERTENSION: ICD-10-CM

## 2023-06-16 DIAGNOSIS — Z47.1 AFTERCARE FOLLOWING LEFT KNEE JOINT REPLACEMENT SURGERY: ICD-10-CM

## 2023-06-16 DIAGNOSIS — Z96.652 AFTERCARE FOLLOWING LEFT KNEE JOINT REPLACEMENT SURGERY: ICD-10-CM

## 2023-06-16 PROCEDURE — 99310 SBSQ NF CARE HIGH MDM 45: CPT | Performed by: NURSE PRACTITIONER

## 2023-06-16 RX ORDER — ACETAMINOPHEN 500 MG
1000 TABLET ORAL 3 TIMES DAILY
Start: 2023-06-16

## 2023-06-16 RX ORDER — AMOXICILLIN 250 MG
1 CAPSULE ORAL 2 TIMES DAILY
Qty: 30 TABLET | Refills: 0
Start: 2023-06-16 | End: 2024-01-08

## 2023-06-16 NOTE — PROGRESS NOTES
[facility]ERIATR[facility]    PRIMARY CARE PROVIDER AND CLINIC:  Katie Mcdaniel MD, [address] [place] [address] / [facility] [address]  Chief Complaint   Patient presents with     Hospital F/U      [place] [facility] Record Number:  [id number]  Place of Service where encounter took place:  [facility] (U) [25]    Aileen Raygoza  is a 88 year old  (1935), admitted to the above facility from  [facility]. Hospital stay 6/12/23 through 6/15/23..   HPI:    PMH of HTN, PAF, HLD who presented for elective left TKA   DJD s/p left TKA Dr. Blas  Post op confusion monitor, limit narcotic use  On exam today: patient resting in bed at time of exam, denies pain at rest, states pain does increase to 7/10 with activity and weight bearing, denies fever, chills, CP, palpitations, N/V/D states bowels are working and she slept well last night.     CODE STATUS/ADVANCE DIRECTIVES DISCUSSION:  Prior  CPR/Full code   ALLERGIES:   Allergies   Allergen Reactions     Simvastatin Muscle Pain (Myalgia)      PAST MEDICAL HISTORY:   Past Medical History:   Diagnosis Date     Benign essential hypertension      Impaired fasting glucose      PAF (paroxysmal atrial fibrillation) (H)      Pure hypercholesterolemia       PAST SURGICAL HISTORY:   has a past surgical history that includes d & c; tonsillectomy & adenoidectomy; Colonoscopy (N/A, 02/05/2020); and Arthroplasty knee (Left, 6/12/2023).  FAMILY HISTORY: family history includes Alzheimer Disease in her brother; Colon Cancer in her mother; Coronary Artery Disease in her brother; Impaired Fasting Glucose in her mother.  SOCIAL HISTORY:   reports that she has never smoked. She has never used smokeless tobacco. She reports that she does not currently use alcohol. She reports that she does not use drugs.  Patient's living condition: lives alone    Post Discharge Medication Reconciliation Status:   MED REC REQUIRED  Post Medication Reconciliation Status: discharge  "medications reconciled and changed, per note/orders       Current Outpatient Medications   Medication Sig     acetaminophen (TYLENOL) 325 MG tablet Take 2 tablets (650 mg) by mouth every 4 hours as needed for other (mild pain)     atorvastatin (LIPITOR) 20 MG tablet Take 1 tablet (20 mg) by mouth daily     Calcium Carbonate-Vitamin D (CALCIUM + D PO) Take 1 tablet by mouth daily     Cholecalciferol (VITAMIN D PO) Take 1 tablet by mouth daily     diphenhydrAMINE-acetaminophen (TYLENOL PM)  MG tablet Take 1 tablet by mouth At Bedtime     lisinopril (ZESTRIL) 20 MG tablet Take 1 tablet (20 mg) by mouth daily     Multiple Vitamins-Minerals (PRESERVISION AREDS 2 PO) Take 1 capsule by mouth 2 times daily     oxyCODONE (ROXICODONE) 5 MG tablet Take 1 tablet (5 mg) by mouth every 4 hours as needed for moderate to severe pain     rivaroxaban ANTICOAGULANT (XARELTO) 20 MG TABS tablet Take 1 tablet (20 mg) by mouth daily (with dinner)     senna-docusate (SENOKOT-S/PERICOLACE) 8.6-50 MG tablet Take 1-2 tablets by mouth 2 times daily Take while on oral narcotics to prevent or treat constipation.     No current facility-administered medications for this visit.       ROS:  10 point ROS of systems including Constitutional, Eyes, Respiratory, Cardiovascular, Gastroenterology, Genitourinary, Integumentary, Musculoskeletal, Psychiatric were all negative except for pertinent positives noted in my HPI.    Vitals:  /73   Pulse 104   Temp 97.4  F (36.3  C)   Resp 16   Ht 1.626 m (5' 4\")   Wt 73.3 kg (161 lb 8 oz)   LMP  (LMP Unknown)   SpO2 96%   BMI 27.72 kg/m    Exam:  GENERAL APPEARANCE:  Alert, in no distress  ENT:  Mouth and posterior oropharynx normal, moist mucous membranes, normal hearing acuity  EYES:  EOM, conjunctivae, lids, pupils and irises normal, PERRL  RESP:  respiratory effort and palpation of chest normal, lungs clear to auscultation , no respiratory distress  CV:  Palpation and auscultation of heart " done , regular rate and rhythm, no murmur, rub, or gallop, peripheral edema trace+ in LLE  ABDOMEN:  normal bowel sounds, soft, nontender, no hepatosplenomegaly or other masses  M/S:   patient resting in bed  SKIN:  Inspection of skin and subcutaneous tissue baseline, did not visualize surgical incision  NEURO:   speech wnl  PSYCH:  affect and mood normal    Lab/Diagnostic data:  Recent labs in [facility] reviewed by me today.  and   Most Recent 3 CBC's:Recent Labs   Lab Test 06/14/23  0638 06/13/23  0627 06/05/23  0941 12/07/21  1041 12/07/21  1041 04/03/19  1002   WBC  --   --  5.3  --  5.4 4.4   HGB 12.0 12.1 14.0   < > 13.4 14.4   MCV  --   --  95  --  97 94   PLT  --   --  231  --  229 201    < > = values in this interval not displayed.     Most Recent 3 BMP's:Recent Labs   Lab Test 06/14/23  0638 06/13/23  0545 06/12/23  0555 06/05/23  0941 02/14/23  1401     --   --  141 140   POTASSIUM 4.1  --  4.3 4.0 4.1   CHLORIDE 105  --   --  105 105   CO2 21*  --   --  25 22   BUN 9.8  --   --  10.8 15.0   CR 0.45*  --  0.71 0.64 0.68   ANIONGAP 11  --   --  11 13   NITESH 8.2*  --   --  9.3 9.3   *  114* 106* 100* 105* 144*       ASSESSMENT/PLAN:    (M17.12) Primary osteoarthritis of left knee  (primary encounter diagnosis)  (Z47.1,  Z96.652) Aftercare following left knee joint replacement surgery  Comment: acute/ongoing  Plan: PT and OT, start tylenol 1000mg TID scheduled and qhs prn, continue oxycodone 5mg q 4 hours prn  F/u with ortho as directed  On xarelto 20mg QD     (K59.03) Drug-induced constipation  Comment: acute/ongoing  Plan: continue senna s     (I10) Benign essential hypertension  (I48.0) PAF (paroxysmal atrial fibrillation) (H)  Comment: acute/ongoing  Plan: follow BMP, continue lisinopril 20mg QD, xarelto 20mg QD    Orders:  BMP and Hgb on Monday  Tylenol 1000mg TID scheduled    Total time spent with patient visit at the skilled nursing facility was 45 min including patient visit and review of  past records. Greater than 50% of total time spent with counseling and coordinating care due to reviewed ortho and internal medicine notes, reviewed lab results and medication changes, discussed hospitalization and medication changes/pain management with patient at bedside.     Electronically signed by:  Tonya Lynn Haase, APRN CNP

## 2023-06-16 NOTE — LETTER
6/16/2023        RE: Aileen Raygoza  [address]  [facility] [address]        [facility]    PRIMARY CARE PROVIDER AND CLINIC:  Katie Mcdaniel MD, [address] [place] [address] / [facility] [address]  Chief Complaint   Patient presents with     Hospital F/U      [place] Medical Record Number:  [id number]  Place of Service where encounter took place:  [facility]) [25]    Aileen Raygoza  is a 88 year old  (1935), admitted to the above facility from  [facility]. Hospital stay 6/12/23 through 6/15/23..   HPI:    PMH of HTN, PAF, HLD who presented for elective left TKA   DJD s/p left TKA Dr. Blas  Post op confusion monitor, limit narcotic use  On exam today: patient resting in bed at time of exam, denies pain at rest, states pain does increase to 7/10 with activity and weight bearing, denies fever, chills, CP, palpitations, N/V/D states bowels are working and she slept well last night.     CODE STATUS/ADVANCE DIRECTIVES DISCUSSION:  Prior  CPR/Full code   ALLERGIES:   Allergies   Allergen Reactions     Simvastatin Muscle Pain (Myalgia)      PAST MEDICAL HISTORY:   Past Medical History:   Diagnosis Date     Benign essential hypertension      Impaired fasting glucose      PAF (paroxysmal atrial fibrillation) (H)      Pure hypercholesterolemia       PAST SURGICAL HISTORY:   has a past surgical history that includes d & c; tonsillectomy & adenoidectomy; Colonoscopy (N/A, 02/05/2020); and Arthroplasty knee (Left, 6/12/2023).  FAMILY HISTORY: family history includes Alzheimer Disease in her brother; Colon Cancer in her mother; Coronary Artery Disease in her brother; Impaired Fasting Glucose in her mother.  SOCIAL HISTORY:   reports that she has never smoked. She has never used smokeless tobacco. She reports that she does not currently use alcohol. She reports that she does not use drugs.  Patient's living condition: lives alone    Post Discharge  "Medication Reconciliation Status:   MED REC REQUIRED  Post Medication Reconciliation Status: discharge medications reconciled and changed, per note/orders       Current Outpatient Medications   Medication Sig     acetaminophen (TYLENOL) 325 MG tablet Take 2 tablets (650 mg) by mouth every 4 hours as needed for other (mild pain)     atorvastatin (LIPITOR) 20 MG tablet Take 1 tablet (20 mg) by mouth daily     Calcium Carbonate-Vitamin D (CALCIUM + D PO) Take 1 tablet by mouth daily     Cholecalciferol (VITAMIN D PO) Take 1 tablet by mouth daily     diphenhydrAMINE-acetaminophen (TYLENOL PM)  MG tablet Take 1 tablet by mouth At Bedtime     lisinopril (ZESTRIL) 20 MG tablet Take 1 tablet (20 mg) by mouth daily     Multiple Vitamins-Minerals (PRESERVISION AREDS 2 PO) Take 1 capsule by mouth 2 times daily     oxyCODONE (ROXICODONE) 5 MG tablet Take 1 tablet (5 mg) by mouth every 4 hours as needed for moderate to severe pain     rivaroxaban ANTICOAGULANT (XARELTO) 20 MG TABS tablet Take 1 tablet (20 mg) by mouth daily (with dinner)     senna-docusate (SENOKOT-S/PERICOLACE) 8.6-50 MG tablet Take 1-2 tablets by mouth 2 times daily Take while on oral narcotics to prevent or treat constipation.     No current facility-administered medications for this visit.       ROS:  10 point ROS of systems including Constitutional, Eyes, Respiratory, Cardiovascular, Gastroenterology, Genitourinary, Integumentary, Musculoskeletal, Psychiatric were all negative except for pertinent positives noted in my HPI.    Vitals:  /73   Pulse 104   Temp 97.4  F (36.3  C)   Resp 16   Ht 1.626 m (5' 4\")   Wt 73.3 kg (161 lb 8 oz)   LMP  (LMP Unknown)   SpO2 96%   BMI 27.72 kg/m    Exam:  GENERAL APPEARANCE:  Alert, in no distress  ENT:  Mouth and posterior oropharynx normal, moist mucous membranes, normal hearing acuity  EYES:  EOM, conjunctivae, lids, pupils and irises normal, PERRL  RESP:  respiratory effort and palpation of chest " normal, lungs clear to auscultation , no respiratory distress  CV:  Palpation and auscultation of heart done , regular rate and rhythm, no murmur, rub, or gallop, peripheral edema trace+ in LLE  ABDOMEN:  normal bowel sounds, soft, nontender, no hepatosplenomegaly or other masses  M/S:   patient resting in bed  SKIN:  Inspection of skin and subcutaneous tissue baseline, did not visualize surgical incision  NEURO:   speech wnl  PSYCH:  affect and mood normal    Lab/Diagnostic data:  Recent labs in [facility] reviewed by me today.  and   Most Recent 3 CBC's:Recent Labs   Lab Test 06/14/23  0638 06/13/23  0627 06/05/23  0941 12/07/21  1041 12/07/21  1041 04/03/19  1002   WBC  --   --  5.3  --  5.4 4.4   HGB 12.0 12.1 14.0   < > 13.4 14.4   MCV  --   --  95  --  97 94   PLT  --   --  231  --  229 201    < > = values in this interval not displayed.     Most Recent 3 BMP's:Recent Labs   Lab Test 06/14/23  0638 06/13/23  0545 06/12/23  0555 06/05/23  0941 02/14/23  1401     --   --  141 140   POTASSIUM 4.1  --  4.3 4.0 4.1   CHLORIDE 105  --   --  105 105   CO2 21*  --   --  25 22   BUN 9.8  --   --  10.8 15.0   CR 0.45*  --  0.71 0.64 0.68   ANIONGAP 11  --   --  11 13   NITESH 8.2*  --   --  9.3 9.3   *  114* 106* 100* 105* 144*       ASSESSMENT/PLAN:    (M17.12) Primary osteoarthritis of left knee  (primary encounter diagnosis)  (Z47.1,  Z96.652) Aftercare following left knee joint replacement surgery  Comment: acute/ongoing  Plan: PT and OT, start tylenol 1000mg TID scheduled and qhs prn, continue oxycodone 5mg q 4 hours prn  F/u with ortho as directed  On xarelto 20mg QD     (K59.03) Drug-induced constipation  Comment: acute/ongoing  Plan: continue senna s     (I10) Benign essential hypertension  (I48.0) PAF (paroxysmal atrial fibrillation) (H)  Comment: acute/ongoing  Plan: follow BMP, continue lisinopril 20mg QD, xarelto 20mg QD    Orders:  BMP and Hgb on Monday  Tylenol 1000mg TID scheduled    Total time spent  with patient visit at the skilled nursing facility was 45 min including patient visit and review of past records. Greater than 50% of total time spent with counseling and coordinating care due to reviewed ortho and internal medicine notes, reviewed lab results and medication changes, discussed hospitalization and medication changes/pain management with patient at bedside.     Electronically signed by:  Tonya Lynn Haase, APRN CNP                       Sincerely,        Tonya Lynn Haase, APRN CNP

## 2023-06-19 ENCOUNTER — TRANSITIONAL CARE UNIT VISIT (OUTPATIENT)
Dept: GERIATRICS | Facility: CLINIC | Age: 88
End: 2023-06-19
Payer: MEDICARE

## 2023-06-19 VITALS
SYSTOLIC BLOOD PRESSURE: 144 MMHG | DIASTOLIC BLOOD PRESSURE: 68 MMHG | TEMPERATURE: 97.5 F | RESPIRATION RATE: 16 BRPM | OXYGEN SATURATION: 96 % | HEART RATE: 98 BPM | WEIGHT: 161.5 LBS | BODY MASS INDEX: 27.57 KG/M2 | HEIGHT: 64 IN

## 2023-06-19 DIAGNOSIS — K59.03 DRUG-INDUCED CONSTIPATION: ICD-10-CM

## 2023-06-19 DIAGNOSIS — Z47.1 AFTERCARE FOLLOWING LEFT KNEE JOINT REPLACEMENT SURGERY: ICD-10-CM

## 2023-06-19 DIAGNOSIS — M17.12 PRIMARY OSTEOARTHRITIS OF LEFT KNEE: Primary | ICD-10-CM

## 2023-06-19 DIAGNOSIS — Z96.652 AFTERCARE FOLLOWING LEFT KNEE JOINT REPLACEMENT SURGERY: ICD-10-CM

## 2023-06-19 DIAGNOSIS — I10 BENIGN ESSENTIAL HYPERTENSION: ICD-10-CM

## 2023-06-19 DIAGNOSIS — D62 ANEMIA DUE TO BLOOD LOSS, ACUTE: ICD-10-CM

## 2023-06-19 DIAGNOSIS — I48.0 PAF (PAROXYSMAL ATRIAL FIBRILLATION) (H): ICD-10-CM

## 2023-06-19 PROCEDURE — 99310 SBSQ NF CARE HIGH MDM 45: CPT | Performed by: NURSE PRACTITIONER

## 2023-06-19 NOTE — LETTER
"    6/19/2023        RE: Aileen Raygoza  [address]  [facility] [address]        [facility]    Chief Complaint   Patient presents with     RECHECK     HPI:  Aileen Raygoza is a 88 year old  (1935), who is being seen today for an episodic care visit at: [facility]) [25]. Today's concern is:   DJD left knee s/p TKA: on exam today patient resting in bed, states she has very little pain at rest, does increase with activity,  In therapy patient is walking up to 350 feet using a RW with SBA  Anemia: see labs  Constipation: states bowels are working  HTN/PAF: /68, 121/76, 136/80 with HR 90 range denies CP, palpitations, SOB    Allergies, and PMH/PSH reviewed in [facility] today.  REVIEW OF SYSTEMS:  10 point ROS of systems including Constitutional, Eyes, Respiratory, Cardiovascular, Gastroenterology, Genitourinary, Integumentary, Musculoskeletal, Psychiatric were all negative except for pertinent positives noted in my HPI.    Objective:   BP (!) 144/68   Pulse 98   Temp 97.5  F (36.4  C)   Resp 16   Ht 1.626 m (5' 4\")   Wt 73.3 kg (161 lb 8 oz)   LMP  (LMP Unknown)   SpO2 96%   BMI 27.72 kg/m    GENERAL APPEARANCE:  Alert, in no distress  ENT:  Mouth and posterior oropharynx normal, moist mucous membranes, normal hearing acuity  EYES:  EOM, conjunctivae, lids, pupils and irises normal, PERRL  RESP:  respiratory effort and palpation of chest normal, lungs clear to auscultation , no respiratory distress  CV:  Palpation and auscultation of heart done , regular rate and rhythm, no murmur, rub, or gallop, peripheral edema trace+ in LLE  ABDOMEN:  normal bowel sounds, soft, nontender, no hepatosplenomegaly or other masses  M/S:   patient resting in bed  SKIN:  Inspection of skin and subcutaneous tissue baseline, did not visualize surgical incision  NEURO:   speech wnl  PSYCH:  affect and mood normal    Recent labs in [facility] reviewed by me today.  and   Most Recent 3 " CBC's:Recent Labs   Lab Test 06/14/23  0638 06/13/23  0627 06/05/23  0941 12/07/21  1041 12/07/21  1041 04/03/19  1002   WBC  --   --  5.3  --  5.4 4.4   HGB 12.0 12.1 14.0   < > 13.4 14.4   MCV  --   --  95  --  97 94   PLT  --   --  231  --  229 201    < > = values in this interval not displayed.     Most Recent 3 BMP's:Recent Labs   Lab Test 06/14/23  0638 06/13/23  0545 06/12/23  0555 06/05/23  0941 02/14/23  1401     --   --  141 140   POTASSIUM 4.1  --  4.3 4.0 4.1   CHLORIDE 105  --   --  105 105   CO2 21*  --   --  25 22   BUN 9.8  --   --  10.8 15.0   CR 0.45*  --  0.71 0.64 0.68   ANIONGAP 11  --   --  11 13   NITESH 8.2*  --   --  9.3 9.3   *  114* 106* 100* 105* 144*       Assessment/Plan:  (M17.12) Primary osteoarthritis of left knee  (primary encounter diagnosis)  (Z47.1,  Z96.652) Aftercare following left knee joint replacement surgery  Comment: acute/ongoing, no change  Plan: PT and OT, continue  tylenol 1000mg TID scheduled and qhs prn, continue oxycodone 5mg q 4 hours prn  F/u with ortho as directed  On xarelto 20mg QD      (K59.03) Drug-induced constipation  Comment: acute/ongoing, no change  Plan: continue senna s 1 PO BID     (I10) Benign essential hypertension  (I48.0) PAF (paroxysmal atrial fibrillation) (H)  Comment: acute/ongoing, no change  Plan: follow BMP, continue lisinopril 20mg QD, xarelto 20mg QD       MED REC REQUIRED  Post Medication Reconciliation Status: medication reconcilation previously completed during another office visit      Orders:  No new orders      Electronically signed by: Tonya Lynn Haase, APRN CNP             Sincerely,        Tonya Lynn Haase, APRN CNP

## 2023-06-19 NOTE — PROGRESS NOTES
"[facility] GERIATRICS    Chief Complaint   Patient presents with     RECHECK     HPI:  Aileen Raygoza is a 88 year old  (1935), who is being seen today for an episodic care visit at: [facility] ([facility]) [25]. Today's concern is:   DJD left knee s/p TKA: on exam today patient resting in bed, states she has very little pain at rest, does increase with activity,  In therapy patient is walking up to 350 feet using a RW with SBA  Anemia: see labs  Constipation: states bowels are working  HTN/PAF: /68, 121/76, 136/80 with HR 90 range denies CP, palpitations, SOB    Allergies, and PMH/PSH reviewed in [facility] today.  REVIEW OF SYSTEMS:  10 point ROS of systems including Constitutional, Eyes, Respiratory, Cardiovascular, Gastroenterology, Genitourinary, Integumentary, Musculoskeletal, Psychiatric were all negative except for pertinent positives noted in my HPI.    Objective:   BP (!) 144/68   Pulse 98   Temp 97.5  F (36.4  C)   Resp 16   Ht 1.626 m (5' 4\")   Wt 73.3 kg (161 lb 8 oz)   LMP  (LMP Unknown)   SpO2 96%   BMI 27.72 kg/m    GENERAL APPEARANCE:  Alert, in no distress  ENT:  Mouth and posterior oropharynx normal, moist mucous membranes, normal hearing acuity  EYES:  EOM, conjunctivae, lids, pupils and irises normal, PERRL  RESP:  respiratory effort and palpation of chest normal, lungs clear to auscultation , no respiratory distress  CV:  Palpation and auscultation of heart done , regular rate and rhythm, no murmur, rub, or gallop, peripheral edema trace+ in LLE  ABDOMEN:  normal bowel sounds, soft, nontender, no hepatosplenomegaly or other masses  M/S:   patient resting in bed  SKIN:  Inspection of skin and subcutaneous tissue baseline, did not visualize surgical incision  NEURO:   speech wnl  PSYCH:  affect and mood normal    Recent labs in [facility] reviewed by me today.  and   Most Recent 3 CBC's:Recent Labs   Lab Test 06/14/23  0638 06/13/23  0627 06/05/23  0941 12/07/21  1041 " 12/07/21  1041 04/03/19  1002   WBC  --   --  5.3  --  5.4 4.4   HGB 12.0 12.1 14.0   < > 13.4 14.4   MCV  --   --  95  --  97 94   PLT  --   --  231  --  229 201    < > = values in this interval not displayed.     Most Recent 3 BMP's:Recent Labs   Lab Test 06/14/23  0638 06/13/23  0545 06/12/23  0555 06/05/23  0941 02/14/23  1401     --   --  141 140   POTASSIUM 4.1  --  4.3 4.0 4.1   CHLORIDE 105  --   --  105 105   CO2 21*  --   --  25 22   BUN 9.8  --   --  10.8 15.0   CR 0.45*  --  0.71 0.64 0.68   ANIONGAP 11  --   --  11 13   NITESH 8.2*  --   --  9.3 9.3   *  114* 106* 100* 105* 144*       Assessment/Plan:  (M17.12) Primary osteoarthritis of left knee  (primary encounter diagnosis)  (Z47.1,  Z96.652) Aftercare following left knee joint replacement surgery  Comment: acute/ongoing, no change  Plan: PT and OT, continue  tylenol 1000mg TID scheduled and qhs prn, continue oxycodone 5mg q 4 hours prn  F/u with ortho as directed  On xarelto 20mg QD      (K59.03) Drug-induced constipation  Comment: acute/ongoing, no change  Plan: continue senna s 1 PO BID     (I10) Benign essential hypertension  (I48.0) PAF (paroxysmal atrial fibrillation) (H)  Comment: acute/ongoing, no change  Plan: follow BMP, continue lisinopril 20mg QD, xarelto 20mg QD       MED REC REQUIRED  Post Medication Reconciliation Status: medication reconcilation previously completed during another office visit      Orders:  No new orders      Electronically signed by: Tonya Lynn Haase, APRN CNP

## 2023-06-21 ENCOUNTER — TRANSITIONAL CARE UNIT VISIT (OUTPATIENT)
Dept: GERIATRICS | Facility: CLINIC | Age: 88
End: 2023-06-21
Payer: MEDICARE

## 2023-06-21 VITALS
TEMPERATURE: 98 F | DIASTOLIC BLOOD PRESSURE: 71 MMHG | HEIGHT: 64 IN | WEIGHT: 162.8 LBS | HEART RATE: 97 BPM | BODY MASS INDEX: 27.79 KG/M2 | SYSTOLIC BLOOD PRESSURE: 131 MMHG | RESPIRATION RATE: 16 BRPM | OXYGEN SATURATION: 95 %

## 2023-06-21 DIAGNOSIS — Z96.652 AFTERCARE FOLLOWING LEFT KNEE JOINT REPLACEMENT SURGERY: ICD-10-CM

## 2023-06-21 DIAGNOSIS — I10 BENIGN ESSENTIAL HYPERTENSION: ICD-10-CM

## 2023-06-21 DIAGNOSIS — D62 ANEMIA DUE TO BLOOD LOSS, ACUTE: ICD-10-CM

## 2023-06-21 DIAGNOSIS — I48.0 PAF (PAROXYSMAL ATRIAL FIBRILLATION) (H): ICD-10-CM

## 2023-06-21 DIAGNOSIS — K59.03 DRUG-INDUCED CONSTIPATION: ICD-10-CM

## 2023-06-21 DIAGNOSIS — M17.12 PRIMARY OSTEOARTHRITIS OF LEFT KNEE: Primary | ICD-10-CM

## 2023-06-21 DIAGNOSIS — Z47.1 AFTERCARE FOLLOWING LEFT KNEE JOINT REPLACEMENT SURGERY: ICD-10-CM

## 2023-06-21 PROCEDURE — 99310 SBSQ NF CARE HIGH MDM 45: CPT | Performed by: NURSE PRACTITIONER

## 2023-06-21 NOTE — LETTER
"    6/21/2023        RE: Aileen Raygoza  [address]  [facility] [address]        [facility]    Chief Complaint   Patient presents with     RECHECK     HPI:  Aileen Raygoza is a 88 year old  (1935), who is being seen today for an episodic care visit at: [facility]) [25]. Today's concern is:   DJD left knee s/p TKA: on exam today patient is sitting up in w/c, states she has very little pain at rest, behind knee is stiff in AM improves throughout the day,  In therapy patient is walking > 350 feet using a RW with SBA  Anemia: Hgb 10.6 on 6/19/23  Constipation: states bowels are working  HTN/PAF: /71, 148/92, 152/84  with HR 90 range denies CP, palpitations, SOB       Allergies, and PMH/PSH reviewed in [facility] today.  REVIEW OF SYSTEMS:  10 point ROS of systems including Constitutional, Eyes, Respiratory, Cardiovascular, Gastroenterology, Genitourinary, Integumentary, Musculoskeletal, Psychiatric were all negative except for pertinent positives noted in my HPI.    Objective:   /71   Pulse 97   Temp 98  F (36.7  C)   Resp 16   Ht 1.626 m (5' 4\")   Wt 73.8 kg (162 lb 12.8 oz)   LMP  (LMP Unknown)   SpO2 95%   BMI 27.94 kg/m    GENERAL APPEARANCE:  Alert, in no distress  ENT:  Mouth and posterior oropharynx normal, moist mucous membranes, normal hearing acuity  EYES:  EOM, conjunctivae, lids, pupils and irises normal, PERRL  RESP:  respiratory effort and palpation of chest normal, lungs clear to auscultation , no respiratory distress  CV:  Palpation and auscultation of heart done , regular rate and rhythm, no murmur, rub, or gallop, peripheral edema trace+ in LLE  ABDOMEN:  normal bowel sounds, soft, nontender, no hepatosplenomegaly or other masses  M/S:   patient sitting up in w/c  SKIN:  Inspection of skin and subcutaneous tissue baseline, incision is intact, no signs of infection  NEURO:   speech wnl  PSYCH:  affect and mood normal    Recent labs in " [facility] reviewed by me today.  and   Most Recent 3 CBC's:Recent Labs   Lab Test 06/14/23  0638 06/13/23  0627 06/05/23  0941 12/07/21  1041 12/07/21  1041 04/03/19  1002   WBC  --   --  5.3  --  5.4 4.4   HGB 12.0 12.1 14.0   < > 13.4 14.4   MCV  --   --  95  --  97 94   PLT  --   --  231  --  229 201    < > = values in this interval not displayed.     Most Recent 3 BMP's:Recent Labs   Lab Test 06/14/23  0638 06/13/23  0545 06/12/23  0555 06/05/23  0941 02/14/23  1401     --   --  141 140   POTASSIUM 4.1  --  4.3 4.0 4.1   CHLORIDE 105  --   --  105 105   CO2 21*  --   --  25 22   BUN 9.8  --   --  10.8 15.0   CR 0.45*  --  0.71 0.64 0.68   ANIONGAP 11  --   --  11 13   NITESH 8.2*  --   --  9.3 9.3   *  114* 106* 100* 105* 144*       Assessment/Plan:  (M17.12) Primary osteoarthritis of left knee  (primary encounter diagnosis)  (Z47.1,  Z96.652) Aftercare following left knee joint replacement surgery  Comment: acute/ongoing, no change  Plan: PT and OT, continue  tylenol 1000mg TID scheduled and qhs prn, continue oxycodone 5mg q 4 hours prn  F/u with ortho as directed  On xarelto 20mg QD      (K59.03) Drug-induced constipation  Comment: acute/ongoing, no change  Plan: continue senna s 1 PO BID     (I10) Benign essential hypertension  (I48.0) PAF (paroxysmal atrial fibrillation) (H)  Comment: acute/ongoing, no change  Plan: follow BMP, continue lisinopril 20mg QD, xarelto 20mg QD       MED REC REQUIRED  Post Medication Reconciliation Status: medication reconcilation previously completed during another office visit      Orders:  No new orders      Electronically signed by: Tonya Lynn Haase, APRN CNP             Sincerely,        Tonya Lynn Haase, APRN CNP

## 2023-06-21 NOTE — PROGRESS NOTES
"[facility] GERIATRICS    Chief Complaint   Patient presents with     RECHECK     HPI:  Aileen Raygoza is a 88 year old  (1935), who is being seen today for an episodic care visit at: [facility] ([facility]) [25]. Today's concern is:   DJD left knee s/p TKA: on exam today patient is sitting up in w/c, states she has very little pain at rest, behind knee is stiff in AM improves throughout the day,  In therapy patient is walking > 350 feet using a RW with SBA  Anemia: Hgb 10.6 on 6/19/23  Constipation: states bowels are working  HTN/PAF: /71, 148/92, 152/84  with HR 90 range denies CP, palpitations, SOB       Allergies, and PMH/PSH reviewed in [facility] today.  REVIEW OF SYSTEMS:  10 point ROS of systems including Constitutional, Eyes, Respiratory, Cardiovascular, Gastroenterology, Genitourinary, Integumentary, Musculoskeletal, Psychiatric were all negative except for pertinent positives noted in my HPI.    Objective:   /71   Pulse 97   Temp 98  F (36.7  C)   Resp 16   Ht 1.626 m (5' 4\")   Wt 73.8 kg (162 lb 12.8 oz)   LMP  (LMP Unknown)   SpO2 95%   BMI 27.94 kg/m    GENERAL APPEARANCE:  Alert, in no distress  ENT:  Mouth and posterior oropharynx normal, moist mucous membranes, normal hearing acuity  EYES:  EOM, conjunctivae, lids, pupils and irises normal, PERRL  RESP:  respiratory effort and palpation of chest normal, lungs clear to auscultation , no respiratory distress  CV:  Palpation and auscultation of heart done , regular rate and rhythm, no murmur, rub, or gallop, peripheral edema trace+ in LLE  ABDOMEN:  normal bowel sounds, soft, nontender, no hepatosplenomegaly or other masses  M/S:   patient sitting up in w/c  SKIN:  Inspection of skin and subcutaneous tissue baseline, incision is intact, no signs of infection  NEURO:   speech wnl  PSYCH:  affect and mood normal    Recent labs in [facility] reviewed by me today.  and   Most Recent 3 CBC's:Recent Labs   Lab Test 06/14/23  0638 " 06/13/23  0627 06/05/23  0941 12/07/21  1041 12/07/21  1041 04/03/19  1002   WBC  --   --  5.3  --  5.4 4.4   HGB 12.0 12.1 14.0   < > 13.4 14.4   MCV  --   --  95  --  97 94   PLT  --   --  231  --  229 201    < > = values in this interval not displayed.     Most Recent 3 BMP's:Recent Labs   Lab Test 06/14/23  0638 06/13/23  0545 06/12/23  0555 06/05/23  0941 02/14/23  1401     --   --  141 140   POTASSIUM 4.1  --  4.3 4.0 4.1   CHLORIDE 105  --   --  105 105   CO2 21*  --   --  25 22   BUN 9.8  --   --  10.8 15.0   CR 0.45*  --  0.71 0.64 0.68   ANIONGAP 11  --   --  11 13   NITESH 8.2*  --   --  9.3 9.3   *  114* 106* 100* 105* 144*       Assessment/Plan:  (M17.12) Primary osteoarthritis of left knee  (primary encounter diagnosis)  (Z47.1,  Z96.652) Aftercare following left knee joint replacement surgery  Comment: acute/ongoing, no change  Plan: PT and OT, continue  tylenol 1000mg TID scheduled and qhs prn, continue oxycodone 5mg q 4 hours prn  F/u with ortho as directed  On xarelto 20mg QD      (K59.03) Drug-induced constipation  Comment: acute/ongoing, no change  Plan: continue senna s 1 PO BID     (I10) Benign essential hypertension  (I48.0) PAF (paroxysmal atrial fibrillation) (H)  Comment: acute/ongoing, no change  Plan: follow BMP, continue lisinopril 20mg QD, xarelto 20mg QD       MED REC REQUIRED  Post Medication Reconciliation Status: medication reconcilation previously completed during another office visit      Orders:  No new orders      Electronically signed by: Tonya Lynn Haase, APRN CNP

## 2023-06-23 ENCOUNTER — DISCHARGE SUMMARY NURSING HOME (OUTPATIENT)
Dept: GERIATRICS | Facility: CLINIC | Age: 88
End: 2023-06-23
Payer: MEDICARE

## 2023-06-23 VITALS
SYSTOLIC BLOOD PRESSURE: 154 MMHG | RESPIRATION RATE: 16 BRPM | BODY MASS INDEX: 27.79 KG/M2 | HEART RATE: 98 BPM | WEIGHT: 162.8 LBS | TEMPERATURE: 97.8 F | DIASTOLIC BLOOD PRESSURE: 82 MMHG | OXYGEN SATURATION: 93 % | HEIGHT: 64 IN

## 2023-06-23 DIAGNOSIS — M17.12 PRIMARY OSTEOARTHRITIS OF LEFT KNEE: Primary | ICD-10-CM

## 2023-06-23 DIAGNOSIS — Z96.652 AFTERCARE FOLLOWING LEFT KNEE JOINT REPLACEMENT SURGERY: ICD-10-CM

## 2023-06-23 DIAGNOSIS — K59.03 DRUG-INDUCED CONSTIPATION: ICD-10-CM

## 2023-06-23 DIAGNOSIS — I10 BENIGN ESSENTIAL HYPERTENSION: ICD-10-CM

## 2023-06-23 DIAGNOSIS — Z47.1 AFTERCARE FOLLOWING LEFT KNEE JOINT REPLACEMENT SURGERY: ICD-10-CM

## 2023-06-23 DIAGNOSIS — D62 ANEMIA DUE TO BLOOD LOSS, ACUTE: ICD-10-CM

## 2023-06-23 DIAGNOSIS — I48.0 PAF (PAROXYSMAL ATRIAL FIBRILLATION) (H): ICD-10-CM

## 2023-06-23 PROCEDURE — 99316 NF DSCHRG MGMT 30 MIN+: CPT | Performed by: NURSE PRACTITIONER

## 2023-06-23 NOTE — PROGRESS NOTES
[facility] GERIATRICS DISCHARGE SUMMARY  PATIENT'S NAME: Aileen Raygoza  YOB: 1935  MEDICAL RECORD NUMBER:  [id number]  Place of Service where encounter took place:  [facility]) [25]    PRIMARY CARE PROVIDER AND CLINIC RESPONSIBLE AFTER TRANSFER:   Katie Mcdaniel MD, [address] [place] [address] / [facility] [address]    [facility] Provider     Transferring providers: Tonya Lynn Haase, APRN CNP, Dr. Shawn Sandoval MD  Recent Hospitalization/ED:  [facility] stay 6/12/23 to 6/15/23.  Date of SNF Admission: Kaylyn 15, 2023  Date of SNF (anticipated) Discharge: June 24, 2023  Discharged to: previous independent home  Cognitive Scores: BIMS: 13/15 and Short blessed: 4/28  Physical Function: Ambulating 350 ft with RW independentl  DME: Walker    CODE STATUS/ADVANCE DIRECTIVES DISCUSSION:  Prior   ALLERGIES: Simvastatin    NURSING FACILITY COURSE   Medication Changes/Rationale:     See assessment and plan    Summary of nursing facility stay:   Patient progressed in therapy to walking > 350 feet using a RW independently, patient independent with ADL's, will DC to home with home PT, OT and HHA through [facility]    Discharge Medications:  MED REC REQUIRED  Post Medication Reconciliation Status: discharge medications reconciled and changed, per note/orders       Current Outpatient Medications   Medication Sig Dispense Refill     acetaminophen (TYLENOL) 500 MG tablet Take 2 tablets (1,000 mg) by mouth 3 times daily       atorvastatin (LIPITOR) 20 MG tablet Take 1 tablet (20 mg) by mouth daily 90 tablet 2     Calcium Carbonate-Vitamin D (CALCIUM + D PO) Take 1 tablet by mouth daily       Cholecalciferol (VITAMIN D PO) Take 1 tablet by mouth daily       lisinopril (ZESTRIL) 20 MG tablet Take 1 tablet (20 mg) by mouth daily 90 tablet 2     Multiple Vitamins-Minerals (PRESERVISION AREDS 2 PO) Take 1 capsule by mouth 2 times daily       rivaroxaban ANTICOAGULANT (XARELTO) 20 MG TABS  "tablet Take 1 tablet (20 mg) by mouth daily (with dinner) 90 tablet 3     senna-docusate (SENOKOT-S/PERICOLACE) 8.6-50 MG tablet Take 1 tablet by mouth 2 times daily Take while on oral narcotics to prevent or treat constipation. 30 tablet 0        MED REC REQUIRED  Post Medication Reconciliation Status: discharge medications reconciled and changed, per note/orders     Controlled medications:   not applicable/none     Past Medical History:   Past Medical History:   Diagnosis Date     Benign essential hypertension      Impaired fasting glucose      PAF (paroxysmal atrial fibrillation) (H)      Pure hypercholesterolemia      Physical Exam:   Vitals: BP (!) 154/82   Pulse 98   Temp 97.8  F (36.6  C)   Resp 16   Ht 1.626 m (5' 4\")   Wt 73.8 kg (162 lb 12.8 oz)   LMP  (LMP Unknown)   SpO2 93%   BMI 27.94 kg/m    BMI: Body mass index is 27.94 kg/m .  GENERAL APPEARANCE:  Alert, in no distress  ENT:  Mouth and posterior oropharynx normal, moist mucous membranes, normal hearing acuity  EYES:  EOM, conjunctivae, lids, pupils and irises normal, PERRL  RESP:  respiratory effort and palpation of chest normal, lungs clear to auscultation , no respiratory distress  CV:  Palpation and auscultation of heart done , regular rate and rhythm, no murmur, rub, or gallop, peripheral edema trace+ in LLE  ABDOMEN:  normal bowel sounds, soft, nontender, no hepatosplenomegaly or other masses  M/S:   Examination of:   patient sitting up on edge of bed  .  SKIN:  Inspection of skin and subcutaneous tissue baseline, did not visualize surgical incision  NEURO:   speech wnl  PSYCH:  affect and mood normal     SNF labs: Recent labs in [facility] reviewed by me today.  and   Most Recent 3 CBC's:  Recent Labs   Lab Test 06/14/23  0638 06/13/23  0627 06/05/23  0941 12/07/21  1041 12/07/21  1041 04/03/19  1002   WBC  --   --  5.3  --  5.4 4.4   HGB 12.0 12.1 14.0   < > 13.4 14.4   MCV  --   --  95  --  97 94   PLT  --   --  231  --  229 201    < > = " values in this interval not displayed.     Most Recent 3 BMP's:  Recent Labs   Lab Test 06/14/23  0638 06/13/23  0545 06/12/23  0555 06/05/23  0941 02/14/23  1401     --   --  141 140   POTASSIUM 4.1  --  4.3 4.0 4.1   CHLORIDE 105  --   --  105 105   CO2 21*  --   --  25 22   BUN 9.8  --   --  10.8 15.0   CR 0.45*  --  0.71 0.64 0.68   ANIONGAP 11  --   --  11 13   NITESH 8.2*  --   --  9.3 9.3   *  114* 106* 100* 105* 144*       Assessment/Plan:  (M17.12) Primary osteoarthritis of left knee  (primary encounter diagnosis)  (Z47.1,  Z96.652) Aftercare following left knee joint replacement surgery  Comment: ongoing  Plan: DC to home with home PT, OT and HHA through [facility], continue  tylenol 1000mg q 6 hours prn  DC oxycodone at discharge  F/u with ortho as directed  On xarelto 20mg QD      (K59.03) Drug-induced constipation  Comment: ongoing  Plan: continue senna s 1 PO BID     (I10) Benign essential hypertension  (I48.0) PAF (paroxysmal atrial fibrillation) (H)  Comment: ongoing  Plan: continue lisinopril 20mg QD, xarelto 20mg QD       DISCHARGE PLAN:    Follow up labs: No labs orders/due    Medical Follow Up:      Follow up with primary care provider in 1 weeks    [facility] scheduled appointments:     Future Appointments   Date Time Provider Department Center   7/10/2023  1:30 PM Katie Mcdaniel MD [facility]         Discharge Services: Home Care:  Occupational Therapy, Physical Therapy and Home Health Aide    Discharge Instructions Verbalized to Patient at Discharge:     None    TOTAL DISCHARGE TIME:   Greater than 30 minutes  Electronically signed by:  Tonya Lynn Haase, APRN CNP

## 2023-06-23 NOTE — LETTER
6/23/2023        RE: Aileen Raygoza  [address]  [facility] [address]        [facility] GERIATRICS DISCHARGE SUMMARY  PATIENT'S NAME: Aileen Raygoza  YOB: 1935  MEDICAL RECORD NUMBER:  [id number]  Place of Service where encounter took place:  [facility]) [25]    PRIMARY CARE PROVIDER AND CLINIC RESPONSIBLE AFTER TRANSFER:   Katie Mcdaniel MD, [address]  / [facility] [address]    [facility] Provider     Transferring providers: Tonya Lynn Haase, APRN CNP, Dr. Shawn Sandoval MD  Recent Hospitalization/ED:  [facility] Hospital stay 6/12/23 to 6/15/23.  Date of SNF Admission: Kaylyn 15, 2023  Date of SNF (anticipated) Discharge: June 24, 2023  Discharged to: previous independent home  Cognitive Scores: BIMS: 13/15 and Short blessed: 4/28  Physical Function: Ambulating 350 ft with RW independentl  DME: Walker    CODE STATUS/ADVANCE DIRECTIVES DISCUSSION:  Prior   ALLERGIES: Simvastatin    NURSING FACILITY COURSE   Medication Changes/Rationale:     See assessment and plan    Summary of nursing facility stay:   Patient progressed in therapy to walking > 350 feet using a RW independently, patient independent with ADL's, will DC to home with home PT, OT and HHA through [facility]    Discharge Medications:  MED REC REQUIRED  Post Medication Reconciliation Status: discharge medications reconciled and changed, per note/orders       Current Outpatient Medications   Medication Sig Dispense Refill     acetaminophen (TYLENOL) 500 MG tablet Take 2 tablets (1,000 mg) by mouth 3 times daily       atorvastatin (LIPITOR) 20 MG tablet Take 1 tablet (20 mg) by mouth daily 90 tablet 2     Calcium Carbonate-Vitamin D (CALCIUM + D PO) Take 1 tablet by mouth daily       Cholecalciferol (VITAMIN D PO) Take 1 tablet by mouth daily       lisinopril (ZESTRIL) 20 MG tablet Take 1 tablet (20 mg) by mouth daily 90 tablet 2     Multiple Vitamins-Minerals (PRESERVISION AREDS 2  "PO) Take 1 capsule by mouth 2 times daily       rivaroxaban ANTICOAGULANT (XARELTO) 20 MG TABS tablet Take 1 tablet (20 mg) by mouth daily (with dinner) 90 tablet 3     senna-docusate (SENOKOT-S/PERICOLACE) 8.6-50 MG tablet Take 1 tablet by mouth 2 times daily Take while on oral narcotics to prevent or treat constipation. 30 tablet 0        MED REC REQUIRED  Post Medication Reconciliation Status: discharge medications reconciled and changed, per note/orders     Controlled medications:   not applicable/none     Past Medical History:   Past Medical History:   Diagnosis Date     Benign essential hypertension      Impaired fasting glucose      PAF (paroxysmal atrial fibrillation) (H)      Pure hypercholesterolemia      Physical Exam:   Vitals: BP (!) 154/82   Pulse 98   Temp 97.8  F (36.6  C)   Resp 16   Ht 1.626 m (5' 4\")   Wt 73.8 kg (162 lb 12.8 oz)   LMP  (LMP Unknown)   SpO2 93%   BMI 27.94 kg/m    BMI: Body mass index is 27.94 kg/m .  GENERAL APPEARANCE:  Alert, in no distress  ENT:  Mouth and posterior oropharynx normal, moist mucous membranes, normal hearing acuity  EYES:  EOM, conjunctivae, lids, pupils and irises normal, PERRL  RESP:  respiratory effort and palpation of chest normal, lungs clear to auscultation , no respiratory distress  CV:  Palpation and auscultation of heart done , regular rate and rhythm, no murmur, rub, or gallop, peripheral edema trace+ in LLE  ABDOMEN:  normal bowel sounds, soft, nontender, no hepatosplenomegaly or other masses  M/S:   Examination of:   patient sitting up on edge of bed  .  SKIN:  Inspection of skin and subcutaneous tissue baseline, did not visualize surgical incision  NEURO:   speech wnl  PSYCH:  affect and mood normal     SNF labs: Recent labs in [facility] reviewed by me today.  and   Most Recent 3 CBC's:  Recent Labs   Lab Test 06/14/23  0638 06/13/23  0627 06/05/23  0941 12/07/21  1041 12/07/21  1041 04/03/19  1002   WBC  --   --  5.3  --  5.4 4.4   HGB 12.0 12.1 " 14.0   < > 13.4 14.4   MCV  --   --  95  --  97 94   PLT  --   --  231  --  229 201    < > = values in this interval not displayed.     Most Recent 3 BMP's:  Recent Labs   Lab Test 06/14/23  0638 06/13/23  0545 06/12/23  0555 06/05/23  0941 02/14/23  1401     --   --  141 140   POTASSIUM 4.1  --  4.3 4.0 4.1   CHLORIDE 105  --   --  105 105   CO2 21*  --   --  25 22   BUN 9.8  --   --  10.8 15.0   CR 0.45*  --  0.71 0.64 0.68   ANIONGAP 11  --   --  11 13   NITESH 8.2*  --   --  9.3 9.3   *  114* 106* 100* 105* 144*       Assessment/Plan:  (M17.12) Primary osteoarthritis of left knee  (primary encounter diagnosis)  (Z47.1,  Z96.652) Aftercare following left knee joint replacement surgery  Comment: ongoing  Plan: DC to home with home PT, OT and HHA through [facility], continue  tylenol 1000mg q 6 hours prn  DC oxycodone at discharge  F/u with ortho as directed  On xarelto 20mg QD      (K59.03) Drug-induced constipation  Comment: ongoing  Plan: continue senna s 1 PO BID     (I10) Benign essential hypertension  (I48.0) PAF (paroxysmal atrial fibrillation) (H)  Comment: ongoing  Plan: continue lisinopril 20mg QD, xarelto 20mg QD       DISCHARGE PLAN:    Follow up labs: No labs orders/due    Medical Follow Up:      Follow up with primary care provider in 1 weeks    [facility] scheduled appointments:     Future Appointments   Date Time Provider Department Center   7/10/2023  1:30 PM Katie Mcdaniel MD [facility]         Discharge Services: Home Care:  Occupational Therapy, Physical Therapy and Home Health Aide    Discharge Instructions Verbalized to Patient at Discharge:     None    TOTAL DISCHARGE TIME:   Greater than 30 minutes  Electronically signed by:  Tonya Lynn Haase, APRN CNP                     Sincerely,        Tonya Lynn Haase, APRN CNP

## 2023-06-26 ENCOUNTER — TELEPHONE (OUTPATIENT)
Dept: INTERNAL MEDICINE | Facility: CLINIC | Age: 88
End: 2023-06-26
Payer: MEDICARE

## 2023-06-26 NOTE — TELEPHONE ENCOUNTER
Home Care is calling regarding an established patient with [facility].     Requesting orders from: Katie Mcdaniel  Provider is following patient: Yes  Is this a 60-day recertification request?  Yes    Orders Requested  Delay of call visit due to agency staffing. HC can see pt 06/27  Physical Therapy  Occupational Therapy  HHA      Confirmed ok to leave a detailed message with call back.  Contact information confirmed and updated as needed.    Oren Luis RN

## 2023-06-27 NOTE — DISCHARGE SUMMARY
HOSPITAL DISCHARGE SUMMARY    Patient Name: Aileen Raygoza  YOB: 1935  Age: 88 year old  Medical Record Number: [id number]  Primary Physician: Katie Mcdaniel  Phone: [phone]  Admission Date: 6/12/2023  Discharge Date: 6/15/2023    She will be discharged from [facility] to [facility]    PRINCIPAL DISCHARGE DIAGNOSIS: right kneeDJD  Patient Active Problem List   Diagnosis     PAF (paroxysmal atrial fibrillation) (H)     Pure hypercholesterolemia     Benign essential hypertension     Impaired fasting glucose     S/P total knee arthroplasty, left        PROCEDURES PERFORMED DURING HOSPITALIZATION: Total knee arthroplasty right side    BRIEF HOSPITAL COURSE: This is a pleasant 88 year old female who has had persistent complaints of pain that has failed non-operative management. Preoperative x-rays revealed arthritic changes in the right knee.  The risks, benefits and possible complications of the above procedure were discussed with the patient. Patient elected to proceed to improve their lifestyle. Informed consent was obtained. For further details, please refer to the H&P in the chart.    The patient was admitted on 6/12/2023 and underwent the above-mentioned procedure. Patient tolerated this procedure well. Postoperatively, patient was seen in consultation by the internal medicine hospitalist service. Throughout the hospitalization, wound remained clean. The patient was started and advanced in a diet. CMS remained intact. Patient was seen and evaluated by physical therapy and rehab program was initiated. Patient was also seen by occupational therapy. Hemoglobin on day of discharge was stable.    COMPLICATIONS IN HOSPITAL: Aileen did well post operatively except at times had periods of confusion.  This improved with minimizing narcotics.  Confusion had resolved by time of discharge.    PERTINENT FINDINGS/RESULTS AT DISCHARGE:   Blood pressure 109/73, pulse  "100, temperature 98.8  F (37.1  C), temperature source Oral, resp. rate 16, height 1.626 m (5' 4\"), weight 72.1 kg (159 lb), SpO2 95 %, not currently breastfeeding.      Subjective: Patient feels good today.  Pain controlled. Discharge instructions reviewed.      PE:   The patient is awake and alert  Calves are soft and non-tender.   Sensation is intact.  Dorsiflexion and plantar flexion is intact.  Foot warm with nl cap refill.  The incision is incision: covered.      Latest Laboratory Results:  Chem:  Recent Labs   Lab Test 06/14/23  0638 06/12/23  0555   POTASSIUM 4.1 4.3     WBC/Hgb:  Recent Labs   Lab Test 06/14/23  0638 06/13/23  0627 06/05/23  0941 12/07/21  1041   WBC  --   --  5.3 5.4   HGB 12.0 12.1 14.0 13.4     INR:  No results for input(s): INR in the last 66167 hours.  No components found for: [id number]    IMPORTANT PENDING TEST RESULTS: None    CONDITION AT DISCHARGE:    Stabilized    DISCHARGE ORDERS    Discharge Orders        General info for SNF    Length of Stay Estimate: Short Term Care: Estimated # of Days <30 Condition at Discharge: Stable Level of care:skilled  Rehabilitation Potential: Good Admission H&P remains valid and up-to-date: Yes Recent Chemotherapy: N/A Use Nursing Home Standing Orders: Yes     Mantoux Instructions    Give two-step Mantoux (PPD) Per Facility Policy {.:[id number]     Incentive Spirometry    Incentive Spirometry 10 times per hour, 4 times per day.     Reason for your hospital stay    Left total knee replacement     When to call - Contact Surgeon Team    You may experience symptoms that require follow-up before your scheduled appointment. Refer to the \"Stoplight Tool\" for instructions on when to contact your Surgeon Team if you are concerned about pain control, blood clots, constipation, or if you are unable to urinate.     When to call - Reach out to Urgent Care    If you are not able to reach your Surgeon Team and you need immediate care, go to the Orthopedic Walk-in " Clinic or Urgent Care at your Surgeon's office.  Do NOT go to the Emergency Room unless you have shortness of breath, chest pain, or other signs of a medical emergency.     When to call - Reasons to Call 911    Call 911 immediately if you experience sudden-onset chest pain, arm weakness/numbness, slurred speech, or shortness of breath     Symptoms - Fever Management    A low grade fever can be expected after surgery.  Use acetaminophen (TYLENOL) as needed for fever management.  Contact your Surgeon Team if you have a fever greater than 101.5 F, chills, and/or night sweats.     Symptoms - Constipation management    Constipation (hard, dry bowel movements) is expected after surgery due to the combination of being less active, the anesthetic, and the opioid pain medication.  You can do the following to help reduce constipation:  ~  FLUIDS:  Drink clear liquids (water or Gatorade), or juice (apple/prune).  ~  DIET:  Eat a fiber rich diet.    ~  ACTIVITY:  Get up and move around several times a day.  Increase your activity as you are able.  MEDICATIONS:  Reduce the risk of constipation by starting medications before you are constipated.  You can take Miralax   (1 packet as directed) and/or a stool softener (Senokot 1-2 tablets 1-2 times a day).  If you already have constipation and these medications are not working, you can get magnesium citrate and use as directed.  If you continue to have constipation you can try an over the counter suppository or enema.  Call your Surgeon Team if it has been greater than 3 days since your last bowel movement.     Symptoms - Reduced Urine Output    Changes in the amount of fluids you drank before and after surgery may result in problems urinating.  It is important to stay well-hydrated after surgery and drink plenty of water. If it has been greater than 8 hours since you have urinated despite drinking plenty of water, call your Surgeon Team.     Activity - Exercises to prevent blood  clots    Unless otherwise directed by your Surgeon team, perform the following exercises at least three times per day for the first four weeks after surgery to prevent blood clots in your legs: 1) Point and flex your feet (Ankle Pumps), 2) Move your ankle around in big circles, 3) Wiggle your toes, 4) Walk, even for short distances, several times a day, will help decrease the risk of blood clots.     Comfort and Pain Management - Pain after Surgery    Pain after surgery is normal and expected.  You will have some amount of pain for several weeks after surgery.  Your pain will improve with time.  There are several things you can do to help reduce your pain including: rest, ice, elevation, and using pain medications as needed. Contact your Surgeon Team if you have pain that persists or worsens after surgery despite rest, ice, elevation, and taking your medication(s) as prescribed. Contact your Surgeon Team if you have new numbness, tingling, or weakness in your operative extremity.     Comfort and Pain Management - Swelling after Surgery    Swelling and/or bruising of the surgical extremity is common and may persist for several months after surgery. In addition to frequent icing and elevation, gentle compressive support with an ACE wrap or tubigrip may help with swelling. Apply compression regularly, removing at least twice daily to perform skin checks. Contact your Surgeon Team if your swelling increases and is NOT associated with an increase in your activity level, or if your swelling increases and is associated with redness and pain.     Comfort and Pain Management - Cold therapy    Ice can be used to control swelling and discomfort after surgery. Place a thin towel over your operative site and apply the ice pack overtop. Leave ice pack in place for 20 minutes, then remove for 20 minutes. Repeat this 20 minutes on/20 minutes off routine as often as tolerated.     Medication Instructions - Acetaminophen (TYLENOL)  Instructions    You were discharged with acetaminophen (TYLENOL) for pain management after surgery. Acetaminophen most effectively manages pain symptoms when it is taken on a schedule without missing doses (every four, six, or eight hours). Your Provider will prescribe a safe daily dose between 3000 - 4000 mg.  Do NOT exceed this daily dose. Most patients use acetaminophen for pain control for the first four weeks after surgery.  You can wean from this medication as your pain decreases.     Medication Instructions - NSAID Instructions    You were discharged with an anti-inflammatory medication for pain management to use in combination with acetaminophen (TYLENOL) and the narcotic pain medication.  Take this medication exactly as directed.  You should only take one anti-inflammatory at a time.  Some common anti-inflammatories include: ibuprofen (ADVIL, MOTRIN), naproxen (ALEVE, NAPROSYN), celecoxib (CELEBREX), meloxicam (MOBIC), ketorolac (TORADOL).  Take this medication with food and water.     Medication Instructions - Opioids - Tapering Instructions    In the first three days following surgery, your symptoms may warrant use of the narcotic pain medication every four to six hours as prescribed. This is normal. As your pain symptoms improve, focus your efforts on decreasing (tapering) use of narcotic medications. The most successful tapering strategy is to first, decrease the number of tablets you take every 4-6 hours to the minimum prescribed. Then, increase the amount of time between doses.  For example:  First, taper to   or 1 tablet every 4-6 hours.  Then, taper to   or 1 tablet every 6-8 hours.  Then, taper to   or 1 tablet every 8-10 hours.  Then, taper to   or 1 tablet every 10-12 hours.  Then, taper to   or 1 tablet at bedtime.  The bedtime dose can help with comfort during sleep and is typically the last dose to be discontinued after surgery.     Follow Up Care    Follow-up with your Surgeon Team in 2 weeks  "for wound check.     Shower with wound/dressing NOT covered    You do not need to cover your dressing or incision in the shower, you may allow water and soap to run over top of the surgical dressing or incision. You may shower 2 days after surgery.  You are strictly prohibited from soaking or submerging the surgical wound underwater.     Medication instructions - Anticoagulation - other    Continue on Xarelto. This is given to help minimize your risk of blood clot     Comfort and Pain Management - LOWER Extremity Elevation    Swelling is expected for several months after surgery. This type of swelling is usually associated with gravity and activity, and can be improved with elevation.   The best way to do this is to get your \"toes above your nose\" by laying down and placing several pillows lengthwise under your calf and heel. When elevating your leg keep your knee completely straight. Perform this elevation as often as possible especially for the first two weeks after surgery.     Medication Instructions - Opioid Instructions (Less than 65 years)    You were discharged with an opioid medication (hydromorphone, oxycodone, hydrocodone, or tramadol). This medication should only be taken for breakthrough pain that is not controlled with acetaminophen (TYLENOL). If you rate your pain less than 3 you do not need this medication.  Pain rating 0-3:  You do not need this medication.  Pain rating 4-6:  Take 1 tablet every 4-6 hours as needed  Pain rating 7-10:  Take 2 tablets every 4-6 hours as needed.  Do not exceed 6 tablets per day     No VTE Prophylaxis     Physical Therapy Adult Consult    Evaluate and treat as clinically indicated.    Reason: Status Post Knee Surgery     Occupational Therapy Adult Consult    Evaluate and treat as clinically indicated.    Reason: Status Post Knee Surgery     Crutches DME    DME Documentation: Describe the reason for need to support medical necessity: Impaired gait status post knee " surgery. I, the undersigned, certify that the above prescribed supplies are medically necessary for this patient and is both reasonable and necessary in reference to accepted standards of medical practice in the treatment of this patient's condition and is not prescribed as a convenience.     Cane DME    DME Documentation: Describe the reason for need to support medical necessity: Impaired gait status post knee surgery. I, the undersigned, certify that the above prescribed supplies are medically necessary for this patient and is both reasonable and necessary in reference to accepted standards of medical practice in the treatment of this patient's condition and is not prescribed as a convenience.     Walker DME    DME Documentation: Describe the reason for need to support medical necessity: Impaired gait status post knee surgery. I, the undersigned, certify that the above prescribed supplies are medically necessary for this patient and is both reasonable and necessary in reference to accepted standards of medical practice in the treatment of this patient's condition and is not prescribed as a convenience.     Diet    Follow this diet upon discharge: Orders Placed This Encounter      Advance Diet as Tolerated: Regular Diet Adult       Discharge Medications     Discharge Medication List as of 6/15/2023 12:36 PM      START taking these medications    Details   acetaminophen (TYLENOL) 325 MG tablet Take 2 tablets (650 mg) by mouth every 4 hours as needed for other (mild pain), Disp-100 tablet, R-0, E-Prescribe      oxyCODONE (ROXICODONE) 5 MG tablet Take 1 tablet (5 mg) by mouth every 4 hours as needed for moderate to severe pain, Disp-33 tablet, R-0, Local Print      senna-docusate (SENOKOT-S/PERICOLACE) 8.6-50 MG tablet Take 1-2 tablets by mouth 2 times daily Take while on oral narcotics to prevent or treat constipation., Disp-30 tablet, R-0, E-PrescribeWhile taking narcotics         CONTINUE these medications which  have NOT CHANGED    Details   atorvastatin (LIPITOR) 20 MG tablet Take 1 tablet (20 mg) by mouth daily, Disp-90 tablet, R-2, E-Prescribe      Calcium Carbonate-Vitamin D (CALCIUM + D PO) Take 1 tablet by mouth daily, Historical      Cholecalciferol (VITAMIN D PO) Take 1 tablet by mouth daily, Historical      lisinopril (ZESTRIL) 20 MG tablet Take 1 tablet (20 mg) by mouth daily, Disp-90 tablet, R-2, E-Prescribe      Multiple Vitamins-Minerals (PRESERVISION AREDS 2 PO) Take 1 capsule by mouth 2 times daily, Historical      rivaroxaban ANTICOAGULANT (XARELTO) 20 MG TABS tablet Take 1 tablet (20 mg) by mouth daily (with dinner), Disp-90 tablet, R-3, E-Prescribe      diphenhydrAMINE-acetaminophen (TYLENOL PM)  MG tablet Take 1 tablet by mouth At Bedtime, Historical               After Discharge Recommendations:  1. Resume pre-admission diet  2. DVT prophylaxis: Xarelto  3. Follow up: She should see Dr. Blas in clinic in 1-2wks.  4. Sutures or staples will be removed by orthopedic surgeon at the 10-14 day follow-up appointment.  5. Weight Bearing WBAT (Weight bearing as tolerated).  6. Additional followup: None  7. Special instructions: None    Total time spent for discharge on date of discharge: 25 minutes    Physician(s) in addition to primary physician who should receive a copy:  Primary Care Physician Katie Mcdaniel  Orthopedic Medicine and Surgery -[phone]    WILL Jean...................  6/27/2023   6:56 AM

## 2023-06-27 NOTE — TELEPHONE ENCOUNTER
Called and left message for Makayla with the provider's message. Advised Makayla to call the clinic back with any additional questions.     Cassie Johnson RN

## 2023-07-05 ENCOUNTER — TELEPHONE (OUTPATIENT)
Dept: INTERNAL MEDICINE | Facility: CLINIC | Age: 88
End: 2023-07-05
Payer: MEDICARE

## 2023-07-05 NOTE — TELEPHONE ENCOUNTER
Called and spoke to Jayne. Relayed message from the provider. Jayne expressed understanding and had no additional questions at this time.     Cassie Johnson RN

## 2023-07-05 NOTE — TELEPHONE ENCOUNTER
Home Care is calling regarding an established patient with [facility].     Requesting orders from: Katie Mcdaniel  Provider is following patient: Yes  Is this a 60-day recertification request?  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    Confirmed ok to leave a detailed message with call back.  Contact information confirmed and updated as needed.    Oren Luis RN

## 2023-07-10 ENCOUNTER — OFFICE VISIT (OUTPATIENT)
Dept: INTERNAL MEDICINE | Facility: CLINIC | Age: 88
End: 2023-07-10
Payer: MEDICARE

## 2023-07-10 VITALS
TEMPERATURE: 97.9 F | HEART RATE: 104 BPM | BODY MASS INDEX: 26.78 KG/M2 | DIASTOLIC BLOOD PRESSURE: 70 MMHG | WEIGHT: 156 LBS | OXYGEN SATURATION: 100 % | SYSTOLIC BLOOD PRESSURE: 132 MMHG

## 2023-07-10 DIAGNOSIS — Z96.652 S/P TOTAL KNEE ARTHROPLASTY, LEFT: ICD-10-CM

## 2023-07-10 DIAGNOSIS — I10 BENIGN ESSENTIAL HYPERTENSION: ICD-10-CM

## 2023-07-10 DIAGNOSIS — Z09 HOSPITAL DISCHARGE FOLLOW-UP: Primary | ICD-10-CM

## 2023-07-10 DIAGNOSIS — I48.0 PAF (PAROXYSMAL ATRIAL FIBRILLATION) (H): ICD-10-CM

## 2023-07-10 PROCEDURE — 99214 OFFICE O/P EST MOD 30 MIN: CPT | Performed by: INTERNAL MEDICINE

## 2023-07-10 NOTE — PROGRESS NOTES
ASSESSMENT/PLAN                                                      (Z09) Hospital discharge follow-up  (primary encounter diagnosis)  (Z96.652) S/P total knee arthroplasty, left  Comment: Clinically doing well.  Plan: no primary care interventions indicated at this time; continue home care services and follow-up with orthopedic surgery as planned.    (I48.0) PAF (paroxysmal atrial fibrillation) (H)  Comment: on chronic AC with Xarelto; not on rate controlling agent currently.    (I10) Benign essential hypertension  Comment: well-controlled on current regimen.      Katie Mcdaniel MD   [address] [address]  T: [phone], F: [phone]    SUBJECTIVE                                                      Aileen Raygoza is a very pleasant 88 year old female who presents for hospital/TCU discharge follow-up:    Patient underwent a left total knee arthroplasty 6/12/2023. Intraoperative and postoperative courses unremarkable. Discharged to TCU 6/15/2023 where she remained until 6/23/2023.  Discharged home with home care services.    Patient is doing well at home. Pain is reasonably well controlled with Tylenol. Mild left knee and lower leg swelling - applying cool compresses regularly and keeping leg elevated whenever seated or lying down. Appetite is returning to normal but energy is still on the lower side.  She is getting around well with a walker but is impatient for her independence.    OBJECTIVE                                                      /70   Pulse 104   Temp 97.9  F (36.6  C)   Wt 70.8 kg (156 lb)   LMP  (LMP Unknown)   SpO2 100%   BMI 26.78 kg/m    Constitutional: well-appearing  Respiratory: normal respiratory effort; clear to auscultation bilaterally  Cardiovascular: regular rate and rhythm; no edema  Musculoskeletal: walks with walker  Psych: normal judgment and insight; normal mood and affect; recent and remote memory  intact    ---    (Note documentation was completed, in part, with Editlite voice-recognition software. Documentation was reviewed, but some grammatical, spelling, and word errors may remain.)

## 2023-10-16 ENCOUNTER — TELEPHONE (OUTPATIENT)
Dept: INTERNAL MEDICINE | Facility: CLINIC | Age: 88
End: 2023-10-16
Payer: MEDICARE

## 2023-10-16 NOTE — TELEPHONE ENCOUNTER
While she may, technically, get a mammogram (it won't cause her any bodily harm), I do not recommend continued screening mammograms at her age.

## 2023-10-16 NOTE — TELEPHONE ENCOUNTER
Patient is asking if OK for her to get a mammogram. Patient had knee surgery on 6/12/23.     Patient states a friend who had heart surgery had to wait 6 months. Explained to the patient that was due to need for sternum to heal.    Patient asked that question be routed to Dr. Mcdaniel.    Please advise. Triage to call the patient back.  Geeta Magaña RN

## 2023-10-17 DIAGNOSIS — I10 BENIGN ESSENTIAL HYPERTENSION: ICD-10-CM

## 2023-10-17 DIAGNOSIS — Z79.01 ON CONTINUOUS ORAL ANTICOAGULATION: ICD-10-CM

## 2023-10-17 DIAGNOSIS — E78.00 PURE HYPERCHOLESTEROLEMIA: ICD-10-CM

## 2023-10-17 DIAGNOSIS — I48.21 PERMANENT ATRIAL FIBRILLATION (H): ICD-10-CM

## 2023-10-17 RX ORDER — LISINOPRIL 20 MG/1
20 TABLET ORAL DAILY
Qty: 90 TABLET | Refills: 2 | Status: SHIPPED | OUTPATIENT
Start: 2023-10-17 | End: 2024-01-08

## 2023-10-17 RX ORDER — ATORVASTATIN CALCIUM 20 MG/1
20 TABLET, FILM COATED ORAL DAILY
Qty: 90 TABLET | Refills: 2 | OUTPATIENT
Start: 2023-10-17

## 2023-10-17 RX ORDER — ATORVASTATIN CALCIUM 20 MG/1
20 TABLET, FILM COATED ORAL DAILY
Qty: 90 TABLET | Refills: 0 | Status: SHIPPED | OUTPATIENT
Start: 2023-10-17 | End: 2024-01-08

## 2023-10-17 NOTE — TELEPHONE ENCOUNTER
Prescription approved per [facility] Refill Protocol.  Cassie Johnson, RN  [facility] Triage Nurse

## 2023-10-17 NOTE — TELEPHONE ENCOUNTER
Patient called, still waiting on lisinopril & xarelto Rx. She only has 5 tabs left of lisinopril and Rx has to go to mail service. Requesting to be processed as high priority.

## 2023-10-18 NOTE — TELEPHONE ENCOUNTER
Patient Contact    Attempt # 2    Was call answered?  Yes.      Relayed provider message. Pt verbalizes understanding and is will call to schedule a mammogram.

## 2023-10-25 ENCOUNTER — TRANSFERRED RECORDS (OUTPATIENT)
Dept: HEALTH INFORMATION MANAGEMENT | Facility: CLINIC | Age: 88
End: 2023-10-25
Payer: MEDICARE

## 2023-12-30 NOTE — PROGRESS NOTES
Access to R pleural effusion.  Patient vital signs are at baseline: Yes  Patient able to ambulate as they were prior to admission or with assist devices provided by therapies during their stay:  Yes  Patient MUST void prior to discharge: Yes  Patient able to tolerate oral intake: Yes  Pain has adequate pain control using Oral analgesics:  Yes  Does patient have an identified : Yes  Has goal D/C date and time been discussed with patient:  Yes    Pt discharge to TCU @1300

## 2024-01-08 ENCOUNTER — TELEPHONE (OUTPATIENT)
Dept: CARDIOLOGY | Facility: CLINIC | Age: 89
End: 2024-01-08

## 2024-01-08 ENCOUNTER — OFFICE VISIT (OUTPATIENT)
Dept: CARDIOLOGY | Facility: CLINIC | Age: 89
End: 2024-01-08
Payer: MEDICARE

## 2024-01-08 VITALS
SYSTOLIC BLOOD PRESSURE: 132 MMHG | DIASTOLIC BLOOD PRESSURE: 72 MMHG | BODY MASS INDEX: 28.24 KG/M2 | HEIGHT: 63 IN | WEIGHT: 159.4 LBS | OXYGEN SATURATION: 95 % | HEART RATE: 86 BPM

## 2024-01-08 DIAGNOSIS — I48.0 PAF (PAROXYSMAL ATRIAL FIBRILLATION) (H): ICD-10-CM

## 2024-01-08 DIAGNOSIS — I10 BENIGN ESSENTIAL HYPERTENSION: Primary | ICD-10-CM

## 2024-01-08 DIAGNOSIS — E78.5 HYPERLIPIDEMIA LDL GOAL <70: ICD-10-CM

## 2024-01-08 PROCEDURE — 99214 OFFICE O/P EST MOD 30 MIN: CPT | Performed by: INTERNAL MEDICINE

## 2024-01-08 RX ORDER — LISINOPRIL 20 MG/1
20 TABLET ORAL DAILY
Qty: 100 TABLET | Refills: 5 | Status: SHIPPED | OUTPATIENT
Start: 2024-01-08 | End: 2024-01-15

## 2024-01-08 RX ORDER — ATORVASTATIN CALCIUM 20 MG/1
20 TABLET, FILM COATED ORAL DAILY
Qty: 100 TABLET | Refills: 6 | Status: SHIPPED | OUTPATIENT
Start: 2024-01-08 | End: 2024-01-23

## 2024-01-08 NOTE — TELEPHONE ENCOUNTER
Spoke with patient. She is the least interested in warfarin due to the lab visits and diet restrictions. She is very interested in the April 1 program for the cost of $85/month.    Mailed the information sheet from  Pharmacy to the patient and she will read that over. She will call back if she wants more information or help, but may opt to try the [facility] program first.

## 2024-01-08 NOTE — TELEPHONE ENCOUNTER
Patient has Medicare D through [employer].    Xarelto/Eliquis  --Patient will pay 100% of the first $545 in drugs costs (first month will be as much as $555)  --Subsequent fills will be $146/mo.    Beginning April 1, the  of Xarelto offers a mail order program that provides Xarelto for $85/mo (or $240 for 3 mo), regardless of income.  Information can be found at Izzui or by calling [phone].     Income-based resources for free/discounted medication:     --[facility] reena.  Patients that have primary care through [facility] may be eligible for [facility]'s income-based once yearly reena of $500 to pay for medications.  Information on this can be found at [email or web address]/[email or web address]-[email or web address]-[email or web address], or by calling [phone].       -- program.  Free Eliquis and Xarelto are available through the mail to income-eligible patients.  Application and info at crobo or 1-448.738.6085.    --Social Security subsidy.  This is a comprehensive program that reduces the price of all covered drugs to no more than $10.35.  Eligibility for this is based on both assets and income.  Apply at ssa.gov/extrahelp, by calling 1-954.829.6256, or at the Social Security office.     Delfina Cooley  Pharmacy Technician/Liaison, Discharge Pharmacy   [phone] (voice or text)  [email or web address]

## 2024-01-08 NOTE — LETTER
January 15, 2024       TO: Aileen Raygoza  [address]  [place] [address]       Dear Aileen Raygoza,      Her is a copy of the pricing breakdown of your xarelto and various patient assistance options. Please let us know if you have any questions.       Xarelto  --Patient will pay 100% of the first $545 in drugs costs (first month will be as much as $555)  --Subsequent fills will be $146/mo.     Beginning April 1, the  of Xarelto offers a mail order program that provides Xarelto for $85/mo (or $240 for 3 mo), regardless of income.  Information can be found at [employer] or by calling [phone].      Income-based resources for free/discounted medication:      --[facility] reena.  Patients that have primary care through [facility] may be eligible for [facility]'s income-based once yearly reena of $500 to pay for medications.  Information on this can be found at Avnera.org/billing/zwaoktpq-[email or web address]-[email or web address], or by calling [phone].        -- program.  Free Eliquis and Xarelto are available through the mail to income-eligible patients.  Application and info at vIPtela or 1-796.210.3536.     --Social Security subsidy.  This is a comprehensive program that reduces the price of all covered drugs to no more than $10.35.  Eligibility for this is based on both assets and income.  Apply at ssa.gov/extrahelp, by calling 1-426.941.1128, or at the Social Security office.         Thank you,  Team 2 Nurses   [facility]  [phone]

## 2024-01-08 NOTE — PROGRESS NOTES
SERVICE DATE: January 8, 2024    REFERRING PROVIDER:  Dr. Anastacio Devlin.    PRIMARY CARE AND REFERRING PROVIDER:  Katie Mcdaniel  [address] [place] [id number]    REASON FOR VISIT:  Paroxysmal atrial fibrillation, hypertension, hyperlipidemia.    HISTORY OF PRESENT ILLNESS:  Aileen Raygoza is a much younger appearing 88 year old female, lives alone, independent and self-caring.  She used to follow-up with my former partner Dr. Devlin and is here to establish care.    Her history is significant for paroxysmal atrial fibrillation with first degree AV block (auto rate controlled, rivaroxaban anticoagulation), well-controlled hypertension (on lisinopril monotherapy), hyperlipidemia (20 mg atorvastatin), BMI 28, never tobacco user, no alcohol use.    Patient remains asymptomatic.  Regular walker.  BP is 132/72 mmHg, pulse is 86 bpm.  Potassium 4.1, creatinine 0.45, LDL 48, HDL 61, normal CBC.    Primary issue today is that the rivaroxaban is very expensive with an out of pocket co-pay of over $1000.  She inquires about less expensive alternatives but is somewhat apprehensive about warfarin.  She reiterated multiple times today that she wants to review what ever is necessary to not have a stroke because her neighbor had a devastating stroke and was in a nursing home for a decade.    I reviewed the results of transthoracic echocardiogram dated 12/30/2022. Shows normal left ventricular systolic function, LVEF 65%, no regional wall motion abnormalities, normal right ventricular systolic function, atria were not well-visualized, no significant valve disease, normal inferior vena cava.    On exam - Regular heart sounds, no murmur, no carotid bruit.  Lungs are clear to auscultation.  No lower extremity edema.    DIAGNOSES/ASSESSMENT:  Nonvalvular paroxysmal atrial fibrillation.  Asymptomatic.  Auto rate controlled.  Anticoagulation counseling.  Wants to remain in on anticoagulation for stroke prevention, which I  "agree with.  [id number] score is 4. Xarelto to expensive.  Discussed warfarin.  Will need referral to INR clinic.    Benign.  Well-controlled.  Continue lisinopril 20 g daily.  Hyperlipidemia with goal LDL less than 70.  LDL at goal at 48.  Continue atorvastatin 40 mg daily.    PLAN:  INR clinic referral. Goal INR 2.0-3.0.  She wants to think about it. My nursing team will contact the patient.  If she chooses not to proceed with warfarin, remain on rivaroxaban 20 mg daily.   have ordered fasting labs (CBC, CMP, lipid panel), transthoracic echocardiogram, 3 day ziopatch and follow-up with me in 1 year.      Carly Wilson MD        Established patient.   Today's clinic visit entailed:  30 minutes spent by me on the date of the encounter doing chart review, history and exam, documentation and further activities per the note        Vitals: /72   Pulse 86   Ht 1.588 m (5' 2.5\")   Wt 72.3 kg (159 lb 6.4 oz)   LMP  (LMP Unknown)   SpO2 95%   BMI 28.69 kg/m    Wt Readings from Last 5 Encounters:   01/08/24 72.3 kg (159 lb 6.4 oz)   07/10/23 70.8 kg (156 lb)   06/23/23 73.8 kg (162 lb 12.8 oz)   06/22/23 73.8 kg (162 lb 12.8 oz)   06/21/23 73.8 kg (162 lb 12.8 oz)         No orders of the defined types were placed in this encounter.          CURRENT MEDICATIONS:  Current Outpatient Medications   Medication Sig Dispense Refill    acetaminophen (TYLENOL) 500 MG tablet Take 2 tablets (1,000 mg) by mouth 3 times daily (Patient taking differently: Take 1,000 mg by mouth as needed)      atorvastatin (LIPITOR) 20 MG tablet Take 1 tablet (20 mg) by mouth daily 100 tablet 6    Calcium Carbonate-Vitamin D (CALCIUM + D PO) Take 1 tablet by mouth daily      lisinopril (ZESTRIL) 20 MG tablet Take 1 tablet (20 mg) by mouth daily 100 tablet 5    Multiple Vitamins-Minerals (PRESERVISION AREDS 2 PO) Take 1 capsule by mouth 2 times daily      rivaroxaban ANTICOAGULANT (XARELTO) 20 MG TABS tablet Take 1 tablet (20 mg) by " mouth daily (with dinner) 90 tablet 3         ALLERGIES:  Allergies   Allergen Reactions    Simvastatin Muscle Pain (Myalgia)       PAST MEDICAL HISTORY:    Past Medical History:   Diagnosis Date    Benign essential hypertension     Impaired fasting glucose     PAF (paroxysmal atrial fibrillation) (H)     Pure hypercholesterolemia        PAST SURGICAL HISTORY:    Past Surgical History:   Procedure Laterality Date    ARTHROPLASTY KNEE Left 6/12/2023    Procedure: LEFT TOTAL KNEE ARTHROPLASTY;  Surgeon: Jitendra Blas MD;  Location:  OR    COLONOSCOPY N/A 02/05/2020    Procedure: COLONOSCOPY, WITH POLYPECTOMY AND BIOPSY;  Surgeon: Kelsea Cabezas MD;  Location:  GI    D & C      SAB    TONSILLECTOMY & ADENOIDECTOMY         FAMILY HISTORY:    Family History   Problem Relation Age of Onset    Colon Cancer Mother     Impaired Fasting Glucose Mother     Alzheimer Disease Brother     Coronary Artery Disease Brother         s/p PCI later in life    Coronary Artery Disease Early Onset No family hx of     Breast Cancer No family hx of     Ovarian Cancer No family hx of        SOCIAL HISTORY:    Social History     Socioeconomic History    Marital status:      Spouse name: None    Number of children: None    Years of education: None    Highest education level: None   Occupational History    Occupation: Retired - [employer]   Tobacco Use    Smoking status: Never    Smokeless tobacco: Never   Vaping Use    Vaping Use: Never used   Substance and Sexual Activity    Alcohol use: Not Currently     Comment: NO    Drug use: Never    Sexual activity: Not Currently   Social History Narrative    .    4 adult kids.    8 grandchildren.    Lives in a Co-op. Fully independent.    Leg and arm exercises.

## 2024-01-08 NOTE — TELEPHONE ENCOUNTER
Message from Dr. Wilson: patient is finding xarelto costly, see if warfarin is acceptable as an alternative. INR range should be 2-3 and order with INR team if she agrees.    Attempted to contact patient. Left a message to call back to discuss AC therapy alternatives.    Message sent to Rx coverage check

## 2024-01-08 NOTE — LETTER
1/8/2024    Katie Mcdaniel MD  [address] [facility] [address]    RE: Aileen Raygoza       Dear Colleague,     I had the pleasure of seeing Aileen Raygoza in the [facility].    SERVICE DATE: January 8, 2024    REFERRING PROVIDER:  Dr. Anastacio Devlin.    PRIMARY CARE AND REFERRING PROVIDER:  Katie Mcdaniel  [address] [facility] [address]    REASON FOR VISIT:  Paroxysmal atrial fibrillation, hypertension, hyperlipidemia.    HISTORY OF PRESENT ILLNESS:  Aileen Raygoza is a much younger appearing 88 year old female, lives alone, independent and self-caring.  She used to follow-up with my former partner Dr. Devlin and is here to establish care.    Her history is significant for paroxysmal atrial fibrillation with first degree AV block (auto rate controlled, rivaroxaban anticoagulation), well-controlled hypertension (on lisinopril monotherapy), hyperlipidemia (20 mg atorvastatin), BMI 28, never tobacco user, no alcohol use.    Patient remains asymptomatic.  Regular walker.  BP is 132/72 mmHg, pulse is 86 bpm.  Potassium 4.1, creatinine 0.45, LDL 48, HDL 61, normal CBC.    Primary issue today is that the rivaroxaban is very expensive with an out of pocket co-pay of over $1000.  She inquires about less expensive alternatives but is somewhat apprehensive about warfarin.  She reiterated multiple times today that she wants to review what ever is necessary to not have a stroke because her neighbor had a devastating stroke and was in a nursing home for a decade.    I reviewed the results of transthoracic echocardiogram dated 12/30/2022. Shows normal left ventricular systolic function, LVEF 65%, no regional wall motion abnormalities, normal right ventricular systolic function, atria were not well-visualized, no significant valve disease, normal inferior vena cava.    On exam - Regular heart sounds, no murmur, no carotid bruit.  Lungs are clear to auscultation.  No lower extremity  "edema.    DIAGNOSES/ASSESSMENT:  Nonvalvular paroxysmal atrial fibrillation.  Asymptomatic.  Auto rate controlled.  Anticoagulation counseling.  Wants to remain in on anticoagulation for stroke prevention, which I agree with.  [id number] score is 4. Xarelto to expensive.  Discussed warfarin.  Will need referral to INR clinic.    Benign.  Well-controlled.  Continue lisinopril 20 g daily.  Hyperlipidemia with goal LDL less than 70.  LDL at goal at 48.  Continue atorvastatin 40 mg daily.    PLAN:  INR clinic referral. Goal INR 2.0-3.0.  She wants to think about it. My nursing team will contact the patient.  If she chooses not to proceed with warfarin, remain on rivaroxaban 20 mg daily.   have ordered fasting labs (CBC, CMP, lipid panel), transthoracic echocardiogram, 3 day ziopatch and follow-up with me in 1 year.      Carly Wilson MD        Established patient.   Today's clinic visit entailed:  30 minutes spent by me on the date of the encounter doing chart review, history and exam, documentation and further activities per the note        Vitals: /72   Pulse 86   Ht 1.588 m (5' 2.5\")   Wt 72.3 kg (159 lb 6.4 oz)   LMP  (LMP Unknown)   SpO2 95%   BMI 28.69 kg/m    Wt Readings from Last 5 Encounters:   01/08/24 72.3 kg (159 lb 6.4 oz)   07/10/23 70.8 kg (156 lb)   06/23/23 73.8 kg (162 lb 12.8 oz)   06/22/23 73.8 kg (162 lb 12.8 oz)   06/21/23 73.8 kg (162 lb 12.8 oz)         No orders of the defined types were placed in this encounter.          CURRENT MEDICATIONS:  Current Outpatient Medications   Medication Sig Dispense Refill    acetaminophen (TYLENOL) 500 MG tablet Take 2 tablets (1,000 mg) by mouth 3 times daily (Patient taking differently: Take 1,000 mg by mouth as needed)      atorvastatin (LIPITOR) 20 MG tablet Take 1 tablet (20 mg) by mouth daily 100 tablet 6    Calcium Carbonate-Vitamin D (CALCIUM + D PO) Take 1 tablet by mouth daily      lisinopril (ZESTRIL) 20 MG tablet Take 1 tablet " (20 mg) by mouth daily 100 tablet 5    Multiple Vitamins-Minerals (PRESERVISION AREDS 2 PO) Take 1 capsule by mouth 2 times daily      rivaroxaban ANTICOAGULANT (XARELTO) 20 MG TABS tablet Take 1 tablet (20 mg) by mouth daily (with dinner) 90 tablet 3         ALLERGIES:  Allergies   Allergen Reactions    Simvastatin Muscle Pain (Myalgia)       PAST MEDICAL HISTORY:    Past Medical History:   Diagnosis Date    Benign essential hypertension     Impaired fasting glucose     PAF (paroxysmal atrial fibrillation) (H)     Pure hypercholesterolemia        PAST SURGICAL HISTORY:    Past Surgical History:   Procedure Laterality Date    ARTHROPLASTY KNEE Left 6/12/2023    Procedure: LEFT TOTAL KNEE ARTHROPLASTY;  Surgeon: Jitendra Blas MD;  Location:  OR    COLONOSCOPY N/A 02/05/2020    Procedure: COLONOSCOPY, WITH POLYPECTOMY AND BIOPSY;  Surgeon: Kelsea Cabezas MD;  Location:  GI    D & C      SAB    TONSILLECTOMY & ADENOIDECTOMY         FAMILY HISTORY:    Family History   Problem Relation Age of Onset    Colon Cancer Mother     Impaired Fasting Glucose Mother     Alzheimer Disease Brother     Coronary Artery Disease Brother         s/p PCI later in life    Coronary Artery Disease Early Onset No family hx of     Breast Cancer No family hx of     Ovarian Cancer No family hx of        SOCIAL HISTORY:    Social History     Socioeconomic History    Marital status:      Spouse name: None    Number of children: None    Years of education: None    Highest education level: None   Occupational History    Occupation: Retired - [employer]   Tobacco Use    Smoking status: Never    Smokeless tobacco: Never   Vaping Use    Vaping Use: Never used   Substance and Sexual Activity    Alcohol use: Not Currently     Comment: NO    Drug use: Never    Sexual activity: Not Currently   Social History Narrative    .    4 adult kids.    8 grandchildren.    Lives in a Co-op. Fully independent.    Leg and arm  exercises.        Thank you for allowing me to participate in the care of your patient.      Sincerely,     Carly Wilson MD     [facility]  cc:   No referring provider defined for this encounter.

## 2024-01-15 ENCOUNTER — TELEPHONE (OUTPATIENT)
Dept: CARDIOLOGY | Facility: CLINIC | Age: 89
End: 2024-01-15
Payer: MEDICARE

## 2024-01-15 DIAGNOSIS — I10 BENIGN ESSENTIAL HYPERTENSION: ICD-10-CM

## 2024-01-15 RX ORDER — LISINOPRIL 20 MG/1
20 TABLET ORAL DAILY
Qty: 100 TABLET | Refills: 5 | Status: SHIPPED | OUTPATIENT
Start: 2024-01-15 | End: 2024-01-23

## 2024-01-15 NOTE — TELEPHONE ENCOUNTER
I received a fax from [facility] stating they are no longer covered by the patient's insurance plan. They were not able to fill order for Lisinopril. I called Aileen and told her. She said she was not aware of that. She asked to have it sent to [facility] instead.

## 2024-01-15 NOTE — TELEPHONE ENCOUNTER
"Patient left a message asking for a callback to discuss xarelto. Returned patient's call, left a message to call back.       Addendum 1110: Spoke to patient says, she is having a very bad day. Her son in law is in the hospital and is not doing well. She was tearful on the call, says their family has \"had no luck lately.\"     She asked to review the information again for the pricing of xarelto. Reviewed info again from pharmacy team. She is upset that the medication costs so much, but also does not want to take warfarin. She will look into the various coverage assistance options and will call back with any questions. Letter sent with a copy of this info.   "

## 2024-01-23 ENCOUNTER — TELEPHONE (OUTPATIENT)
Dept: INTERNAL MEDICINE | Facility: CLINIC | Age: 89
End: 2024-01-23
Payer: MEDICARE

## 2024-01-23 DIAGNOSIS — E78.5 HYPERLIPIDEMIA LDL GOAL <70: ICD-10-CM

## 2024-01-23 DIAGNOSIS — I10 BENIGN ESSENTIAL HYPERTENSION: ICD-10-CM

## 2024-01-23 DIAGNOSIS — I48.0 PAF (PAROXYSMAL ATRIAL FIBRILLATION) (H): Primary | ICD-10-CM

## 2024-01-23 RX ORDER — ATORVASTATIN CALCIUM 20 MG/1
20 TABLET, FILM COATED ORAL DAILY
Qty: 90 TABLET | Refills: 3 | Status: SHIPPED | OUTPATIENT
Start: 2024-01-23 | End: 2024-04-26

## 2024-01-23 RX ORDER — LISINOPRIL 20 MG/1
20 TABLET ORAL DAILY
Qty: 90 TABLET | Refills: 3 | Status: SHIPPED | OUTPATIENT
Start: 2024-01-23 | End: 2024-04-22

## 2024-01-23 NOTE — TELEPHONE ENCOUNTER
Refills sent in.    Eliquis is the only alternative that I'm familiar with (there are others - Pradaxa and Lixiana - but I do not have any experience with these).    There is, of course, coumadin which is only pennies but requires frequent monitoring via INR clinic.

## 2024-01-23 NOTE — TELEPHONE ENCOUNTER
Patient called the clinic back and provider's message was relayed to her. Patient would like to try Eliquis to see how much that medication would cost. Patient stated that she only has 2 days left of the Xarelto right now and is worried about not having medication in time. Routing high priority to provider. Pharmacy is pended.    Saumya ANGEL RN  [facility] Triage Team

## 2024-01-23 NOTE — TELEPHONE ENCOUNTER
"Patient calling clinic stating she had attempted to have Xarelto script refilled and it was far more expensive than previous times she had it refilled. RN advised patient to call insurance company to receive pricing on Xarelto or potential alternatives that would be covered under plan.     Patient is also requesting refills for lisinopril and atorvastatin. Per chart review, medications had already been refilled recently by patient's cardiologist. Patient stated, however, she never received these refills and was told medications need to be resent. Patient stated she has 2 days remaining on all medications and PCP \"has always refilled these medications\".    Routing to PCP for review. Patient stated she called insurance plan and was advised to contact pharmacy for alternatives to blood thinner, then was told by pharmacy to check with insurance. Any recommendations for alternatives to Xarelto that would be more affordable? Thank you,  "

## 2024-01-24 ENCOUNTER — TELEPHONE (OUTPATIENT)
Dept: INTERNAL MEDICINE | Facility: CLINIC | Age: 89
End: 2024-01-24
Payer: MEDICARE

## 2024-01-24 DIAGNOSIS — Z79.01 ON CONTINUOUS ORAL ANTICOAGULATION: ICD-10-CM

## 2024-01-24 DIAGNOSIS — I10 BENIGN ESSENTIAL HYPERTENSION: ICD-10-CM

## 2024-01-24 DIAGNOSIS — I48.21 PERMANENT ATRIAL FIBRILLATION (H): ICD-10-CM

## 2024-01-24 NOTE — TELEPHONE ENCOUNTER
Patient requested this alternative because Xarelto cost too much. I did not communicate to patient that Eliquis would be less expensive, only that is was an alternative.

## 2024-01-24 NOTE — TELEPHONE ENCOUNTER
"TO  PCP  FYI    Patient calling stating, \"I was switched to eliquis and it is costing me $800 dollars! I can't afford that! Tell her that!\"    Writer did advise patient to call both insurance companies, medicare and [employer] cross and update PCP with alternatives at a cheaper cost.     Patient verbalized understanding.   "

## 2024-01-24 NOTE — TELEPHONE ENCOUNTER
Dr. Mcdaniel,    PT called back and to clarify would like to go back to Xarelto or at least try it, and see if it's now cheaper than Eliquis.    Pended a 30 day supply to review.    Spoke to pt in length about this.  Made plan with pt that she will check with insurance to see what they will cover and what pharmacy they will cover.    We also discussed worse case scenario, Warfarin.    It looks like cards also talked to her about all the options, and if needed possibly cardiology could take over if a med Dr. Mcdaniel is not familiar with.    Will await pt's call back for next step.    In meantime, pended most recent dose of Xarelto to [facility] for your review.    William Saleem RN

## 2024-01-25 DIAGNOSIS — Z53.9 ERRONEOUS ENCOUNTER--DISREGARD: Primary | ICD-10-CM

## 2024-01-25 NOTE — TELEPHONE ENCOUNTER
TO PCP    Pt asking for 3 month supply of xarelto to be sent to mail order pharmacy instead. Xarelto was already sent to [facility] pharmacy for 1 month supply on 1/24/24.    Pended for your review if appropriate.    CHRISTINA VACA RN on 1/25/2024 at 10:48 AM

## 2024-01-25 NOTE — TELEPHONE ENCOUNTER
Patient called back frustrated, stating she was not able to speak with insurance regarding preferred pharmacies. Patient states that she will not be taking Eliquis and only would like to take Xarelto.    Patient requested writer resend Xarelto prescription to [facility] in [place] on [address], writer resent Xarelto to requested pharmacy with original start date.    Kaylin Mauricio RN  -[facility]

## 2024-01-25 NOTE — TELEPHONE ENCOUNTER
Pharmacist from [facility] pharmacy in [place] calling to report that pharmacy is unable to fill 30-day Xarelto prescription, as it has been canceled (a one-year supply was sent to [facility] and canceled out local order).     Pharmacist states that pt has only one pill remaining and requires a small amount sent locally. Per chart review, Xarelto was sent to both [facility] and [facility] pharmacies, writer unsure of which pharmacy pt intends to have the prescription filled.    Writer called to patient and left vm requesting pt call back with preferred local pharmacy for Xarelto 30 day supply. After the local supply is sent, encounter can be routed back to triage and postponed for two days for sending year supply to [facility], until patient picks up local supply so that it won't get canceled again.    Writer called back to pt who stated that she will  the 30 day supply at [facility] tomorrow 01/26/24. Writer resent local prescription with initial start date.    Routing to triage to please resend year supply of Xarelto to [facility] on 01/29/24 with original start date of 1/25/24.     Kaylin Mauricio RN  -[facility]

## 2024-04-19 DIAGNOSIS — I10 BENIGN ESSENTIAL HYPERTENSION: ICD-10-CM

## 2024-04-19 NOTE — TELEPHONE ENCOUNTER
Medication Question or Refill        What medication are you calling about (include dose and sig)?: atorvastatin (LIPITOR) 20 MG tablet , lisinopril (ZESTRIL) 20 MG tablet and (XARELTO) 20 MG TABS     Preferred Pharmacy:     [facility] - [address] [address]  Phone: [phone] Fax: [phone]      Controlled Substance Agreement on file:   CSA -- Patient Level:    CSA: None found at the patient level.       Who prescribed the medication?: PCP    Do you need a refill? Yes    When did you use the medication last? Daily    Patient offered an appointment? No    Do you have any questions or concerns?  No      Okay to leave a detailed message?: Yes at Cell number on file:    Telephone Information:   Mobile [phone]

## 2024-04-22 RX ORDER — LISINOPRIL 20 MG/1
20 TABLET ORAL DAILY
Qty: 90 TABLET | Refills: 3 | Status: SHIPPED | OUTPATIENT
Start: 2024-04-22

## 2024-04-26 DIAGNOSIS — E78.5 HYPERLIPIDEMIA LDL GOAL <70: ICD-10-CM

## 2024-04-26 RX ORDER — ATORVASTATIN CALCIUM 20 MG/1
20 TABLET, FILM COATED ORAL DAILY
Qty: 90 TABLET | Refills: 3 | Status: SHIPPED | OUTPATIENT
Start: 2024-04-26

## 2024-04-29 NOTE — TELEPHONE ENCOUNTER
Pt reporting that she is upset she has not received her atorvastatin rx yet. # given for [facility]. Pt will call pharmacy to discuss and contact clinic if anything else is needed. Pt reporting she is out of medication. Pt spoke to a local pharmacy about a temporary rx refill, but she does not want to pay $14.00 out of pocket at this time.

## 2024-05-13 ENCOUNTER — APPOINTMENT (OUTPATIENT)
Dept: URBAN - METROPOLITAN AREA CLINIC 256 | Age: 89
Setting detail: DERMATOLOGY
End: 2024-05-13

## 2024-05-13 VITALS — WEIGHT: 125 LBS

## 2024-05-13 DIAGNOSIS — D18.0 HEMANGIOMA: ICD-10-CM

## 2024-05-13 DIAGNOSIS — Z71.89 OTHER SPECIFIED COUNSELING: ICD-10-CM

## 2024-05-13 DIAGNOSIS — D22 MELANOCYTIC NEVI: ICD-10-CM

## 2024-05-13 DIAGNOSIS — L57.8 OTHER SKIN CHANGES DUE TO CHRONIC EXPOSURE TO NONIONIZING RADIATION: ICD-10-CM

## 2024-05-13 DIAGNOSIS — L82.1 OTHER SEBORRHEIC KERATOSIS: ICD-10-CM

## 2024-05-13 PROBLEM — D22.71 MELANOCYTIC NEVI OF RIGHT LOWER LIMB, INCLUDING HIP: Status: ACTIVE | Noted: 2024-05-13

## 2024-05-13 PROBLEM — D18.01 HEMANGIOMA OF SKIN AND SUBCUTANEOUS TISSUE: Status: ACTIVE | Noted: 2024-05-13

## 2024-05-13 PROBLEM — D22.62 MELANOCYTIC NEVI OF LEFT UPPER LIMB, INCLUDING SHOULDER: Status: ACTIVE | Noted: 2024-05-13

## 2024-05-13 PROBLEM — D22.5 MELANOCYTIC NEVI OF TRUNK: Status: ACTIVE | Noted: 2024-05-13

## 2024-05-13 PROBLEM — D22.61 MELANOCYTIC NEVI OF RIGHT UPPER LIMB, INCLUDING SHOULDER: Status: ACTIVE | Noted: 2024-05-13

## 2024-05-13 PROBLEM — D22.72 MELANOCYTIC NEVI OF LEFT LOWER LIMB, INCLUDING HIP: Status: ACTIVE | Noted: 2024-05-13

## 2024-05-13 PROCEDURE — 99203 OFFICE O/P NEW LOW 30 MIN: CPT

## 2024-05-13 PROCEDURE — OTHER SUNSCREEN RECOMMENDATIONS: OTHER

## 2024-05-13 PROCEDURE — OTHER MIPS QUALITY: OTHER

## 2024-05-13 PROCEDURE — OTHER COUNSELING: OTHER

## 2024-05-13 ASSESSMENT — LOCATION DETAILED DESCRIPTION DERM
LOCATION DETAILED: LEFT ANTECUBITAL SKIN
LOCATION DETAILED: INFERIOR THORACIC SPINE
LOCATION DETAILED: LEFT ANTERIOR PROXIMAL THIGH
LOCATION DETAILED: MIDDLE STERNUM
LOCATION DETAILED: RIGHT VENTRAL DISTAL FOREARM
LOCATION DETAILED: LEFT MEDIAL UPPER BACK
LOCATION DETAILED: LEFT VENTRAL PROXIMAL FOREARM
LOCATION DETAILED: LEFT ANTERIOR DISTAL THIGH
LOCATION DETAILED: EPIGASTRIC SKIN
LOCATION DETAILED: LEFT INFERIOR CENTRAL MALAR CHEEK
LOCATION DETAILED: LEFT SUPERIOR UPPER BACK
LOCATION DETAILED: RIGHT PROXIMAL DORSAL FOREARM
LOCATION DETAILED: RIGHT VENTRAL PROXIMAL FOREARM
LOCATION DETAILED: RIGHT ANTERIOR PROXIMAL THIGH
LOCATION DETAILED: RIGHT ANTERIOR DISTAL THIGH
LOCATION DETAILED: LEFT DISTAL DORSAL FOREARM
LOCATION DETAILED: LEFT INFERIOR LATERAL MIDBACK
LOCATION DETAILED: LEFT VENTRAL DISTAL FOREARM

## 2024-05-13 ASSESSMENT — LOCATION SIMPLE DESCRIPTION DERM
LOCATION SIMPLE: LEFT CHEEK
LOCATION SIMPLE: RIGHT FOREARM
LOCATION SIMPLE: CHEST
LOCATION SIMPLE: LEFT UPPER ARM
LOCATION SIMPLE: UPPER BACK
LOCATION SIMPLE: LEFT UPPER BACK
LOCATION SIMPLE: RIGHT THIGH
LOCATION SIMPLE: LEFT THIGH
LOCATION SIMPLE: ABDOMEN
LOCATION SIMPLE: LEFT FOREARM
LOCATION SIMPLE: LEFT LOWER BACK

## 2024-05-13 ASSESSMENT — LOCATION ZONE DERM
LOCATION ZONE: TRUNK
LOCATION ZONE: LEG
LOCATION ZONE: FACE
LOCATION ZONE: ARM

## 2024-05-13 NOTE — PROCEDURE: COUNSELING
Detail Level: Zone
Detail Level: Detailed
Purse String (Intermediate) Text: Given the location of the defect and the characteristics of the surrounding skin a purse string intermediate closure was deemed most appropriate.  Undermining was performed circumferentially around the surgical defect.  A purse string suture was then placed and tightened.

## 2024-10-28 ENCOUNTER — TRANSFERRED RECORDS (OUTPATIENT)
Dept: HEALTH INFORMATION MANAGEMENT | Facility: CLINIC | Age: 89
End: 2024-10-28
Payer: MEDICARE

## 2025-01-24 PROBLEM — Z96.652 PRESENCE OF LEFT ARTIFICIAL KNEE JOINT: Status: ACTIVE | Noted: 2023-06-15

## 2025-01-24 PROBLEM — I48.21 PERMANENT ATRIAL FIBRILLATION (H): Status: ACTIVE | Noted: 2025-01-24

## 2025-01-31 ENCOUNTER — TELEPHONE (OUTPATIENT)
Dept: INTERNAL MEDICINE | Facility: CLINIC | Age: OVER 89
End: 2025-01-31
Payer: MEDICARE

## 2025-02-04 NOTE — TELEPHONE ENCOUNTER
Patient Contact    Attempt # 2    Was call answered?  No.  Left message on voicemail with information to call me back.    
 Mayelin Rogers RN  1/31/2025  2:28 PM CST Back to Top      Patient Contact     Attempt # 1     Was call answered?  No.  Left message on voicemail with information to call me back.    Emily Bennett MD  1/31/2025  2:21 PM CST       Please call patient.     Labs show an increase in blood glucose level.  Rest of the labs look good.     I recommend that we recheck her blood glucose level fasting next month to make sure it is not any higher.  I placed the orders.     
Patient notified. Lab appointment scheduled 3-5-25.    Mine Castro RN    
Less than or equal to 5 Contractions in 30 minutes

## 2025-02-20 ENCOUNTER — OFFICE VISIT (OUTPATIENT)
Dept: INTERNAL MEDICINE | Facility: CLINIC | Age: OVER 89
End: 2025-02-20
Payer: MEDICARE

## 2025-02-20 VITALS
DIASTOLIC BLOOD PRESSURE: 81 MMHG | OXYGEN SATURATION: 97 % | HEART RATE: 89 BPM | BODY MASS INDEX: 28.52 KG/M2 | RESPIRATION RATE: 16 BRPM | HEIGHT: 62 IN | SYSTOLIC BLOOD PRESSURE: 145 MMHG | WEIGHT: 155 LBS | TEMPERATURE: 98 F

## 2025-02-20 DIAGNOSIS — R73.9 HYPERGLYCEMIA: ICD-10-CM

## 2025-02-20 DIAGNOSIS — Z01.818 PREOP GENERAL PHYSICAL EXAM: Primary | ICD-10-CM

## 2025-02-20 DIAGNOSIS — E78.00 PURE HYPERCHOLESTEROLEMIA: ICD-10-CM

## 2025-02-20 DIAGNOSIS — I48.21 PERMANENT ATRIAL FIBRILLATION (H): ICD-10-CM

## 2025-02-20 DIAGNOSIS — I10 BENIGN ESSENTIAL HYPERTENSION: ICD-10-CM

## 2025-02-20 LAB
EST. AVERAGE GLUCOSE BLD GHB EST-MCNC: 123 MG/DL
FASTING STATUS PATIENT QL REPORTED: YES
GLUCOSE SERPL-MCNC: 98 MG/DL (ref 70–99)
HBA1C MFR BLD: 5.9 % (ref 0–5.6)

## 2025-02-20 NOTE — PATIENT INSTRUCTIONS
How to Take Your Medication Before Surgery  Preoperative Medication Instructions   Antiplatelet or Anticoagulation Medication Instructions   - apixaban (Eliquis), edoxaban (Savaysa), rivaroxaban (Xarelto): Bleeding risk is moderate or high for this procedure AND CrCl  (>=) 50 mL/min. DO NOT TAKE 2 days before surgery.     Last dose on Saturday before procedure    Additional Medication Instructions   - Herbal medications and vitamins: DO NOT TAKE 14 days prior to surgery.   - ACE/ARB/ARNI (lisinopril, enalapril, losartan, valsartan, olmesartan, sacubritril/valsartan) : DO NOT TAKE  Lisinopril on day of surgery- Do not take Tuesday Morning.   (minimum 11 hours for general anesthesia).     - Statins (atorvastatin, simvastatin, pravastatin) : Continue taking on the day of surgery.    - ibuprofen (Advil, Motrin): DO NOT TAKE 1 day before surgery.    - naproxen (Aleve, Naprosyn): DO NOT TAKE 4 days before surgery.        Patient Education   Preparing for Your Surgery  For Adults  Getting started  In most cases, a nurse will call to review your health history and instructions. They will give you an arrival time based on your scheduled surgery time. Please be ready to share:  Your doctor's clinic name and phone number  Your medical, surgical, and anesthesia history  A list of allergies and sensitivities  A list of medicines, including herbal treatments and over-the-counter drugs  Whether the patient has a legal guardian (ask how to send us the papers in advance)  Note: You may not receive a call if you were seen at our PAC (Preoperative Assessment Center).  Please tell us if you're pregnant--or if there's any chance you might be pregnant. Some surgeries may injure a fetus (unborn baby), so they require a pregnancy test. Surgeries that are safe for a fetus don't always need a test, and you can choose whether to have one.   Preparing for surgery  Within 10 to 30 days of surgery: Have a pre-op exam (sometimes called an H&P, or  History and Physical). This can be done at a clinic or pre-operative center.  If you're having a , you may not need this exam. Talk to your care team.  At your pre-op exam, talk to your care team about all medicines you take. (This includes CBD oil and any drugs, such as THC, marijuana, and other forms of cannabis.) If you need to stop any medicine before surgery, ask when to start taking it again.  This is for your safety. Many medicines and drugs can make you bleed too much during surgery. Some change how well surgery (anesthesia) drugs work.  Call your insurance company to let them know you're having surgery. (If you don't have insurance, call [phone].)  Call your clinic if there's any change in your health. This includes a scrape or scratch near the surgery site, or any signs of a cold (sore throat, runny nose, cough, rash, fever).  Eating and drinking guidelines  For your safety: Unless your surgeon tells you otherwise, follow the guidelines below.  Eat and drink as normal until 8 hours before you arrive for surgery. After that, no food or milk. You can spit out gum when you arrive.  Drink clear liquids until 2 hours before you arrive. These are liquids you can see through, like water, Gatorade, and Propel Water. They also include plain black coffee and tea (no cream or milk).  No alcohol for 24 hours before you arrive. The night before surgery, stop any drinks that contain THC.  If your care team tells you to take medicine on the morning of surgery, it's okay to take it with a sip of water. No other medicines or drugs are allowed (including CBD oil)--follow your care team's instructions.  If you have questions the day of surgery, call your hospital or surgery center.   Preventing infection  Shower or bathe the night before and the morning of surgery. Follow the instructions your clinic gave you. (If no instructions, use regular soap.)  Don't shave or clip hair near your surgery site. We'll remove  the hair if needed.  Don't smoke or vape the morning of surgery. No chewing tobacco for 6 hours before you arrive. A nicotine patch is okay. You may spit out nicotine gum when you arrive.  For some surgeries, the surgeon will tell you to fully quit smoking and nicotine.  We will make every effort to keep you safe from infection. We will:  Clean our hands often with soap and water (or an alcohol-based hand rub).  Clean the skin at your surgery site with a special soap that kills germs.  Give you a special gown to keep you warm. (Cold raises the risk of infection.)  Wear hair covers, masks, gowns, and gloves during surgery.  Give antibiotic medicine, if prescribed. Not all surgeries need this medicine.  What to bring on the day of surgery  Photo ID and insurance card  Copy of your health care directive, if you have one  Glasses and hearing aids (bring cases)  You can't wear contacts during surgery  Inhaler and eye drops, if you use them (tell us about these when you arrive)  CPAP machine or breathing device, if you use them  A few personal items, if spending the night  If you have . . .  A pacemaker, ICD (cardiac defibrillator), or other implant: Bring the ID card.  An implanted stimulator: Bring the remote control.  A legal guardian: Bring a copy of the certified (court-stamped) guardianship papers.  Please remove any jewelry, including body piercings. Leave jewelry and other valuables at home.  If you're going home the day of surgery  You must have a responsible adult drive you home. They should stay with you overnight as well.  If you don't have someone to stay with you, and you aren't safe to go home alone, we may keep you overnight. Insurance often won't pay for this.  After surgery  If it's hard to control your pain or you need more pain medicine, please call your surgeon's office.  Questions?   If you have any questions for your care team, list them here:    ____________________________________________________________________________________________________________________________________________________________________________________________________________________________________________________________  For informational purposes only. Not to replace the advice of your health care provider. Copyright   2003, 2019 [facility]. All rights reserved. Clinically reviewed by Anthony Monroe MD. [employer] 714700 - REV 08/24.

## 2025-02-20 NOTE — PROGRESS NOTES
Preoperative Evaluation  [address] [address]  Phone: [phone]  Primary Provider: Emily Bennett MD  Pre-op Performing Provider: Emily Bennett MD  Feb 20, 2025 2/20/2025   Surgical Information   What procedure is being done? Colonoscopy    Facility or Hospital where procedure/surgery will be performed: [facility]    Who is doing the procedure / surgery? Dr. Diane Londono    Date of surgery / procedure: 02/25/2025    Time of surgery / procedure: 11:00AM    Where do you plan to recover after surgery? at home alone        Proxy-reported     Fax number for surgical facility: [phone]    Assessment & Plan     The proposed surgical procedure is considered LOW risk.    Preop general physical exam      Permanent atrial fibrillation (H)      Benign essential hypertension      Pure hypercholesterolemia      Hyperglycemia  A1C ordered        Follow up in 3 months.    The longitudinal plan of care for the diagnosis(es)/condition(s) as documented were addressed during this visit. Due to the added complexity in care, I will continue to support Aileen in the subsequent management and with ongoing continuity of care.          - No identified additional risk factors other than previously addressed    Preoperative Medication Instructions  Antiplatelet or Anticoagulation Medication Instructions   - apixaban (Eliquis), edoxaban (Savaysa), rivaroxaban (Xarelto): Bleeding risk is moderate or high for this procedure AND CrCl  (>=) 50 mL/min. DO NOT TAKE 2 days before surgery.     Last dose on Saturday before procedure    Additional Medication Instructions   - Herbal medications and vitamins: DO NOT TAKE 14 days prior to surgery.   - ACE/ARB/ARNI (lisinopril, enalapril, losartan, valsartan, olmesartan, sacubritril/valsartan) : DO NOT TAKE  Lisinopril on day of surgery- Do not take Tuesday Morning.   (minimum 11 hours for  general anesthesia).     - Statins (atorvastatin, simvastatin, pravastatin) : Continue taking on the day of surgery.    - ibuprofen (Advil, Motrin): DO NOT TAKE 1 day before surgery.    - naproxen (Aleve, Naprosyn): DO NOT TAKE 4 days before surgery.     Recommendation  Approval given to proceed with proposed procedure, without further diagnostic evaluation.    Dot Gallagher is a 89 year old, presenting for the following:  Pre-Op Exam        HPI related to upcoming procedure:     Last labs showed higher glucose.    Denies any symptoms today.            2/20/2025   Pre-Op Questionnaire   Have you ever had a heart attack or stroke? No    Have you ever had surgery on your heart or blood vessels, such as a stent placement, a coronary artery bypass, or surgery on an artery in your head, neck, heart, or legs? No    Do you have chest pain with activity? No    Do you have a history of heart failure? No    Do you currently have a cold, bronchitis or symptoms of other infection? No    Do you have a cough, shortness of breath, or wheezing? No    Do you or anyone in your family have previous history of blood clots? No    Do you or does anyone in your family have a serious bleeding problem such as prolonged bleeding following surgeries or cuts? No    Have you ever had problems with anemia or been told to take iron pills? No    Have you had any abnormal blood loss such as black, tarry or bloody stools, or abnormal vaginal bleeding? No    Have you ever had a blood transfusion? No    Are you willing to have a blood transfusion if it is medically needed before, during, or after your surgery? Yes    Have you or any of your relatives ever had problems with anesthesia? No    Do you have sleep apnea, excessive snoring or daytime drowsiness? No    Do you have any artifical heart valves or other implanted medical devices like a pacemaker, defibrillator, or continuous glucose monitor? No    Do you have artificial joints? No    Are  you allergic to latex? No        Proxy-reported     Health Care Directive  Patient does not have a Health Care Directive:     Preoperative Review of             Patient Active Problem List    Diagnosis Date Noted    Permanent atrial fibrillation (H) 01/24/2025     Priority: Medium    Presence of left artificial knee joint 06/15/2023     Priority: Medium    S/P total knee arthroplasty, left 06/12/2023     Priority: Medium    Impaired fasting glucose 02/14/2023     Priority: Medium    PAF (paroxysmal atrial fibrillation) (H)      Priority: Medium    Pure hypercholesterolemia      Priority: Medium    Benign essential hypertension      Priority: Medium      Past Medical History:   Diagnosis Date    Benign essential hypertension     Impaired fasting glucose     PAF (paroxysmal atrial fibrillation) (H)     Pure hypercholesterolemia      Past Surgical History:   Procedure Laterality Date    ARTHROPLASTY KNEE Left 6/12/2023    Procedure: LEFT TOTAL KNEE ARTHROPLASTY;  Surgeon: Jitendra Blas MD;  Location:  OR    COLONOSCOPY N/A 02/05/2020    Procedure: COLONOSCOPY, WITH POLYPECTOMY AND BIOPSY;  Surgeon: Kelsea Cabezas MD;  Location:  GI    D [facility] C      [facility]    TONSILLECTOMY & ADENOIDECTOMY       Current Outpatient Medications   Medication Sig Dispense Refill    acetaminophen (TYLENOL) 500 MG tablet Take 2 tablets (1,000 mg) by mouth 3 times daily      atorvastatin (LIPITOR) 20 MG tablet Take 1 tablet (20 mg) by mouth daily. 90 tablet 3    Calcium Carbonate-Vitamin D (CALCIUM + D PO) Take 1 tablet by mouth daily      lisinopril (ZESTRIL) 20 MG tablet Take 1 tablet (20 mg) by mouth daily. 90 tablet 3    Multiple Vitamins-Minerals (PRESERVISION AREDS 2 PO) Take 1 capsule by mouth 2 times daily      rivaroxaban ANTICOAGULANT (XARELTO) 20 MG TABS tablet Take 1 tablet (20 mg) by mouth daily (with dinner). 60 tablet 0       Allergies   Allergen Reactions    Simvastatin Muscle Pain (Myalgia)        Social History  "    Tobacco Use    Smoking status: Never    Smokeless tobacco: Never   Substance Use Topics    Alcohol use: Not Currently     Comment: NO       History   Drug Use Unknown             Review of Systems  Constitutional, neuro, ENT, endocrine, pulmonary, cardiac, gastrointestinal, genitourinary, musculoskeletal, integument and psychiatric systems are negative, except as otherwise noted.    Objective    BP (!) 145/81   Pulse 89   Temp 98  F (36.7  C) (Temporal)   Resp 16   Ht 1.575 m (5' 2\")   Wt 70.3 kg (155 lb)   LMP  (LMP Unknown)   SpO2 97%   BMI 28.35 kg/m     Estimated body mass index is 28.35 kg/m  as calculated from the following:    Height as of this encounter: 1.575 m (5' 2\").    Weight as of this encounter: 70.3 kg (155 lb).  Physical Exam  GENERAL: alert and no distress  NECK: no adenopathy, no asymmetry, masses, or scars  RESP: lungs clear to auscultation - no rales, rhonchi or wheezes  CV: regular rate and rhythm, normal S1 S2, no S3 or S4, no murmur, click or rub, no peripheral edema  ABDOMEN: soft, nontender, no hepatosplenomegaly, no masses and bowel sounds normal  MS: no gross musculoskeletal defects noted, no edema    Recent Labs   Lab Test 01/24/25  1426   HGB 13.3         POTASSIUM 4.0   CR 0.82        Diagnostics  No labs were ordered during this visit.   No EKG required for low risk surgery (cataract, skin procedure, breast biopsy, etc).    Revised Cardiac Risk Index (RCRI)  The patient has the following serious cardiovascular risks for perioperative complications:   - No serious cardiac risks = 0 points     RCRI Interpretation: 0 points: Class I (very low risk - 0.4% complication rate)         Signed Electronically by: Emily Bennett MD  A copy of this evaluation report is provided to the requesting physician.         "

## 2025-02-25 ENCOUNTER — TELEPHONE (OUTPATIENT)
Dept: NURSING | Facility: CLINIC | Age: OVER 89
End: 2025-02-25
Payer: MEDICARE

## 2025-02-25 NOTE — TELEPHONE ENCOUNTER
Patient is supposed to be taking mag citrate at 0600. Patient has questions regarding her mag citrate. Patient received a 10 oz bottle and was unsure if that was one dose or not. Writer reviewed medication instructions of that type of medication and noted that it is recommended not take more than 10 0z in a 24 hour period and that 10 oz can be considered 1 dose. Writer advised patient to drink an additional 8 oz of water with the medication as well. Writer also advised patient that the medication may cause loose stools. Patient has no other questions or concerns at time of call.

## 2025-04-07 ENCOUNTER — TELEPHONE (OUTPATIENT)
Dept: CARDIOLOGY | Facility: CLINIC | Age: OVER 89
End: 2025-04-07
Payer: MEDICARE

## 2025-04-07 DIAGNOSIS — I10 BENIGN ESSENTIAL HYPERTENSION: ICD-10-CM

## 2025-04-07 DIAGNOSIS — I48.0 PAF (PAROXYSMAL ATRIAL FIBRILLATION) (H): Primary | ICD-10-CM

## 2025-04-07 NOTE — TELEPHONE ENCOUNTER
Patient returned call to team 2 nurse line. Reports she does not want to cancel her appointment as she already has transportation arranged. Also would like to discuss what testing is necessary. Appointment note updated and no orders for echocardiogram or zio patch entered.

## 2025-04-07 NOTE — TELEPHONE ENCOUNTER
----- Message from Whitney SUTTON sent at 4/7/2025 12:03 PM CDT -----  Regarding: Zio and Echo orders  Hello,    LOV 1/8/24 w/ Dr Wilson:  PLAN:  1. INR clinic referral. Goal INR 2.0-3.0.  She wants to think about it. My nursing team will contact the patient.  2. If she chooses not to proceed with warfarin, remain on rivaroxaban 20 mg daily.  3.  have ordered fasting labs (CBC, CMP, lipid panel), transthoracic echocardiogram, 3 day ziopatch and follow-up with me in 1 year.    - No orders for echo and Zio monitor.  (PCP did labs 1/24/25)    - Should she have these done before seeing Dr Wilson or keep appt ?    Thanks,  SYDNEE Olson    =============================================    Patient called to inform of need for testing prior to follow up appointment with Dr. Wilson scheduled 4/10/25 and this appointment will likely need to be moved if patient is stable. No answer, detailed voicemail left asking patient to please return call to team 2 nurse line.    Echocardiogram ordered, please order zio patch when confirm with patient if she would like mail out or clinic application.     Tanya Roberts, RN  [facility]  [phone]

## 2025-04-08 NOTE — TELEPHONE ENCOUNTER
Patient left a voicemail calling to to confirm her visit on 4/10. Returned patient's call and confirmed appointment date/time/location.

## 2025-04-10 ENCOUNTER — OFFICE VISIT (OUTPATIENT)
Dept: CARDIOLOGY | Facility: CLINIC | Age: OVER 89
End: 2025-04-10
Payer: MEDICARE

## 2025-04-10 VITALS
BODY MASS INDEX: 28.58 KG/M2 | OXYGEN SATURATION: 95 % | HEART RATE: 93 BPM | WEIGHT: 155.3 LBS | SYSTOLIC BLOOD PRESSURE: 147 MMHG | DIASTOLIC BLOOD PRESSURE: 79 MMHG | HEIGHT: 62 IN

## 2025-04-10 DIAGNOSIS — I10 BENIGN ESSENTIAL HYPERTENSION: ICD-10-CM

## 2025-04-10 DIAGNOSIS — I48.21 PERMANENT ATRIAL FIBRILLATION (H): Primary | ICD-10-CM

## 2025-04-10 DIAGNOSIS — E78.5 HYPERLIPIDEMIA LDL GOAL <100: ICD-10-CM

## 2025-04-10 RX ORDER — METOPROLOL TARTRATE 25 MG/1
25 TABLET, FILM COATED ORAL 2 TIMES DAILY
Qty: 60 TABLET | Refills: 4 | Status: SHIPPED | OUTPATIENT
Start: 2025-04-10

## 2025-04-10 NOTE — PROGRESS NOTES
SERVICE DATE: April 10, 2025      DIAGNOSES/ASSESSMENT:    Permanent atrial fibrillation, suboptimally rate controlled, on rivaroxaban anticoagulation.  Patient is insistent that she is doing well and does not need extra medications or additional testing (she declined heart monitor and echocardiogram).  Explained to her that her resting heart rates are varying between 90 to 140 bpm (per her report) and if uncontrolled will put her at risk of congestive heart failure and valvular regurgitation.  After detailed discussion, she agrees to go on a beta-blocker.  Systemic hypertension, not optimally controlled.  Will give us an opportunity to introduce beta-blocker.  She is already on lisinopril 20 mg daily.  Hyperlipidemia with at target LDL of 52.  Continue atorvastatin 20 mg daily.    PLAN:    Detailed discussion with patient explaining the importance of rate controlling atrial fibrillation to prevent congestive heart failure and valvular regurgitation.  Start metoprolol tartrate 25 mg 2 times daily to manage atrial fibrillation and hypertension.  Patient education on potential side effects of metoprolol, including fatigue.  Visit summary provided.  Continue rivaroxaban long-term for anticoagulation.  In 3 months, follow-up with physician assistant to assess heart rate control.  Revisit need for transthoracic echocardiogram.  Last echo in 2022 was normal.  Follow-up with me every 3 years or as needed.      Carly Wilson MD  Cardiology      REASON FOR VISIT:  Follow-up of permanent atrial fibrillation, hypertension, hyperlipidemia.    HISTORY OF PRESENT ILLNESS:  Aileen Raygoza is a 89 year old female, who lives independently and highly functional presenting for a follow-up of permanent atrial fibrillation, hypertension, and hyperlipidemia.     She has a history of permanent atrial fibrillation with ventricular rates previously auto rate controlled. Over the last year, her ventricular rates have increased  "to  BPM, and she is not on any rate-controlling medications. The patient is currently on rivaroxaban for anticoagulation. Her blood pressure is 147/80, and she is on lisinopril 20 mg daily for hypertension. Her LDL is well controlled at 52 with atorvastatin 20 mg daily.     Last transthoracic echocardiogram in 2022 showed normal cardiac function with an LVF of 65%. The patient's heart rates have been noted to vary between 90 to 140 BPM.  But she remains asymptomatic. She is independent, lives on her own, and is self-caring. She walks three miles a day.    Social History: The patient is a never tobacco user and does not consume alcohol.     On exam, vitals as documented.  She is comfortable at rest.  No edema.  Heart rates are irregular at 100-110 bpm, no audible murmur, lungs are clear, normal JVP.    Established patient.   Total time today 40 minutes.    The longitudinal plan of care for the condition(s) below were addressed during this visit. Due to the added complexity in care, I will continue to support Aileen in the subsequent management of this condition(s) and with the ongoing continuity of care of this condition(s).  Atrial fibrillation with suboptimal rate control, uncontrolled hypertension.    This note was completed in part using dictation via the Dragon voice recognition software. Some word and grammatical errors may occur and must be interpreted in the appropriate clinical context. If there are any questions pertaining to this issue, please contact me for further clarification.     Orders Placed This Encounter   Procedures    Follow-Up with Cardiology [facility]         Vitals: BP (!) 147/79 (BP Location: Left arm, Patient Position: Sitting)   Pulse 93   Ht 1.575 m (5' 2\")   Wt 70.4 kg (155 lb 4.8 oz)   LMP  (LMP Unknown)   SpO2 95%   BMI 28.40 kg/m        CURRENT MEDICATIONS:  Current Outpatient Medications   Medication Sig Dispense Refill    acetaminophen (TYLENOL) 500 MG tablet Take 2 tablets " (1,000 mg) by mouth 3 times daily (Patient taking differently: Take 1,000 mg by mouth 3 times daily. PRN)      atorvastatin (LIPITOR) 20 MG tablet Take 1 tablet (20 mg) by mouth daily. 90 tablet 3    Calcium Carbonate-Vitamin D (CALCIUM + D PO) Take 1 tablet by mouth daily      lisinopril (ZESTRIL) 20 MG tablet Take 1 tablet (20 mg) by mouth daily. 90 tablet 3    metoprolol tartrate (LOPRESSOR) 25 MG tablet Take 1 tablet (25 mg) by mouth 2 times daily. 60 tablet 4    Multiple Vitamins-Minerals (PRESERVISION AREDS 2 PO) Take 1 capsule by mouth 2 times daily      rivaroxaban ANTICOAGULANT (XARELTO) 20 MG TABS tablet Take 1 tablet (20 mg) by mouth daily (with dinner). 60 tablet 0         ALLERGIES:  Allergies   Allergen Reactions    Simvastatin Muscle Pain (Myalgia)

## 2025-04-10 NOTE — LETTER
4/10/2025    Emily Bennett MD  [address] [facility] [address]    RE: Aileen Raygoza       Dear Colleague,     I had the pleasure of seeing Aileen Raygoza in the [facility].      SERVICE DATE: April 10, 2025      DIAGNOSES/ASSESSMENT:    Permanent atrial fibrillation, suboptimally rate controlled, on rivaroxaban anticoagulation.  Patient is insistent that she is doing well and does not need extra medications or additional testing (she declined heart monitor and echocardiogram).  Explained to her that her resting heart rates are varying between 90 to 140 bpm (per her report) and if uncontrolled will put her at risk of congestive heart failure and valvular regurgitation.  After detailed discussion, she agrees to go on a beta-blocker.  Systemic hypertension, not optimally controlled.  Will give us an opportunity to introduce beta-blocker.  She is already on lisinopril 20 mg daily.  Hyperlipidemia with at target LDL of 52.  Continue atorvastatin 20 mg daily.    PLAN:    Detailed discussion with patient explaining the importance of rate controlling atrial fibrillation to prevent congestive heart failure and valvular regurgitation.  Start metoprolol tartrate 25 mg 2 times daily to manage atrial fibrillation and hypertension.  Patient education on potential side effects of metoprolol, including fatigue.  Visit summary provided.  Continue rivaroxaban long-term for anticoagulation.  In 3 months, follow-up with physician assistant to assess heart rate control.  Revisit need for transthoracic echocardiogram.  Last echo in 2022 was normal.  Follow-up with me every 3 years or as needed.      Carly Wilson MD  Cardiology      REASON FOR VISIT:  Follow-up of permanent atrial fibrillation, hypertension, hyperlipidemia.    HISTORY OF PRESENT ILLNESS:  Aileen Raygoza is a 89 year old female, who lives independently and highly functional presenting for a follow-up of permanent atrial  "fibrillation, hypertension, and hyperlipidemia.     She has a history of permanent atrial fibrillation with ventricular rates previously auto rate controlled. Over the last year, her ventricular rates have increased to  BPM, and she is not on any rate-controlling medications. The patient is currently on rivaroxaban for anticoagulation. Her blood pressure is 147/80, and she is on lisinopril 20 mg daily for hypertension. Her LDL is well controlled at 52 with atorvastatin 20 mg daily.     Last transthoracic echocardiogram in 2022 showed normal cardiac function with an LVF of 65%. The patient's heart rates have been noted to vary between 90 to 140 BPM.  But she remains asymptomatic. She is independent, lives on her own, and is self-caring. She walks three miles a day.    Social History: The patient is a never tobacco user and does not consume alcohol.     On exam, vitals as documented.  She is comfortable at rest.  No edema.  Heart rates are irregular at 100-110 bpm, no audible murmur, lungs are clear, normal JVP.    Established patient.   Total time today 40 minutes.    The longitudinal plan of care for the condition(s) below were addressed during this visit. Due to the added complexity in care, I will continue to support Aileen in the subsequent management of this condition(s) and with the ongoing continuity of care of this condition(s).  Atrial fibrillation with suboptimal rate control, uncontrolled hypertension.    This note was completed in part using dictation via the Dragon voice recognition software. Some word and grammatical errors may occur and must be interpreted in the appropriate clinical context. If there are any questions pertaining to this issue, please contact me for further clarification.     Orders Placed This Encounter   Procedures     Follow-Up with Cardiology [facility]         Vitals: BP (!) 147/79 (BP Location: Left arm, Patient Position: Sitting)   Pulse 93   Ht 1.575 m (5' 2\")   Wt 70.4 kg " (155 lb 4.8 oz)   LMP  (LMP Unknown)   SpO2 95%   BMI 28.40 kg/m        CURRENT MEDICATIONS:  Current Outpatient Medications   Medication Sig Dispense Refill     acetaminophen (TYLENOL) 500 MG tablet Take 2 tablets (1,000 mg) by mouth 3 times daily (Patient taking differently: Take 1,000 mg by mouth 3 times daily. PRN)       atorvastatin (LIPITOR) 20 MG tablet Take 1 tablet (20 mg) by mouth daily. 90 tablet 3     Calcium Carbonate-Vitamin D (CALCIUM + D PO) Take 1 tablet by mouth daily       lisinopril (ZESTRIL) 20 MG tablet Take 1 tablet (20 mg) by mouth daily. 90 tablet 3     metoprolol tartrate (LOPRESSOR) 25 MG tablet Take 1 tablet (25 mg) by mouth 2 times daily. 60 tablet 4     Multiple Vitamins-Minerals (PRESERVISION AREDS 2 PO) Take 1 capsule by mouth 2 times daily       rivaroxaban ANTICOAGULANT (XARELTO) 20 MG TABS tablet Take 1 tablet (20 mg) by mouth daily (with dinner). 60 tablet 0         ALLERGIES:  Allergies   Allergen Reactions     Simvastatin Muscle Pain (Myalgia)             Thank you for allowing me to participate in the care of your patient.      Sincerely,     Carly Wilson MD     [facility]  cc:   Carly Wilson MD  [address] [place] [id number]

## 2025-05-27 DIAGNOSIS — I10 BENIGN ESSENTIAL HYPERTENSION: ICD-10-CM

## 2025-05-27 DIAGNOSIS — Z79.01 ON CONTINUOUS ORAL ANTICOAGULATION: ICD-10-CM

## 2025-05-27 DIAGNOSIS — I48.21 PERMANENT ATRIAL FIBRILLATION (H): ICD-10-CM

## 2025-05-27 RX ORDER — RIVAROXABAN 20 MG/1
20 TABLET, FILM COATED ORAL
Qty: 60 TABLET | Refills: 5 | OUTPATIENT
Start: 2025-05-27

## 2025-05-27 NOTE — TELEPHONE ENCOUNTER
Pt stated she has about 4 days left of xarelto and needs a refill.     Routing to provider pool as PCP is [facility]OO today.

## 2025-05-28 NOTE — TELEPHONE ENCOUNTER
Pt called the clinic asking for a 90 day supply to [facility]. Last script was sent for 60 days.     Pt stated she is leaving town in a couple days. Routing HP to PCP.

## 2025-06-04 ENCOUNTER — TELEPHONE (OUTPATIENT)
Dept: INTERNAL MEDICINE | Facility: CLINIC | Age: OVER 89
End: 2025-06-04
Payer: MEDICARE

## 2025-06-04 NOTE — TELEPHONE ENCOUNTER
Pt called for help with refilling metoprolol 25mg BID, and Triage reviewed pt's current order from Dr. Wilson that has refills on file. Pt stated that [facility] contacted them to let them know that her prescription was cancelled, and Triage confirmed that no such request or update was made re. Pt's metoprolol. Triage encouraged pt to call pharmacy back for clarification, and pt agreed to do so. Triage also said that clinic would be here to help if she runs into any further trouble with her metoprolol order - pt verbalized understanding.    Robyn Jones RN

## 2025-06-24 ENCOUNTER — OFFICE VISIT (OUTPATIENT)
Dept: INTERNAL MEDICINE | Facility: CLINIC | Age: OVER 89
End: 2025-06-24
Payer: MEDICARE

## 2025-06-24 VITALS
BODY MASS INDEX: 28.2 KG/M2 | WEIGHT: 154.2 LBS | OXYGEN SATURATION: 97 % | HEART RATE: 79 BPM | RESPIRATION RATE: 18 BRPM | TEMPERATURE: 97.7 F | DIASTOLIC BLOOD PRESSURE: 62 MMHG | SYSTOLIC BLOOD PRESSURE: 140 MMHG

## 2025-06-24 DIAGNOSIS — E78.00 PURE HYPERCHOLESTEROLEMIA: ICD-10-CM

## 2025-06-24 DIAGNOSIS — M81.0 AGE-RELATED OSTEOPOROSIS WITHOUT CURRENT PATHOLOGICAL FRACTURE: ICD-10-CM

## 2025-06-24 DIAGNOSIS — Z92.29 PERSONAL HISTORY OF OTHER DRUG THERAPY: ICD-10-CM

## 2025-06-24 DIAGNOSIS — I10 BENIGN ESSENTIAL HYPERTENSION: Primary | ICD-10-CM

## 2025-06-24 DIAGNOSIS — I48.21 PERMANENT ATRIAL FIBRILLATION (H): ICD-10-CM

## 2025-06-24 DIAGNOSIS — R73.01 IMPAIRED FASTING GLUCOSE: ICD-10-CM

## 2025-06-24 PROCEDURE — 99213 OFFICE O/P EST LOW 20 MIN: CPT | Performed by: INTERNAL MEDICINE

## 2025-06-24 PROCEDURE — 3078F DIAST BP <80 MM HG: CPT | Performed by: INTERNAL MEDICINE

## 2025-06-24 PROCEDURE — G2211 COMPLEX E/M VISIT ADD ON: HCPCS | Performed by: INTERNAL MEDICINE

## 2025-06-24 PROCEDURE — 3077F SYST BP >= 140 MM HG: CPT | Performed by: INTERNAL MEDICINE

## 2025-06-24 RX ORDER — EPINEPHRINE 1 MG/ML
0.3 INJECTION, SOLUTION, CONCENTRATE INTRAVENOUS EVERY 5 MIN PRN
OUTPATIENT
Start: 2025-07-08

## 2025-06-24 RX ORDER — HEPARIN SODIUM,PORCINE 10 UNIT/ML
5-20 VIAL (ML) INTRAVENOUS DAILY PRN
OUTPATIENT
Start: 2025-07-08

## 2025-06-24 RX ORDER — ZOLEDRONIC ACID 0.05 MG/ML
5 INJECTION, SOLUTION INTRAVENOUS ONCE
Start: 2025-07-08

## 2025-06-24 RX ORDER — ALBUTEROL SULFATE 0.83 MG/ML
2.5 SOLUTION RESPIRATORY (INHALATION)
OUTPATIENT
Start: 2025-07-08

## 2025-06-24 RX ORDER — ALBUTEROL SULFATE 90 UG/1
1-2 INHALANT RESPIRATORY (INHALATION)
Start: 2025-07-08

## 2025-06-24 RX ORDER — METHYLPREDNISOLONE SODIUM SUCCINATE 40 MG/ML
40 INJECTION INTRAMUSCULAR; INTRAVENOUS
Start: 2025-07-08

## 2025-06-24 RX ORDER — ACETAMINOPHEN 325 MG/1
650 TABLET ORAL
OUTPATIENT
Start: 2025-07-08

## 2025-06-24 RX ORDER — DIPHENHYDRAMINE HYDROCHLORIDE 50 MG/ML
25 INJECTION, SOLUTION INTRAMUSCULAR; INTRAVENOUS
Start: 2025-07-08

## 2025-06-24 RX ORDER — MEPERIDINE HYDROCHLORIDE 25 MG/ML
25 INJECTION INTRAMUSCULAR; INTRAVENOUS; SUBCUTANEOUS
OUTPATIENT
Start: 2025-07-08

## 2025-06-24 RX ORDER — DIPHENHYDRAMINE HYDROCHLORIDE 50 MG/ML
50 INJECTION, SOLUTION INTRAMUSCULAR; INTRAVENOUS
Start: 2025-07-08

## 2025-06-24 RX ORDER — HEPARIN SODIUM (PORCINE) LOCK FLUSH IV SOLN 100 UNIT/ML 100 UNIT/ML
5 SOLUTION INTRAVENOUS
OUTPATIENT
Start: 2025-07-08

## 2025-06-24 NOTE — PROGRESS NOTES
Assessment & Plan     Benign essential hypertension  Controlled    Permanent atrial fibrillation (H)  Stable    Pure hypercholesterolemia  Stable    Impaired fasting glucose  Stable    Age-related osteoporosis without current pathological fracture  Personal history of other drug therapy  IV Reclast ordered              Follow-up   6 months for physical.    The longitudinal plan of care for the diagnosis(es)/condition(s) as documented were addressed during this visit. Due to the added complexity in care, I will continue to support Aileen in the subsequent management and with ongoing continuity of care.      Subjective   Aileen is a 89 year old, presenting for the following health issues:  Recheck Medication      6/24/2025     9:47 AM   Additional Questions   Roomed by Lakshmi RAMÍREZ CMA   Accompanied by self     History of Present Illness       Reason for visit:  Follow up for medication    She eats 2-3 servings of fruits and vegetables daily.She consumes 0 sweetened beverage(s) daily.She exercises with enough effort to increase her heart rate 60 or more minutes per day.  She exercises with enough effort to increase her heart rate 7 days per week.   She is taking medications regularly.        Patient is doing fine.    Has a question about DEXA scan.  Scan done 2 years ago showed osteoporosis, she is taking calcium and vitamin D.  Does not want to take oral medication but is willing to try IV medication.          Review of Systems  Constitutional, neuro, ENT, endocrine, pulmonary, cardiac, gastrointestinal, genitourinary, musculoskeletal, integument and psychiatric systems are negative, except as otherwise noted.      Objective    BP (!) 140/62   Pulse 79   Temp 97.7  F (36.5  C) (Temporal)   Resp 18   Wt 69.9 kg (154 lb 3.2 oz)   LMP  (LMP Unknown)   SpO2 97%   BMI 28.20 kg/m    Body mass index is 28.2 kg/m .  Physical Exam   GENERAL: alert and no distress  NECK: no adenopathy, no asymmetry, masses, or  scars  RESP: lungs clear to auscultation - no rales, rhonchi or wheezes  CV: regular rate and rhythm, normal S1 S2, no S3 or S4, no murmur, click or rub, no peripheral edema  ABDOMEN: soft, nontender, no hepatosplenomegaly, no masses and bowel sounds normal  MS: no gross musculoskeletal defects noted, no edema            Signed Electronically by: Emily Bennett MD

## (undated) DEVICE — DRSG ADAPTIC 3X8" 6113

## (undated) DEVICE — MANIFOLD NEPTUNE 4 PORT 700-20

## (undated) DEVICE — PREP DURAPREP 26ML APL 8630

## (undated) DEVICE — BONE CLEANING TIP INTERPULSE  0210-010-000

## (undated) DEVICE — SUCTION TIP YANKAUER W/O VENT K86

## (undated) DEVICE — BLADE SAW RECIP STRK 70X12.5X1.2MM 0277-096-281

## (undated) DEVICE — DRAPE SHEET REV FOLD 3/4 9349

## (undated) DEVICE — Device

## (undated) DEVICE — SOL WATER IRRIG 1000ML BOTTLE 2F7114

## (undated) DEVICE — CAST PADDING 6" UNSTERILE 9046

## (undated) DEVICE — SUCTION TIP YANKAUER STR K87

## (undated) DEVICE — PACK TOTAL KNEE SOP15TKFSD

## (undated) DEVICE — DECANTER BAG 2002S

## (undated) DEVICE — DRAPE IOBAN INCISE 23X17" 6650EZ

## (undated) DEVICE — DRSG STERI STRIP 1/2X4" R1547

## (undated) DEVICE — CAST PADDING 4" UNSTERILE 9044

## (undated) DEVICE — HOOD SURG T7PLUS PEEL AWAY FACE SHIELD STRL LF 0416-801-100

## (undated) DEVICE — SU ETHIBOND 1 CTX 30" X865H

## (undated) DEVICE — SYR 50ML LL W/O NDL 309653

## (undated) DEVICE — SOLUTION WOUND CLEANSING 3/4OZ 10% PVP EA-L3011FB-50

## (undated) DEVICE — CAST PADDING 6" STERILE 9046S

## (undated) DEVICE — BONE CEMENT MIXEVAC HI VAC W/CARTRIDGE 0306-563-000

## (undated) DEVICE — WRAP EZY KNEE 1213PP

## (undated) DEVICE — ESU GROUND PAD UNIVERSAL W/O CORD

## (undated) DEVICE — SU VICRYL 1 CTX CR 8X18" J765D

## (undated) DEVICE — GLOVE BIOGEL PI SZ 8.0 40880

## (undated) DEVICE — STPL SKIN 35W 6.9MM  PXW35

## (undated) DEVICE — SU PDO 1 STRATAFIX 36X36CM CTX TAPERPOINT SXPD2B405

## (undated) DEVICE — BLADE PRECISION 25X1.27X9 6725127090

## (undated) DEVICE — SUCTION IRR SYSTEM W/O TIP INTERPULSE HANDPIECE 0210-100-000

## (undated) DEVICE — SU ETHICON STRATAFIX SPR PS 3-0 30X30CM SXMP2B412

## (undated) DEVICE — BLADE SAW SAGITTAL STRK 18X90X1.27MM HD SYS 6 6118-127-090

## (undated) DEVICE — NDL SPINAL 18GA 3.5" 405184

## (undated) DEVICE — DECANTER VIAL 2006S

## (undated) DEVICE — SU VICRYL 2-0 CT-1 27" UND J259H

## (undated) DEVICE — DRAPE STERI U 1015

## (undated) DEVICE — SOL NACL 0.9% IRRIG 3000ML BAG 2B7477

## (undated) DEVICE — LINEN TOWEL PACK X5 5464

## (undated) DEVICE — ESU ELEC BLADE HEX-LOCKING 2.5" E1450X

## (undated) DEVICE — GLOVE BIOGEL PI MICRO INDICATOR UNDERGLOVE SZ 8.0 48980

## (undated) DEVICE — SPONGE LAP 18X18" X8435

## (undated) RX ORDER — ONDANSETRON 2 MG/ML
INJECTION INTRAMUSCULAR; INTRAVENOUS
Status: DISPENSED
Start: 2023-06-12

## (undated) RX ORDER — CELECOXIB 200 MG/1
CAPSULE ORAL
Status: DISPENSED
Start: 2023-06-12

## (undated) RX ORDER — ACETAMINOPHEN 325 MG/1
TABLET ORAL
Status: DISPENSED
Start: 2023-06-12

## (undated) RX ORDER — PROPOFOL 10 MG/ML
INJECTION, EMULSION INTRAVENOUS
Status: DISPENSED
Start: 2023-06-12

## (undated) RX ORDER — TRANEXAMIC ACID 10 MG/ML
INJECTION, SOLUTION INTRAVENOUS
Status: DISPENSED
Start: 2023-06-12

## (undated) RX ORDER — LABETALOL HYDROCHLORIDE 5 MG/ML
INJECTION, SOLUTION INTRAVENOUS
Status: DISPENSED
Start: 2023-06-12

## (undated) RX ORDER — VANCOMYCIN HYDROCHLORIDE 1 G/20ML
INJECTION, POWDER, LYOPHILIZED, FOR SOLUTION INTRAVENOUS
Status: DISPENSED
Start: 2023-06-12

## (undated) RX ORDER — FENTANYL CITRATE 50 UG/ML
INJECTION, SOLUTION INTRAMUSCULAR; INTRAVENOUS
Status: DISPENSED
Start: 2023-06-12

## (undated) RX ORDER — FENTANYL CITRATE 50 UG/ML
INJECTION, SOLUTION INTRAMUSCULAR; INTRAVENOUS
Status: DISPENSED
Start: 2020-02-05

## (undated) RX ORDER — HYDROMORPHONE HYDROCHLORIDE 1 MG/ML
INJECTION, SOLUTION INTRAMUSCULAR; INTRAVENOUS; SUBCUTANEOUS
Status: DISPENSED
Start: 2023-06-12

## (undated) RX ORDER — DEXAMETHASONE SODIUM PHOSPHATE 4 MG/ML
INJECTION, SOLUTION INTRA-ARTICULAR; INTRALESIONAL; INTRAMUSCULAR; INTRAVENOUS; SOFT TISSUE
Status: DISPENSED
Start: 2023-06-12